# Patient Record
Sex: FEMALE | Race: WHITE | NOT HISPANIC OR LATINO | Employment: UNEMPLOYED | URBAN - METROPOLITAN AREA
[De-identification: names, ages, dates, MRNs, and addresses within clinical notes are randomized per-mention and may not be internally consistent; named-entity substitution may affect disease eponyms.]

---

## 2017-01-16 RX ORDER — LISINOPRIL 40 MG/1
TABLET ORAL
Qty: 90 TABLET | Refills: 1 | Status: SHIPPED | OUTPATIENT
Start: 2017-01-16 | End: 2017-07-17 | Stop reason: SDUPTHER

## 2017-01-24 RX ORDER — AMLODIPINE BESYLATE 5 MG/1
TABLET ORAL
Qty: 90 TABLET | Refills: 1 | Status: SHIPPED | OUTPATIENT
Start: 2017-01-24 | End: 2017-07-17 | Stop reason: SDUPTHER

## 2017-01-24 RX ORDER — HYDROCHLOROTHIAZIDE 25 MG/1
TABLET ORAL
Qty: 90 TABLET | Refills: 1 | Status: SHIPPED | OUTPATIENT
Start: 2017-01-24 | End: 2017-07-17 | Stop reason: SDUPTHER

## 2017-01-24 RX ORDER — ATENOLOL 100 MG/1
TABLET ORAL
Qty: 90 TABLET | Refills: 1 | Status: SHIPPED | OUTPATIENT
Start: 2017-01-24 | End: 2017-07-17 | Stop reason: SDUPTHER

## 2017-01-25 ENCOUNTER — OFFICE VISIT (OUTPATIENT)
Dept: INTERNAL MEDICINE | Facility: CLINIC | Age: 62
End: 2017-01-25

## 2017-01-25 VITALS
SYSTOLIC BLOOD PRESSURE: 132 MMHG | WEIGHT: 170 LBS | DIASTOLIC BLOOD PRESSURE: 72 MMHG | HEIGHT: 61 IN | TEMPERATURE: 98 F | HEART RATE: 72 BPM | BODY MASS INDEX: 32.1 KG/M2 | RESPIRATION RATE: 16 BRPM

## 2017-01-25 DIAGNOSIS — Z11.59 NEED FOR HEPATITIS C SCREENING TEST: Primary | ICD-10-CM

## 2017-01-25 DIAGNOSIS — Z23 NEED FOR PNEUMOCOCCAL VACCINE: ICD-10-CM

## 2017-01-25 DIAGNOSIS — Z00.00 WELLNESS EXAMINATION: ICD-10-CM

## 2017-01-25 DIAGNOSIS — I10 ESSENTIAL HYPERTENSION: ICD-10-CM

## 2017-01-25 DIAGNOSIS — Z13.820 SCREENING FOR OSTEOPOROSIS: ICD-10-CM

## 2017-01-25 PROCEDURE — G0009 ADMIN PNEUMOCOCCAL VACCINE: HCPCS | Performed by: NURSE PRACTITIONER

## 2017-01-25 PROCEDURE — G0438 PPPS, INITIAL VISIT: HCPCS | Performed by: NURSE PRACTITIONER

## 2017-01-25 PROCEDURE — 96160 PT-FOCUSED HLTH RISK ASSMT: CPT | Performed by: NURSE PRACTITIONER

## 2017-01-25 PROCEDURE — 90732 PPSV23 VACC 2 YRS+ SUBQ/IM: CPT | Performed by: NURSE PRACTITIONER

## 2017-01-25 RX ORDER — ATENOLOL 100 MG/1
TABLET ORAL
Qty: 90 TABLET | Refills: 1 | Status: CANCELLED | OUTPATIENT
Start: 2017-01-25

## 2017-01-25 RX ORDER — AMLODIPINE BESYLATE 5 MG/1
TABLET ORAL
Qty: 90 TABLET | Refills: 1 | Status: CANCELLED | OUTPATIENT
Start: 2017-01-25

## 2017-01-25 RX ORDER — HYDROCHLOROTHIAZIDE 25 MG/1
TABLET ORAL
Qty: 90 TABLET | Refills: 1 | Status: CANCELLED | OUTPATIENT
Start: 2017-01-25

## 2017-01-25 NOTE — PROGRESS NOTES
QUICK REFERENCE INFORMATION:  The ABCs of the Annual Wellness Visit    Initial Medicare Wellness Visit    HEALTH RISK ASSESSMENT    Recent Hospitalizations:  No recent hospitalization(s)..        Current Medical Providers:  Patient Care Team:  Janes Almazan MD as PCP - Internal Medicine  Elin Arredondo MD as Surgeon (General Surgery)  Karina Brown MD (Radiation Oncology)  Steven Akbar MD as Consulting Physician (Ophthalmology)  JEREMIE Carr as Nurse Practitioner (Nurse Practitioner)        Smoking Status:  History   Smoking Status   • Never Smoker   Smokeless Tobacco   • Never Used       Alcohol Consumption:  History   Alcohol Use No       Depression Screen:   PHQ-9 Depression Screening 1/25/2017   Little interest or pleasure in doing things 1   Feeling down, depressed, or hopeless 1   Trouble falling or staying asleep, or sleeping too much 1   Feeling tired or having little energy 2   Poor appetite or overeating 0   Feeling bad about yourself - or that you are a failure or have let yourself or your family down 0   Trouble concentrating on things, such as reading the newspaper or watching television 1   Moving or speaking so slowly that other people could have noticed. Or the opposite - being so fidgety or restless that you have been moving around a lot more than usual 0   Thoughts that you would be better off dead, or of hurting yourself in some way 0   PHQ-9 Total Score 6   If you checked off any problems, how difficult have these problems made it for you to do your work, take care of things at home, or get along with other people? Very difficult       Health Habits and Functional and Cognitive Screening:  Functional & Cognitive Status 1/25/2017   Do you have difficulty preparing food and eating? Yes   Do you have difficulty bathing yourself? No   Do you have difficulty getting dressed? No   Do you have difficulty using the toilet? No   Do you have difficulty moving around from place to place?  Yes   In the past year have you fallen or experienced a near fall? Yes   Do you need help using the phone?  No   Are you deaf or do you have serious difficulty hearing?  No   Do you need help with transportation? Yes   Do you need help shopping? Yes   Do you need help preparing meals?  Yes   Do you need help with housework?  Yes   Do you need help with laundry? No   Do you need help taking your medications? No   Do you need help managing money? No   Do you have difficulty concentrating, remembering or making decisions? Yes   Declines current need for counseling and does not need any additional med for depression.  Mostly situational due to becoming legally blind and having to stop driving and work.     Health Habits  Current Diet: Limited Junk Food  Exercise: 3 (times per week )  Current Exercise Activities Include: Walking    Does the patient have evidence of cognitive impairment? No    Asprin use counseling:yes    Finger Rub Hearing Test (right ear):passed  Finger Rub Hearing Test (left ear):passed    Recent Lab Results:    Visual Acuity:  No exam data present    Age-appropriate Screening Schedule:  Refer to the list below for future screening recommendations based on patient's age, sex and/or medical conditions. Orders for these recommended tests are listed in the plan section. The patient has been provided with a written plan.    Health Maintenance   Topic Date Due   • PNEUMOCOCCAL VACCINE (19-64 MEDIUM RISK) (1 of 1 - PPSV23) 07/17/1973   • TDAP/TD VACCINES (1 - Tdap) 07/17/1973   • ZOSTER VACCINE  06/09/2016   • URINE MICROALBUMIN  06/09/2017   • DIABETIC FOOT EXAM  10/01/2017   • DIABETIC EYE EXAM  10/01/2017   • LIPID PANEL  12/05/2017   • HEMOGLOBIN A1C  12/05/2017   • MAMMOGRAM  03/01/2018   • PAP SMEAR  04/01/2019   • COLONOSCOPY  10/07/2023   • INFLUENZA VACCINE  Completed        Subjective   History of Present Illness    Zeinab Rodriguez is a 61 y.o. female who presents for an Annual Wellness Visit. In  addition, we addressed the following health issues:falls risk due to eyesight    The following portions of the patient's history were reviewed and updated as appropriate: allergies, current medications, past family history, past medical history, past social history, past surgical history and problem list.    Outpatient Medications Prior to Visit   Medication Sig Dispense Refill   • amLODIPine (NORVASC) 5 MG tablet TAKE (1) TABLET DAILY FOR HEART. 90 tablet 1   • aspirin 81 MG tablet Take  by mouth daily.     • atenolol (TENORMIN) 100 MG tablet TAKE (1) TABLET DAILY FOR HEART. 90 tablet 1   • CILOXAN 0.3 % ophthalmic ointment      • dorzolamide-timolol (COSOPT) 22.3-6.8 MG/ML ophthalmic solution Apply  to eye 2 (two) times a day.     • glipiZIDE (GLUCOTROL) 10 MG tablet Take 1 tablet by mouth 2 (Two) Times a Day. 180 tablet 0   • glucose blood (ONE TOUCH ULTRA TEST) test strip TEST ONCE DAILY AND AS NEEDED 100 each 3   • hydrochlorothiazide (HYDRODIURIL) 25 MG tablet TAKE (1) TABLET DAILY FOR FLUID. 90 tablet 1   • JANUVIA 100 MG tablet TAKE 1 TABLET ONCE DAILY FOR DIABETES 90 tablet 1   • Lancets misc USE TO TEST ONCE DAILY AND AS NEEDED 100 each 3   • latanoprost (XALATAN) 0.005 % ophthalmic solution      • lisinopril (PRINIVIL,ZESTRIL) 40 MG tablet TAKE 1 TABLET DAILY 90 tablet 1   • metFORMIN (GLUCOPHAGE) 1000 MG tablet TAKE 1 TABLET TWICE A  tablet 1   • Multiple Vitamins-Minerals (ICAPS AREDS FORMULA PO) Take  by mouth 2 (two) times a day.     • PARoxetine (PAXIL) 20 MG tablet Take 1 tablet by mouth Every Morning. 90 tablet 0   • melatonin 1 MG tablet Take 3 mg by mouth every night.       No facility-administered medications prior to visit.        Patient Active Problem List   Diagnosis   • Abnormal mammogram   • Anemia   • Depression   • Diabetes mellitus   • Hyperlipidemia   • Hypertension   • Urinary tract infection   • Anxiety       Advanced Care Planning:  has NO advanced directive - information  "provided to the patient today    Identification of Risk Factors:  Risk factors include: increased fall risk.    Review of Systems    Compared to one year ago, the patient feels her physical health is better.  Compared to one year ago, the patient feels her mental health is better.    Objective     Physical Exam    Vitals:    01/25/17 1053   BP: 132/72   BP Location: Right arm   Patient Position: Sitting   Cuff Size: Adult   Pulse: 72   Resp: 16   Temp: 98 °F (36.7 °C)   TempSrc: Temporal Artery    Weight: 170 lb (77.1 kg)   Height: 61\" (154.9 cm)       Body mass index is 32.12 kg/(m^2).  Discussed the patient's BMI with her. The BMI is above average; BMI management plan is completed.    Assessment/Plan   Patient Self-Management and Personalized Health Advice  The patient has been provided with information about: exercise and weight management and preventive services including:   · Bone densitometry screening, Fall Risk assessment done, hep c .    Visit Diagnoses:    ICD-10-CM ICD-9-CM   1. Need for hepatitis C screening test Z11.59 V73.89   2. Screening for osteoporosis Z13.820 V82.81   3. Need for pneumococcal vaccine Z23 V03.82   4. Wellness examination Z00.00 V70.0       Orders Placed This Encounter   Procedures   • Dexa Bone Density, Axial (Every 2 Years)     Standing Status:   Future     Standing Expiration Date:   1/25/2018     Order Specific Question:   Reason for Exam:     Answer:   post menopausal   • Pneumococcal Polysaccharide Vaccine 23-Valent Greater Than or Equal To 3yo Subcutaneous / IM   • Hepatitis C Antibody       Outpatient Encounter Prescriptions as of 1/25/2017   Medication Sig Dispense Refill   • amLODIPine (NORVASC) 5 MG tablet TAKE (1) TABLET DAILY FOR HEART. 90 tablet 1   • aspirin 81 MG tablet Take  by mouth daily.     • atenolol (TENORMIN) 100 MG tablet TAKE (1) TABLET DAILY FOR HEART. 90 tablet 1   • CILOXAN 0.3 % ophthalmic ointment      • dorzolamide-timolol (COSOPT) 22.3-6.8 MG/ML " ophthalmic solution Apply  to eye 2 (two) times a day.     • glipiZIDE (GLUCOTROL) 10 MG tablet Take 1 tablet by mouth 2 (Two) Times a Day. 180 tablet 0   • glucose blood (ONE TOUCH ULTRA TEST) test strip TEST ONCE DAILY AND AS NEEDED 100 each 3   • hydrochlorothiazide (HYDRODIURIL) 25 MG tablet TAKE (1) TABLET DAILY FOR FLUID. 90 tablet 1   • JANUVIA 100 MG tablet TAKE 1 TABLET ONCE DAILY FOR DIABETES 90 tablet 1   • Lancets misc USE TO TEST ONCE DAILY AND AS NEEDED 100 each 3   • latanoprost (XALATAN) 0.005 % ophthalmic solution      • lisinopril (PRINIVIL,ZESTRIL) 40 MG tablet TAKE 1 TABLET DAILY 90 tablet 1   • metFORMIN (GLUCOPHAGE) 1000 MG tablet TAKE 1 TABLET TWICE A  tablet 1   • Multiple Vitamins-Minerals (ICAPS AREDS FORMULA PO) Take  by mouth 2 (two) times a day.     • PARoxetine (PAXIL) 20 MG tablet Take 1 tablet by mouth Every Morning. 90 tablet 0   • melatonin 1 MG tablet Take 3 mg by mouth every night.       No facility-administered encounter medications on file as of 1/25/2017.        Reviewed use of high risk medication in the elderly: yes  Reviewed for potential of harmful drug interactions in the elderly: yes    Follow Up:  One year recall for wellness and PE fasting at next f/u with pcp Dr Lisette Gross After Visit Summary and PPPS with all of these plans were given to the patient.

## 2017-01-25 NOTE — PATIENT INSTRUCTIONS
Fall Prevention in the Home   Falls can cause injuries and can affect people from all age groups. There are many simple things that you can do to make your home safe and to help prevent falls.  WHAT CAN I DO ON THE OUTSIDE OF MY HOME?  · Regularly repair the edges of walkways and driveways and fix any cracks.  · Remove high doorway thresholds.  · Trim any shrubbery on the main path into your home.  · Use bright outdoor lighting.  · Clear walkways of debris and clutter, including tools and rocks.  · Regularly check that handrails are securely fastened and in good repair. Both sides of any steps should have handrails.  · Install guardrails along the edges of any raised decks or porches.  · Have leaves, snow, and ice cleared regularly.  · Use sand or salt on walkways during winter months.  · In the garage, clean up any spills right away, including grease or oil spills.  WHAT CAN I DO IN THE BATHROOM?  · Use night lights.  · Install grab bars by the toilet and in the tub and shower. Do not use towel bars as grab bars.  · Use non-skid mats or decals on the floor of the tub or shower.  · If you need to sit down while you are in the shower, use a plastic, non-slip stool..  · Keep the floor dry. Immediately clean up any water that spills on the floor.  · Remove soap buildup in the tub or shower on a regular basis.  · Attach bath mats securely with double-sided non-slip rug tape.  · Remove throw rugs and other tripping hazards from the floor.  WHAT CAN I DO IN THE BEDROOM?  · Use night lights.  · Make sure that a bedside light is easy to reach.  · Do not use oversized bedding that drapes onto the floor.  · Have a firm chair that has side arms to use for getting dressed.  · Remove throw rugs and other tripping hazards from the floor.  WHAT CAN I DO IN THE KITCHEN?   · Clean up any spills right away.  · Avoid walking on wet floors.  · Place frequently used items in easy-to-reach places.  · If you need to reach for something  above you, use a sturdy step stool that has a grab bar.  · Keep electrical cables out of the way.  · Do not use floor polish or wax that makes floors slippery. If you have to use wax, make sure that it is non-skid floor wax.  · Remove throw rugs and other tripping hazards from the floor.  WHAT CAN I DO IN THE STAIRWAYS?  · Do not leave any items on the stairs.  · Make sure that there are handrails on both sides of the stairs. Fix handrails that are broken or loose. Make sure that handrails are as long as the stairways.  · Check any carpeting to make sure that it is firmly attached to the stairs. Fix any carpet that is loose or worn.  · Avoid having throw rugs at the top or bottom of stairways, or secure the rugs with carpet tape to prevent them from moving.  · Make sure that you have a light switch at the top of the stairs and the bottom of the stairs. If you do not have them, have them installed.  WHAT ARE SOME OTHER FALL PREVENTION TIPS?  · Wear closed-toe shoes that fit well and support your feet. Wear shoes that have rubber soles or low heels.  · When you use a stepladder, make sure that it is completely opened and that the sides are firmly locked. Have someone hold the ladder while you are using it. Do not climb a closed stepladder.  · Add color or contrast paint or tape to grab bars and handrails in your home. Place contrasting color strips on the first and last steps.  · Use mobility aids as needed, such as canes, walkers, scooters, and crutches.  · Turn on lights if it is dark. Replace any light bulbs that burn out.  · Set up furniture so that there are clear paths. Keep the furniture in the same spot.  · Fix any uneven floor surfaces.  · Choose a carpet design that does not hide the edge of steps of a stairway.  · Be aware of any and all pets.  · Review your medicines with your healthcare provider. Some medicines can cause dizziness or changes in blood pressure, which increase your risk of falling.  Talk  "with your health care provider about other ways that you can decrease your risk of falls. This may include working with a physical therapist or  to improve your strength, balance, and endurance.     This information is not intended to replace advice given to you by your health care provider. Make sure you discuss any questions you have with your health care provider.     Document Released: 12/08/2003 DocumentBasic Carbohydrate Counting for Diabetes Mellitus  Carbohydrate counting is a method for keeping track of the amount of carbohydrates you eat. Eating carbohydrates naturally increases the level of sugar (glucose) in your blood, so it is important for you to know the amount that is okay for you to have in every meal. Carbohydrate counting helps keep the level of glucose in your blood within normal limits. The amount of carbohydrates allowed is different for every person. A dietitian can help you calculate the amount that is right for you. Once you know the amount of carbohydrates you can have, you can count the carbohydrates in the foods you want to eat.  Carbohydrates are found in the following foods:  · Grains, such as breads and cereals.  · Dried beans and soy products.  · Starchy vegetables, such as potatoes, peas, and corn.  · Fruit and fruit juices.  · Milk and yogurt.  · Sweets and snack foods, such as cake, cookies, candy, chips, soft drinks, and fruit drinks.  CARBOHYDRATE COUNTING  There are two ways to count the carbohydrates in your food. You can use either of the methods or a combination of both.  Reading the \"Nutrition Facts\" on Packaged Food  The \"Nutrition Facts\" is an area that is included on the labels of almost all packaged food and beverages in the [place]. It includes the serving size of that food or beverage and information about the nutrients in each serving of the food, including the grams (g) of carbohydrate per serving.   Decide the number of servings of this food or " "beverage that you will be able to eat or drink. Multiply that number of servings by the number of grams of carbohydrate that is listed on the label for that serving. The total will be the amount of carbohydrates you will be having when you eat or drink this food or beverage.  Learning Standard Serving Sizes of Food  When you eat food that is not packaged or does not include \"Nutrition Facts\" on the label, you need to measure the servings in order to count the amount of carbohydrates. A serving of most carbohydrate-rich foods contains about 15 g of carbohydrates. The following list includes serving sizes of carbohydrate-rich foods that provide 15 g of carbohydrate per serving:    · 1 slice of bread (1 oz) or 1 six-inch tortilla.    · ½ of a hamburger bun or English muffin.  · 4-6 crackers.  · ¾ cup unsweetened dry cereal.    · ½ cup hot cereal.  · ? cup rice or pasta.    · ½ cup mashed potatoes or ¼ of a large baked potato.  · 1 cup fresh fruit or one small piece of fruit.    · ½ cup canned or frozen fruit or fruit juice.  · 1 cup milk.  · ? cup plain fat-free yogurt or yogurt sweetened with artificial sweeteners.  · ½ cup cooked dried beans or starchy vegetable, such as peas, corn, or potatoes.    Decide the number of standard-size servings that you will eat. Multiply that number of servings by 15 (the grams of carbohydrates in that serving). For example, if you eat 2 cups of strawberries, you will have eaten 2 servings and 30 g of carbohydrates (2 servings x 15 g = 30 g). For foods such as soups and casseroles, in which more than one food is mixed in, you will need to count the carbohydrates in each food that is included.  EXAMPLE OF CARBOHYDRATE COUNTING  Sample Dinner   · 3 oz chicken breast.  · ? cup of brown rice.  · ½ cup of corn.  · 1 cup milk.    · 1 cup strawberries with sugar-free whipped topping.    Carbohydrate Calculation   Step 1: Identify the foods that contain carbohydrates:   · Rice.    · Corn. "    · Milk.    · Strawberries.  Step 2: Calculate the number of servings eaten of each:   · 2 servings of rice.    · 1 serving of corn.    · 1 serving of milk.    · 1 serving of strawberries.  Step 3:  Multiply each of those number of servings by 15 g:   · 2 servings of rice x 15 g = 30 g.    · 1 serving of corn x 15 g = 15 g.    · 1 serving of milk x 15 g = 15 g.    · 1 serving of strawberries x 15 g = 15 g.  Step 4: Add together all of the amounts to find the total grams of carbohydrates eaten: 30 g + 15 g + 15 g + 15 g = 75 g.     This information is not intended to replace advice given to you by your health care provider. Make sure you discuss any questions you have with your health care provider.     Document Released: 12/18/2006 Document Revised: 01/08/2016 Document Reviewed: 11/14/2014  [employer] Interactive Patient Education ©2016 [employer]  Fat and Cholesterol Restricted Diet  High levels of fat and cholesterol in your blood may lead to various health problems, such as diseases of the heart, blood vessels, gallbladder, liver, and pancreas. Fats are concentrated sources of energy that come in various forms. Certain types of fat, including saturated fat, may be harmful in excess. Cholesterol is a substance needed by your body in small amounts. Your body makes all the cholesterol it needs. Excess cholesterol comes from the food you eat.  When you have high levels of cholesterol and saturated fat in your blood, health problems can develop because the excess fat and cholesterol will gather along the walls of your blood vessels, causing them to narrow. Choosing the right foods will help you control your intake of fat and cholesterol. This will help keep the levels of these substances in your blood within normal limits and reduce your risk of disease.  WHAT IS MY PLAN?  Your health care provider recommends that you:  · Get no more than __________ % of the total calories in your daily diet from fat.  · Limit your  "intake of saturated fat to less than ______% of your total calories each day.  · Limit the amount of cholesterol in your diet to less than _________mg per day.  WHAT TYPES OF FAT SHOULD I CHOOSE?  · Choose healthy fats more often. Choose monounsaturated and polyunsaturated fats, such as olive and canola oil, flaxseeds, walnuts, almonds, and seeds.  · Eat more omega-3 fats. Good choices include salmon, mackerel, sardines, tuna, flaxseed oil, and ground flaxseeds. Aim to eat fish at least two times a week.  · Limit saturated fats. Saturated fats are primarily found in animal products, such as meats, butter, and cream. Plant sources of saturated fats include palm oil, palm kernel oil, and coconut oil.  · Avoid foods with partially hydrogenated oils in them. These contain trans fats. Examples of foods that contain trans fats are stick margarine, some tub margarines, cookies, crackers, and other baked goods.  WHAT GENERAL GUIDELINES DO I NEED TO FOLLOW?  These guidelines for healthy eating will help you control your intake of fat and cholesterol:  · Check food labels carefully to identify foods with trans fats or high amounts of saturated fat.  · Fill one half of your plate with vegetables and green salads.  · Fill one fourth of your plate with whole grains. Look for the word \"whole\" as the first word in the ingredient list.  · Fill one fourth of your plate with lean protein foods.  · Limit fruit to two servings a day. Choose fruit instead of juice.  · Eat more foods that contain soluble fiber. Examples of foods that contain this type of fiber are apples, broccoli, carrots, beans, peas, and barley. Aim to get 20-30 g of fiber per day.  · Eat more home-cooked food and less restaurant, buffet, and fast food.  · Limit or avoid alcohol.  · Limit foods high in starch and sugar.  · Limit fried foods.  · Cook foods using methods other than frying. Baking, boiling, grilling, and broiling are all great options.  · Lose weight if " you are overweight. Losing just 5-10% of your initial body weight can help your overall health and prevent diseases such as diabetes and heart disease.  WHAT FOODS CAN I EAT?  Grains  Whole grains, such as whole wheat or whole grain breads, crackers, cereals, and pasta. Unsweetened oatmeal, bulgur, barley, quinoa, or brown rice. Corn or whole wheat flour tortillas.  Vegetables  Fresh or frozen vegetables (raw, steamed, roasted, or grilled). Green salads.  Fruits  All fresh, canned (in natural juice), or frozen fruits.  Meat and Other Protein Products  Ground beef (85% or leaner), grass-fed beef, or beef trimmed of fat. Skinless chicken or turkey. Ground chicken or turkey. Pork trimmed of fat. All fish and seafood. Eggs. Dried beans, peas, or lentils. Unsalted nuts or seeds. Unsalted canned or dry beans.  Dairy  Low-fat dairy products, such as skim or 1% milk, 2% or reduced-fat cheeses, low-fat ricotta or cottage cheese, or plain low-fat yogurt.  Fats and Oils  Tub margarines without trans fats. Light or reduced-fat mayonnaise and salad dressings. Avocado. Olive, canola, sesame, or safflower oils. Natural peanut or almond butter (choose ones without added sugar and oil).  The items listed above may not be a complete list of recommended foods or beverages. Contact your dietitian for more options.  WHAT FOODS ARE NOT RECOMMENDED?  Grains  White bread. White pasta. White rice. Cornbread. Bagels, pastries, and croissants. Crackers that contain trans fat.  Vegetables  White potatoes. Corn. Creamed or fried vegetables. Vegetables in a cheese sauce.  Fruits  Dried fruits. Canned fruit in light or heavy syrup. Fruit juice.  Meat and Other Protein Products  Fatty cuts of meat. Ribs, chicken wings, weaver, sausage, bologna, salami, chitterlings, fatback, hot dogs, bratwurst, and packaged luncheon meats. Liver and organ meats.  Dairy  Whole or 2% milk, cream, half-and-half, and cream cheese. Whole milk cheeses. Whole-fat or  sweetened yogurt. Full-fat cheeses. Nondairy creamers and whipped toppings. Processed cheese, cheese spreads, or cheese curds.  Sweets and Desserts  Corn syrup, sugars, honey, and molasses. Candy. Jam and jelly. Syrup. Sweetened cereals. Cookies, pies, cakes, donuts, muffins, and ice cream.  Fats and Oils  Butter, stick margarine, lard, shortening, ghee, or weaver fat. Coconut, palm kernel, or palm oils.  Beverages  Alcohol. Sweetened drinks (such as sodas, lemonade, and fruit drinks or punches).  The items listed above may not be a complete list of foods and beverages to avoid. Contact your dietitian for more information.     This information is not intended to replace advice given to you by your health care provider. Make sure you discuss any questions you have with your health care provider.     Document Released: 12/18/2006 Document Revised: 01/08/2016 Document Reviewed: 03/18/2015  [employer] Interactive Patient Education ©2016 [employer]   Revised: 05/03/2016 Document Reviewed: 01/22/2016  [employer] Interactive Patient Education ©2016 [employer]

## 2017-01-25 NOTE — LETTER
"VACCINE CONSENT FORM      Patient Name:  Zeinab Rodriguez    Patient :  1955      I/We have read, or have been explained, the information about the diseases and the vaccines listed below.  There was an opportunity to ask questions and any questions were answered satisfactorily.  I/We believe that I/we understand the benefits and risks of the vaccines(s) cited, and ask the vaccine(s) listed below be given to me/us or the person named above (for which i have authorized to make the request).      Vaccine(s) give:    Orders Placed This Encounter   Procedures   • Pneumococcal Polysaccharide Vaccine 23-Valent Greater Than or Equal To 1yo Subcutaneous / IM         Medicare patients:    The only vaccine covered under your medical benefit is flu/pneumonia and hepatitis B.  All other may be covered under your \"Part D\" prescription plan and requires you to go to a pharmacy with a Physician orders for administration.  If you still prefer to have it administered at our office, you will receive a bill for the vaccine and administration cost.               Patient Initials                     Patient or Parent/Guardian Signature                    Date        A copy of the appropriate Centers for Disease Control and Prevention Vaccine Information Statements has been provided.   "

## 2017-01-25 NOTE — MR AVS SNAPSHOT
Zeinab Wages   1/25/2017 11:00 AM   Office Visit    Provider:  JEREMIE Denson   Department:  [facility]   Dept Phone:  [phone]                Your Full Care Plan              Your Updated Medication List          This list is accurate as of: 1/25/17 12:26 PM.  Always use your most recent med list.                amLODIPine 5 MG tablet   Commonly known as:  NORVASC   TAKE (1) TABLET DAILY FOR HEART.       aspirin 81 MG tablet       atenolol 100 MG tablet   Commonly known as:  TENORMIN   TAKE (1) TABLET DAILY FOR HEART.       CILOXAN 0.3 % ophthalmic ointment   Generic drug:  ciprofloxacin       COSOPT 22.3-6.8 MG/ML ophthalmic solution   Generic drug:  dorzolamide-timolol       glipiZIDE 10 MG tablet   Commonly known as:  GLUCOTROL   Take 1 tablet by mouth 2 (Two) Times a Day.       glucose blood test strip   Commonly known as:  ONE TOUCH ULTRA TEST   TEST ONCE DAILY AND AS NEEDED       hydrochlorothiazide 25 MG tablet   Commonly known as:  HYDRODIURIL   TAKE (1) TABLET DAILY FOR FLUID.       ICAPS AREDS FORMULA PO       JANUVIA 100 MG tablet   Generic drug:  SITagliptin   TAKE 1 TABLET ONCE DAILY FOR DIABETES       Lancets misc   USE TO TEST ONCE DAILY AND AS NEEDED       latanoprost 0.005 % ophthalmic solution   Commonly known as:  XALATAN       lisinopril 40 MG tablet   Commonly known as:  PRINIVIL,ZESTRIL   TAKE 1 TABLET DAILY       melatonin 1 MG tablet       metFORMIN 1000 MG tablet   Commonly known as:  GLUCOPHAGE   TAKE 1 TABLET TWICE A DAY       PARoxetine 20 MG tablet   Commonly known as:  PAXIL   Take 1 tablet by mouth Every Morning.               We Performed the Following     Hepatitis C Antibody     Pneumococcal Polysaccharide Vaccine 23-Valent Greater Than or Equal To 3yo Subcutaneous / IM       You Were Diagnosed With        Codes Comments    Need for hepatitis C screening test    -  Primary ICD-10-CM:  Z11.59  ICD-9-CM: V73.89     Screening for osteoporosis     ICD-10-CM: Z13.820  ICD-9-CM: V82.81     Need for pneumococcal vaccine     ICD-10-CM: Z23  ICD-9-CM: V03.82     Wellness examination     ICD-10-CM: Z00.00  ICD-9-CM: V70.0     Essential hypertension     ICD-10-CM: I10  ICD-9-CM: 401.9       Instructions    Fall Prevention in the Home   Falls can cause injuries and can affect people from all age groups. There are many simple things that you can do to make your home safe and to help prevent falls.  WHAT CAN I DO ON THE OUTSIDE OF MY HOME?  · Regularly repair the edges of walkways and driveways and fix any cracks.  · Remove high doorway thresholds.  · Trim any shrubbery on the main path into your home.  · Use bright outdoor lighting.  · Clear walkways of debris and clutter, including tools and rocks.  · Regularly check that handrails are securely fastened and in good repair. Both sides of any steps should have handrails.  · Install guardrails along the edges of any raised decks or porches.  · Have leaves, snow, and ice cleared regularly.  · Use sand or salt on walkways during winter months.  · In the garage, clean up any spills right away, including grease or oil spills.  WHAT CAN I DO IN THE BATHROOM?  · Use night lights.  · Install grab bars by the toilet and in the tub and shower. Do not use towel bars as grab bars.  · Use non-skid mats or decals on the floor of the tub or shower.  · If you need to sit down while you are in the shower, use a plastic, non-slip stool..  · Keep the floor dry. Immediately clean up any water that spills on the floor.  · Remove soap buildup in the tub or shower on a regular basis.  · Attach bath mats securely with double-sided non-slip rug tape.  · Remove throw rugs and other tripping hazards from the floor.  WHAT CAN I DO IN THE BEDROOM?  · Use night lights.  · Make sure that a bedside light is easy to reach.  · Do not use oversized bedding that drapes onto the floor.  · Have a  firm chair that has side arms to use for getting dressed.  · Remove throw rugs and other tripping hazards from the floor.  WHAT CAN I DO IN THE KITCHEN?   · Clean up any spills right away.  · Avoid walking on wet floors.  · Place frequently used items in easy-to-reach places.  · If you need to reach for something above you, use a sturdy step stool that has a grab bar.  · Keep electrical cables out of the way.  · Do not use floor polish or wax that makes floors slippery. If you have to use wax, make sure that it is non-skid floor wax.  · Remove throw rugs and other tripping hazards from the floor.  WHAT CAN I DO IN THE STAIRWAYS?  · Do not leave any items on the stairs.  · Make sure that there are handrails on both sides of the stairs. Fix handrails that are broken or loose. Make sure that handrails are as long as the stairways.  · Check any carpeting to make sure that it is firmly attached to the stairs. Fix any carpet that is loose or worn.  · Avoid having throw rugs at the top or bottom of stairways, or secure the rugs with carpet tape to prevent them from moving.  · Make sure that you have a light switch at the top of the stairs and the bottom of the stairs. If you do not have them, have them installed.  WHAT ARE SOME OTHER FALL PREVENTION TIPS?  · Wear closed-toe shoes that fit well and support your feet. Wear shoes that have rubber soles or low heels.  · When you use a stepladder, make sure that it is completely opened and that the sides are firmly locked. Have someone hold the ladder while you are using it. Do not climb a closed stepladder.  · Add color or contrast paint or tape to grab bars and handrails in your home. Place contrasting color strips on the first and last steps.  · Use mobility aids as needed, such as canes, walkers, scooters, and crutches.  · Turn on lights if it is dark. Replace any light bulbs that burn out.  · Set up furniture so that there are clear paths. Keep the furniture in the same  "spot.  · Fix any uneven floor surfaces.  · Choose a carpet design that does not hide the edge of steps of a stairway.  · Be aware of any and all pets.  · Review your medicines with your healthcare provider. Some medicines can cause dizziness or changes in blood pressure, which increase your risk of falling.  Talk with your health care provider about other ways that you can decrease your risk of falls. This may include working with a physical therapist or  to improve your strength, balance, and endurance.     This information is not intended to replace advice given to you by your health care provider. Make sure you discuss any questions you have with your health care provider.     Document Released: 12/08/2003 DocumentBasic Carbohydrate Counting for Diabetes Mellitus  Carbohydrate counting is a method for keeping track of the amount of carbohydrates you eat. Eating carbohydrates naturally increases the level of sugar (glucose) in your blood, so it is important for you to know the amount that is okay for you to have in every meal. Carbohydrate counting helps keep the level of glucose in your blood within normal limits. The amount of carbohydrates allowed is different for every person. A dietitian can help you calculate the amount that is right for you. Once you know the amount of carbohydrates you can have, you can count the carbohydrates in the foods you want to eat.  Carbohydrates are found in the following foods:  · Grains, such as breads and cereals.  · Dried beans and soy products.  · Starchy vegetables, such as potatoes, peas, and corn.  · Fruit and fruit juices.  · Milk and yogurt.  · Sweets and snack foods, such as cake, cookies, candy, chips, soft drinks, and fruit drinks.  CARBOHYDRATE COUNTING  There are two ways to count the carbohydrates in your food. You can use either of the methods or a combination of both.  Reading the \"Nutrition Facts\" on Packaged Food  The \"Nutrition Facts\" is an area that is " "included on the labels of almost all packaged food and beverages in the [place]. It includes the serving size of that food or beverage and information about the nutrients in each serving of the food, including the grams (g) of carbohydrate per serving.   Decide the number of servings of this food or beverage that you will be able to eat or drink. Multiply that number of servings by the number of grams of carbohydrate that is listed on the label for that serving. The total will be the amount of carbohydrates you will be having when you eat or drink this food or beverage.  Learning Standard Serving Sizes of Food  When you eat food that is not packaged or does not include \"Nutrition Facts\" on the label, you need to measure the servings in order to count the amount of carbohydrates. A serving of most carbohydrate-rich foods contains about 15 g of carbohydrates. The following list includes serving sizes of carbohydrate-rich foods that provide 15 g of carbohydrate per serving:    · 1 slice of bread (1 oz) or 1 six-inch tortilla.    · ½ of a hamburger bun or English muffin.  · 4-6 crackers.  · ¾ cup unsweetened dry cereal.    · ½ cup hot cereal.  · ? cup rice or pasta.    · ½ cup mashed potatoes or ¼ of a large baked potato.  · 1 cup fresh fruit or one small piece of fruit.    · ½ cup canned or frozen fruit or fruit juice.  · 1 cup milk.  · ? cup plain fat-free yogurt or yogurt sweetened with artificial sweeteners.  · ½ cup cooked dried beans or starchy vegetable, such as peas, corn, or potatoes.    Decide the number of standard-size servings that you will eat. Multiply that number of servings by 15 (the grams of carbohydrates in that serving). For example, if you eat 2 cups of strawberries, you will have eaten 2 servings and 30 g of carbohydrates (2 servings x 15 g = 30 g). For foods such as soups and casseroles, in which more than one food is mixed in, you will need to count the carbohydrates in each food that is " included.  EXAMPLE OF CARBOHYDRATE COUNTING  Sample Dinner   · 3 oz chicken breast.  · ? cup of brown rice.  · ½ cup of corn.  · 1 cup milk.    · 1 cup strawberries with sugar-free whipped topping.    Carbohydrate Calculation   Step 1: Identify the foods that contain carbohydrates:   · Rice.    · Corn.    · Milk.    · Strawberries.  Step 2: Calculate the number of servings eaten of each:   · 2 servings of rice.    · 1 serving of corn.    · 1 serving of milk.    · 1 serving of strawberries.  Step 3:  Multiply each of those number of servings by 15 g:   · 2 servings of rice x 15 g = 30 g.    · 1 serving of corn x 15 g = 15 g.    · 1 serving of milk x 15 g = 15 g.    · 1 serving of strawberries x 15 g = 15 g.  Step 4: Add together all of the amounts to find the total grams of carbohydrates eaten: 30 g + 15 g + 15 g + 15 g = 75 g.     This information is not intended to replace advice given to you by your health care provider. Make sure you discuss any questions you have with your health care provider.     Document Released: 12/18/2006 Document Revised: 01/08/2016 Document Reviewed: 11/14/2014  [employer] Interactive Patient Education ©2016 [employer]  Fat and Cholesterol Restricted Diet  High levels of fat and cholesterol in your blood may lead to various health problems, such as diseases of the heart, blood vessels, gallbladder, liver, and pancreas. Fats are concentrated sources of energy that come in various forms. Certain types of fat, including saturated fat, may be harmful in excess. Cholesterol is a substance needed by your body in small amounts. Your body makes all the cholesterol it needs. Excess cholesterol comes from the food you eat.  When you have high levels of cholesterol and saturated fat in your blood, health problems can develop because the excess fat and cholesterol will gather along the walls of your blood vessels, causing them to narrow. Choosing the right foods will help you control your intake of  "fat and cholesterol. This will help keep the levels of these substances in your blood within normal limits and reduce your risk of disease.  WHAT IS MY PLAN?  Your health care provider recommends that you:  · Get no more than __________ % of the total calories in your daily diet from fat.  · Limit your intake of saturated fat to less than ______% of your total calories each day.  · Limit the amount of cholesterol in your diet to less than _________mg per day.  WHAT TYPES OF FAT SHOULD I CHOOSE?  · Choose healthy fats more often. Choose monounsaturated and polyunsaturated fats, such as olive and canola oil, flaxseeds, walnuts, almonds, and seeds.  · Eat more omega-3 fats. Good choices include salmon, mackerel, sardines, tuna, flaxseed oil, and ground flaxseeds. Aim to eat fish at least two times a week.  · Limit saturated fats. Saturated fats are primarily found in animal products, such as meats, butter, and cream. Plant sources of saturated fats include palm oil, palm kernel oil, and coconut oil.  · Avoid foods with partially hydrogenated oils in them. These contain trans fats. Examples of foods that contain trans fats are stick margarine, some tub margarines, cookies, crackers, and other baked goods.  WHAT GENERAL GUIDELINES DO I NEED TO FOLLOW?  These guidelines for healthy eating will help you control your intake of fat and cholesterol:  · Check food labels carefully to identify foods with trans fats or high amounts of saturated fat.  · Fill one half of your plate with vegetables and green salads.  · Fill one fourth of your plate with whole grains. Look for the word \"whole\" as the first word in the ingredient list.  · Fill one fourth of your plate with lean protein foods.  · Limit fruit to two servings a day. Choose fruit instead of juice.  · Eat more foods that contain soluble fiber. Examples of foods that contain this type of fiber are apples, broccoli, carrots, beans, peas, and barley. Aim to get 20-30 g of " fiber per day.  · Eat more home-cooked food and less restaurant, buffet, and fast food.  · Limit or avoid alcohol.  · Limit foods high in starch and sugar.  · Limit fried foods.  · Cook foods using methods other than frying. Baking, boiling, grilling, and broiling are all great options.  · Lose weight if you are overweight. Losing just 5-10% of your initial body weight can help your overall health and prevent diseases such as diabetes and heart disease.  WHAT FOODS CAN I EAT?  Grains  Whole grains, such as whole wheat or whole grain breads, crackers, cereals, and pasta. Unsweetened oatmeal, bulgur, barley, quinoa, or brown rice. Corn or whole wheat flour tortillas.  Vegetables  Fresh or frozen vegetables (raw, steamed, roasted, or grilled). Green salads.  Fruits  All fresh, canned (in natural juice), or frozen fruits.  Meat and Other Protein Products  Ground beef (85% or leaner), grass-fed beef, or beef trimmed of fat. Skinless chicken or turkey. Ground chicken or turkey. Pork trimmed of fat. All fish and seafood. Eggs. Dried beans, peas, or lentils. Unsalted nuts or seeds. Unsalted canned or dry beans.  Dairy  Low-fat dairy products, such as skim or 1% milk, 2% or reduced-fat cheeses, low-fat ricotta or cottage cheese, or plain low-fat yogurt.  Fats and Oils  Tub margarines without trans fats. Light or reduced-fat mayonnaise and salad dressings. Avocado. Olive, canola, sesame, or safflower oils. Natural peanut or almond butter (choose ones without added sugar and oil).  The items listed above may not be a complete list of recommended foods or beverages. Contact your dietitian for more options.  WHAT FOODS ARE NOT RECOMMENDED?  Grains  White bread. White pasta. White rice. Cornbread. Bagels, pastries, and croissants. Crackers that contain trans fat.  Vegetables  White potatoes. Corn. Creamed or fried vegetables. Vegetables in a cheese sauce.  Fruits  Dried fruits. Canned fruit in light or heavy syrup. Fruit  juice.  Meat and Other Protein Products  Fatty cuts of meat. Ribs, chicken wings, weaver, sausage, bologna, salami, chitterlings, fatback, hot dogs, bratwurst, and packaged luncheon meats. Liver and organ meats.  Dairy  Whole or 2% milk, cream, half-and-half, and cream cheese. Whole milk cheeses. Whole-fat or sweetened yogurt. Full-fat cheeses. Nondairy creamers and whipped toppings. Processed cheese, cheese spreads, or cheese curds.  Sweets and Desserts  Corn syrup, sugars, honey, and molasses. Candy. Jam and jelly. Syrup. Sweetened cereals. Cookies, pies, cakes, donuts, muffins, and ice cream.  Fats and Oils  Butter, stick margarine, lard, shortening, ghee, or weaver fat. Coconut, palm kernel, or palm oils.  Beverages  Alcohol. Sweetened drinks (such as sodas, lemonade, and fruit drinks or punches).  The items listed above may not be a complete list of foods and beverages to avoid. Contact your dietitian for more information.     This information is not intended to replace advice given to you by your health care provider. Make sure you discuss any questions you have with your health care provider.     Document Released: 2006 Document Revised: 2016 Document Reviewed: 2015  [employer] Interactive Patient Education © [employer]   Revised: 2016 Document Reviewed: 2016  [employer] Interactive Patient Education ©6 [employer]       Patient Instructions History      [employer] Signup     [employer] allows you to send messages to your doctor, view your test results, renew your prescriptions, schedule appointments, and more. To sign up, go to [employer] and click on the Sign Up Now link in the New User? box. Enter your [employer] Activation Code exactly as it appears below along with the last four digits of your Social Security Number and your Date of Birth () to complete the sign-up process. If you do not sign up before the expiration date, you must request a new  "code.    [employer] Activation Code: [email or web address]  Expires: 1/29/2017  5:34 AM    If you have questions, you can email [email or web address] or call [phone] to talk to our [employer] staff. Remember, [employer] is NOT to be used for urgent needs. For medical emergencies, dial 911.               Other Info from Your Visit           Your Appointments     Apr 11, 2017  2:00 PM EDT   Follow Up with Janes Almazan MD   [facility] (--)    [address] [address] [phone]           Arrive 15 minutes prior to appointment.              Allergies     No Known Allergies      Reason for Visit     Annual Exam           Vital Signs     Blood Pressure Pulse Temperature Respirations    132/72 (BP Location: Right arm, Patient Position: Sitting, Cuff Size: Adult) 72 98 °F (36.7 °C) (Temporal Artery ) 16    Height Weight Body Mass Index Smoking Status    61\" (154.9 cm) 170 lb (77.1 kg) 32.12 kg/m2 Never Smoker      Problems and Diagnoses Noted     High blood pressure    Need for hepatitis C screening test    -  Primary    Screening for osteoporosis        Need for pneumococcal vaccine        Wellness examination          "

## 2017-03-03 RX ORDER — SITAGLIPTIN 100 MG/1
TABLET, FILM COATED ORAL
Qty: 90 TABLET | Refills: 0 | Status: SHIPPED | OUTPATIENT
Start: 2017-03-03 | End: 2017-06-01 | Stop reason: SDUPTHER

## 2017-03-17 DIAGNOSIS — F41.9 ANXIETY: ICD-10-CM

## 2017-03-17 DIAGNOSIS — F32.89 OTHER DEPRESSION: ICD-10-CM

## 2017-03-17 RX ORDER — PAROXETINE HYDROCHLORIDE 20 MG/1
20 TABLET, FILM COATED ORAL EVERY MORNING
Qty: 90 TABLET | Refills: 0 | Status: SHIPPED | OUTPATIENT
Start: 2017-03-17 | End: 2017-06-19 | Stop reason: SDUPTHER

## 2017-03-17 RX ORDER — GLIPIZIDE 10 MG/1
10 TABLET ORAL 2 TIMES DAILY
Qty: 180 TABLET | Refills: 0 | Status: SHIPPED | OUTPATIENT
Start: 2017-03-17 | End: 2017-06-19 | Stop reason: SDUPTHER

## 2017-04-10 ENCOUNTER — TELEPHONE (OUTPATIENT)
Dept: INTERNAL MEDICINE | Facility: CLINIC | Age: 62
End: 2017-04-10

## 2017-04-10 NOTE — TELEPHONE ENCOUNTER
She could eat something light to keep sugars up and take 1/2 of her usual diabetic medication.  Janes Almazan MD  10:27 AM  04/10/17

## 2017-04-10 NOTE — TELEPHONE ENCOUNTER
----- Message from Rico Arroyo sent at 4/10/2017  9:36 AM EDT -----  [phone]    PT IS SUPPOSED TO HAVE FASTING BLOOD WORK TOMORROW AFTER NOON AT 2PM, IS WANTING TO KNOW IF IT'S OKAY TO TAKE HER SUGAR PILL. COULD YOU PLEASE CALL PT OR LET ME KNOW AND I DON'T MIND.     THANKS

## 2017-04-10 NOTE — TELEPHONE ENCOUNTER
S/W PT, INFORMED THAT DR. DUMONT SAID FOR HER TO EAT LIGHT BREAKFAST AND TAKE HALF HER DOSE OF DIABETIC MEDS.  VERB GOOD UNDERSTANDING AND AGREEMENT.

## 2017-04-11 ENCOUNTER — OFFICE VISIT (OUTPATIENT)
Dept: INTERNAL MEDICINE | Facility: CLINIC | Age: 62
End: 2017-04-11

## 2017-04-11 VITALS
BODY MASS INDEX: 32.65 KG/M2 | TEMPERATURE: 97.8 F | WEIGHT: 172.8 LBS | DIASTOLIC BLOOD PRESSURE: 76 MMHG | HEART RATE: 68 BPM | SYSTOLIC BLOOD PRESSURE: 148 MMHG | RESPIRATION RATE: 18 BRPM

## 2017-04-11 DIAGNOSIS — E11.9 TYPE 2 DIABETES MELLITUS WITHOUT COMPLICATION, WITHOUT LONG-TERM CURRENT USE OF INSULIN (HCC): ICD-10-CM

## 2017-04-11 DIAGNOSIS — E78.5 HYPERLIPIDEMIA, UNSPECIFIED HYPERLIPIDEMIA TYPE: ICD-10-CM

## 2017-04-11 DIAGNOSIS — Z00.00 HEALTHCARE MAINTENANCE: ICD-10-CM

## 2017-04-11 DIAGNOSIS — I10 ESSENTIAL HYPERTENSION: Primary | ICD-10-CM

## 2017-04-11 PROCEDURE — 99214 OFFICE O/P EST MOD 30 MIN: CPT | Performed by: INTERNAL MEDICINE

## 2017-04-11 PROCEDURE — 36415 COLL VENOUS BLD VENIPUNCTURE: CPT | Performed by: INTERNAL MEDICINE

## 2017-04-11 RX ORDER — IBUPROFEN 200 MG
600 TABLET ORAL 2 TIMES DAILY
COMMUNITY
End: 2017-08-15

## 2017-04-11 NOTE — PROGRESS NOTES
Chief Complaint   Patient presents with   • Follow-up     4 MONTH       History of Present Illness      The patient presents for a follow-up related to diabetes and reports that she checks her blood sugars at home. Her sugars are checked infrequently. The average sugars are in the 100-150 range. She denies hypoglycemic symptoms. The patient denies polyuria, polydypsia or polyphagia. The patient had an eye examination in October of 2016. She usually sees an ophthalmologist for her eye examinations. The eye examinations have revealed no retinopathy. She reports no symptoms of neuropathy. The patient takes her medication as prescribed. She is not taking insulin. The patient does check her feet daily at home. She denies chest pain, shortness of breath, orthopnea, paroxysmal nocturnal dyspnea, dyspnea on exertion, edema, palpitations or syncope.    The patient presents for a follow-up related to hyperlipidemia. She is following a low fat diet. She reports that she is exercising. She is not taking medication for hyperlipidemia.    The patient presents for a follow-up related to hypertension. The patient reports that she has had no headaches or blurred vision. She states that she is taking her medication as prescribed. She is not having medication side effects.    Review of Systems    CARDIOVASCULAR- Denies Claudication.    PULMONARY- Denies Wheezing, Sputum Production, Cough or Hemoptysis.    Medications      Current Outpatient Prescriptions:   •  amLODIPine (NORVASC) 5 MG tablet, TAKE (1) TABLET DAILY FOR HEART., Disp: 90 tablet, Rfl: 1  •  aspirin 81 MG tablet, Take  by mouth daily., Disp: , Rfl:   •  atenolol (TENORMIN) 100 MG tablet, TAKE (1) TABLET DAILY FOR HEART., Disp: 90 tablet, Rfl: 1  •  CILOXAN 0.3 % ophthalmic ointment, , Disp: , Rfl:   •  dorzolamide-timolol (COSOPT) 22.3-6.8 MG/ML ophthalmic solution, Apply  to eye 2 (two) times a day., Disp: , Rfl:   •  glipiZIDE (GLUCOTROL) 10 MG tablet, Take 1 tablet by  mouth 2 (Two) Times a Day., Disp: 180 tablet, Rfl: 0  •  glucose blood (ONE TOUCH ULTRA TEST) test strip, TEST ONCE DAILY AND AS NEEDED, Disp: 100 each, Rfl: 3  •  hydrochlorothiazide (HYDRODIURIL) 25 MG tablet, TAKE (1) TABLET DAILY FOR FLUID., Disp: 90 tablet, Rfl: 1  •  ibuprofen (ADVIL,MOTRIN) 200 MG tablet, Take 600 mg by mouth 2 (Two) Times a Day., Disp: , Rfl:   •  JANUVIA 100 MG tablet, TAKE 1 TABLET ONCE DAILY FOR DIABETES, Disp: 90 tablet, Rfl: 0  •  Lancets misc, USE TO TEST ONCE DAILY AND AS NEEDED, Disp: 100 each, Rfl: 3  •  latanoprost (XALATAN) 0.005 % ophthalmic solution, , Disp: , Rfl:   •  lisinopril (PRINIVIL,ZESTRIL) 40 MG tablet, TAKE 1 TABLET DAILY, Disp: 90 tablet, Rfl: 1  •  melatonin 1 MG tablet, Take 3 mg by mouth every night., Disp: , Rfl:   •  metFORMIN (GLUCOPHAGE) 1000 MG tablet, TAKE 1 TABLET TWICE A DAY, Disp: 180 tablet, Rfl: 1  •  Multiple Vitamins-Minerals (ICAPS AREDS FORMULA PO), Take  by mouth 2 (two) times a day., Disp: , Rfl:   •  PARoxetine (PAXIL) 20 MG tablet, Take 1 tablet by mouth Every Morning., Disp: 90 tablet, Rfl: 0     Allergies    No Known Allergies    Problem List    Patient Active Problem List   Diagnosis   • Abnormal mammogram   • Anemia   • Depression   • Diabetes mellitus   • Hyperlipidemia   • Hypertension   • Anxiety       Medications, Allergies, Problems List and Past History were reviewed and updated.    Physical Examination    /76 (BP Location: Right arm, Patient Position: Sitting, Cuff Size: Adult)  Pulse 68  Temp 97.8 °F (36.6 °C) (Temporal Artery )   Resp 18  Wt 172 lb 12.8 oz (78.4 kg)  BMI 32.65 kg/m2    HEENT: Head- Normocephalic Atraumatic. Facies- Within normal limits. Pinnas- Normal texture and shape bilaterally. Canals- Normal bilaterally. TMs- Normal bilaterally. Nares- Patent bilaterally. Nasal Septum- is normal. There is no tenderness to palpation over the frontal or maxillary sinuses. Lids- Normal bilaterally. Conjunctiva- Clear  bilaterally. Sclera- Anicteric bilaterally. Oropharynx- Moist with no lesions. Tonsils- No enlargement, erythema or exudate.    Neck: Thyroid- non enlarged, symmetric and has no nodules. No bruits are detected. ROM- Normal Range of Motion with no rigidity.    Lungs: Auscultation- Clear to auscultation bilaterally. There are no retractions, clubbing or cyanosis. The Expiratory to Inspiratory ratio is equal.    Cardiovascular: There are no carotid bruits. Heart- Normal Rate with Regular rhythm and no murmurs. There are no gallops. There are no rubs. In the lower extremities there is no edema. The upper extremities do not have edema.    Abdomen: Soft, benign, non-tender with no masses, hernias, organomegaly or scars.    Impression and Assessment    Diabetes Mellitus Type 2- controlled.     Hyperlipidemia.     Hypertension.     Plan    Hyperlipidemia Plan: She was instructed to eat a low fat diet. She was encouraged to exercise daily.    Hypertension Plan: The current plan was continued.    Diabetes Mellitus Type 2- controlled Plan: The current plan was continued.    The following was ordered for screening and health maintenance: Hepatitis C Antibody.    Zeinab was seen today for follow-up.    Diagnoses and all orders for this visit:    Essential hypertension  -     Comprehensive Metabolic Panel    Type 2 diabetes mellitus without complication, without long-term current use of insulin  -     Comprehensive Metabolic Panel  -     Hemoglobin A1c    Hyperlipidemia, unspecified hyperlipidemia type  -     Comprehensive Metabolic Panel  -     Lipid Panel    Healthcare maintenance  -     Hepatitis C Antibody        Return to Office    The patient was instructed to return for follow-up in 4 months.    The patient was instructed to return sooner if the condition changes, worsens, or doesn't resolve.

## 2017-04-12 LAB
ALBUMIN SERPL-MCNC: 4.1 G/DL (ref 3.2–4.8)
ALBUMIN/GLOB SERPL: 1.5 G/DL (ref 1.5–2.5)
ALP SERPL-CCNC: 71 U/L (ref 25–100)
ALT SERPL-CCNC: 26 U/L (ref 7–40)
AST SERPL-CCNC: 29 U/L (ref 0–33)
BILIRUB SERPL-MCNC: 0.5 MG/DL (ref 0.3–1.2)
BUN SERPL-MCNC: 12 MG/DL (ref 9–23)
BUN/CREAT SERPL: 20 (ref 7–25)
CALCIUM SERPL-MCNC: 9.4 MG/DL (ref 8.7–10.4)
CHLORIDE SERPL-SCNC: 105 MMOL/L (ref 99–109)
CHOLEST SERPL-MCNC: 190 MG/DL (ref 0–200)
CO2 SERPL-SCNC: 31 MMOL/L (ref 20–31)
CREAT SERPL-MCNC: 0.6 MG/DL (ref 0.6–1.3)
GLOBULIN SER CALC-MCNC: 2.7 GM/DL
GLUCOSE SERPL-MCNC: 142 MG/DL (ref 70–100)
HBA1C MFR BLD: 7.8 % (ref 4.8–5.6)
HCV AB S/CO SERPL IA: <0.1 S/CO RATIO (ref 0–0.9)
HDLC SERPL-MCNC: 42 MG/DL (ref 40–60)
LDLC SERPL CALC-MCNC: 112 MG/DL (ref 0–100)
POTASSIUM SERPL-SCNC: 4 MMOL/L (ref 3.5–5.5)
PROT SERPL-MCNC: 6.8 G/DL (ref 5.7–8.2)
SODIUM SERPL-SCNC: 139 MMOL/L (ref 132–146)
TRIGL SERPL-MCNC: 179 MG/DL (ref 0–150)
VLDLC SERPL CALC-MCNC: 35.8 MG/DL

## 2017-05-03 ENCOUNTER — TELEPHONE (OUTPATIENT)
Dept: INTERNAL MEDICINE | Facility: CLINIC | Age: 62
End: 2017-05-03

## 2017-06-01 RX ORDER — SITAGLIPTIN 100 MG/1
TABLET, FILM COATED ORAL
Qty: 90 TABLET | Refills: 0 | Status: SHIPPED | OUTPATIENT
Start: 2017-06-01 | End: 2017-08-29 | Stop reason: SDUPTHER

## 2017-06-19 ENCOUNTER — TELEPHONE (OUTPATIENT)
Dept: INTERNAL MEDICINE | Facility: CLINIC | Age: 62
End: 2017-06-19

## 2017-06-19 DIAGNOSIS — F32.89 OTHER DEPRESSION: ICD-10-CM

## 2017-06-19 DIAGNOSIS — F41.9 ANXIETY: ICD-10-CM

## 2017-06-19 RX ORDER — GLIPIZIDE 10 MG/1
10 TABLET ORAL 2 TIMES DAILY
Qty: 180 TABLET | Refills: 1 | Status: SHIPPED | OUTPATIENT
Start: 2017-06-19 | End: 2017-12-15 | Stop reason: SDUPTHER

## 2017-06-19 RX ORDER — PAROXETINE HYDROCHLORIDE 20 MG/1
20 TABLET, FILM COATED ORAL EVERY MORNING
Qty: 90 TABLET | Refills: 1 | Status: SHIPPED | OUTPATIENT
Start: 2017-06-19 | End: 2017-12-14 | Stop reason: SDUPTHER

## 2017-06-19 NOTE — TELEPHONE ENCOUNTER
----- Message from Arabella Lorenz sent at 6/19/2017 12:35 PM EDT -----  [phone]    PT SPOKE TO PHARMACY THIS AM-WAS TOLD THEY REQUESTED REFILLS ON GLYPIZIDE 10 MG 90 DAY SUPPLY AND PAROXETINE 20MG 90 DAY SUPPLY AND HAVE NOT HEARD BACK.  PLEASE REFILL    [facility] [phone]

## 2017-06-19 NOTE — TELEPHONE ENCOUNTER
Please call in 6 month supply (Either 1 month with RF 5 or 3 months with RF 1).  Janes Almazan MD  1:09 PM  06/19/17

## 2017-07-17 RX ORDER — ATENOLOL 100 MG/1
TABLET ORAL
Qty: 90 TABLET | Refills: 0 | Status: SHIPPED | OUTPATIENT
Start: 2017-07-17 | End: 2017-10-16 | Stop reason: SDUPTHER

## 2017-07-17 RX ORDER — AMLODIPINE BESYLATE 5 MG/1
TABLET ORAL
Qty: 90 TABLET | Refills: 0 | Status: SHIPPED | OUTPATIENT
Start: 2017-07-17 | End: 2017-10-16 | Stop reason: SDUPTHER

## 2017-07-17 RX ORDER — LISINOPRIL 40 MG/1
TABLET ORAL
Qty: 90 TABLET | Refills: 0 | Status: SHIPPED | OUTPATIENT
Start: 2017-07-17 | End: 2017-10-16 | Stop reason: SDUPTHER

## 2017-07-17 RX ORDER — HYDROCHLOROTHIAZIDE 25 MG/1
TABLET ORAL
Qty: 90 TABLET | Refills: 0 | Status: SHIPPED | OUTPATIENT
Start: 2017-07-17 | End: 2017-10-16 | Stop reason: SDUPTHER

## 2017-08-15 ENCOUNTER — OFFICE VISIT (OUTPATIENT)
Dept: INTERNAL MEDICINE | Facility: CLINIC | Age: 62
End: 2017-08-15

## 2017-08-15 VITALS
TEMPERATURE: 97.6 F | WEIGHT: 171 LBS | SYSTOLIC BLOOD PRESSURE: 138 MMHG | DIASTOLIC BLOOD PRESSURE: 70 MMHG | RESPIRATION RATE: 16 BRPM | HEART RATE: 64 BPM | BODY MASS INDEX: 32.31 KG/M2

## 2017-08-15 DIAGNOSIS — L71.9 ROSACEA: Primary | ICD-10-CM

## 2017-08-15 DIAGNOSIS — R53.83 FATIGUE, UNSPECIFIED TYPE: ICD-10-CM

## 2017-08-15 DIAGNOSIS — E78.5 HYPERLIPIDEMIA, UNSPECIFIED HYPERLIPIDEMIA TYPE: ICD-10-CM

## 2017-08-15 DIAGNOSIS — E11.9 TYPE 2 DIABETES MELLITUS WITHOUT COMPLICATION, WITHOUT LONG-TERM CURRENT USE OF INSULIN (HCC): ICD-10-CM

## 2017-08-15 DIAGNOSIS — R06.83 SNORING: ICD-10-CM

## 2017-08-15 DIAGNOSIS — I10 ESSENTIAL HYPERTENSION: ICD-10-CM

## 2017-08-15 LAB
A/C: NORMAL
ALBUMIN SERPL-MCNC: 4.2 G/DL (ref 3.2–4.8)
ALBUMIN/GLOB SERPL: 1.6 G/DL (ref 1.5–2.5)
ALP SERPL-CCNC: 78 U/L (ref 25–100)
ALT SERPL-CCNC: 32 U/L (ref 7–40)
AST SERPL-CCNC: 29 U/L (ref 0–33)
BASOPHILS # BLD AUTO: 0.02 10*3/MM3 (ref 0–0.2)
BASOPHILS NFR BLD AUTO: 0.4 % (ref 0–1)
BILIRUB SERPL-MCNC: 0.6 MG/DL (ref 0.3–1.2)
BUN SERPL-MCNC: 12 MG/DL (ref 9–23)
BUN/CREAT SERPL: 15 (ref 7–25)
CALCIUM SERPL-MCNC: 9.7 MG/DL (ref 8.7–10.4)
CHLORIDE SERPL-SCNC: 100 MMOL/L (ref 99–109)
CO2 SERPL-SCNC: 34 MMOL/L (ref 20–31)
CREAT SERPL-MCNC: 0.8 MG/DL (ref 0.6–1.3)
EOSINOPHIL # BLD AUTO: 0.18 10*3/MM3 (ref 0–0.3)
EOSINOPHIL NFR BLD AUTO: 3.9 % (ref 0–3)
ERYTHROCYTE [DISTWIDTH] IN BLOOD BY AUTOMATED COUNT: 13.9 % (ref 11.3–14.5)
EXPIRATION DATE: NORMAL
GLOBULIN SER CALC-MCNC: 2.6 GM/DL
GLUCOSE SERPL-MCNC: 344 MG/DL (ref 70–100)
HBA1C MFR BLD: 8.6 % (ref 4.8–5.6)
HCT VFR BLD AUTO: 44.5 % (ref 34.5–44)
HGB BLD-MCNC: 14.7 G/DL (ref 11.5–15.5)
IMM GRANULOCYTES # BLD: 0.02 10*3/MM3 (ref 0–0.03)
IMM GRANULOCYTES NFR BLD: 0.4 % (ref 0–0.6)
LYMPHOCYTES # BLD AUTO: 1.29 10*3/MM3 (ref 0.6–4.8)
LYMPHOCYTES NFR BLD AUTO: 27.8 % (ref 24–44)
Lab: NORMAL
MCH RBC QN AUTO: 28.9 PG (ref 27–31)
MCHC RBC AUTO-ENTMCNC: 33 G/DL (ref 32–36)
MCV RBC AUTO: 87.4 FL (ref 80–99)
MONOCYTES # BLD AUTO: 0.38 10*3/MM3 (ref 0–1)
MONOCYTES NFR BLD AUTO: 8.2 % (ref 0–12)
NEUTROPHILS # BLD AUTO: 2.75 10*3/MM3 (ref 1.5–8.3)
NEUTROPHILS NFR BLD AUTO: 59.3 % (ref 41–71)
PLATELET # BLD AUTO: 144 10*3/MM3 (ref 150–450)
POC CREATININE URINE: NORMAL
POC MICROALBUMIN URINE: NORMAL
POTASSIUM SERPL-SCNC: 4.1 MMOL/L (ref 3.5–5.5)
PROT SERPL-MCNC: 6.8 G/DL (ref 5.7–8.2)
RBC # BLD AUTO: 5.09 10*6/MM3 (ref 3.89–5.14)
SODIUM SERPL-SCNC: 139 MMOL/L (ref 132–146)
TSH SERPL DL<=0.005 MIU/L-ACNC: 0.75 MIU/ML (ref 0.35–5.35)
WBC # BLD AUTO: 4.64 10*3/MM3 (ref 3.5–10.8)

## 2017-08-15 PROCEDURE — 36415 COLL VENOUS BLD VENIPUNCTURE: CPT | Performed by: INTERNAL MEDICINE

## 2017-08-15 PROCEDURE — 82044 UR ALBUMIN SEMIQUANTITATIVE: CPT | Performed by: INTERNAL MEDICINE

## 2017-08-15 PROCEDURE — 99214 OFFICE O/P EST MOD 30 MIN: CPT | Performed by: INTERNAL MEDICINE

## 2017-08-15 RX ORDER — METRONIDAZOLE 10 MG/G
GEL TOPICAL DAILY
Qty: 60 G | Refills: 3 | Status: SHIPPED | OUTPATIENT
Start: 2017-08-15

## 2017-08-15 NOTE — PROGRESS NOTES
Chief Complaint   Patient presents with   • Follow-up     4 month       History of Present Illness      The patient presents for a follow-up related to hyperlipidemia. She is following a low fat diet. She reports that she is not exercising. She is not taking medication for hyperlipidemia. She denies chest pain, shortness of breath, orthopnea, paroxysmal nocturnal dyspnea, dyspnea on exertion, edema, palpitations or syncope.    The patient presents for a follow-up related to hypertension. The patient reports that she has had no headaches or blurred vision. She states that she is taking her medication as prescribed. She is not having medication side effects. She does not check her blood pressures at home.    The patient presents for a follow-up related to diabetes and reports that she doesn't check her blood sugars at home. She denies hypoglycemic symptoms. The patient denies polyuria, polydypsia or polyphagia. She reports no symptoms of neuropathy. The patient takes her medication as prescribed. She is not taking insulin. The patient does check her feet daily at home.    The patient has complaints of being fatigued. She describes her symptoms as decreased energy, being sleepy, being tired, drowsiness and being sluggish and the symptoms have been present for six months.       She denies tearfulness, loneliness, hopelessness, anxiety or suicidal thoughts.       The patient denies a dry cough, a wet cough, wheezing, fever, facial pain, eye drainage, ear pain, ear drainage, nausea, vomiting, diarrhea, sore throat, abdominal pain, nasal discharge, decreased appetite, chills, rectal bleeding, night sweats or myalgias. There are no symptoms of heat intolerance, cold intolerance, excessive hair loss or constipation. There are no known exposures to mononucleosis. The patient does not exercise regularly.    The patient falls asleep easily and typically doesn't awaken at night. The patient snores and reports gasping or stopping  breathing at night. She feels tired during the day and she denies falling asleep while watching television, while driving or during conversation.       She presents for an initial evaluation with a rash on the left side of her face and her forehead. This has been present for six months. The condition is oozing, scaling, itchy, irritated and more noticeable. There are no exposures to people with similar lesions. There is no history of new cosmetic or detergent usage on the affected area. No prior treatment has been administered.    Medications      Current Outpatient Prescriptions:   •  amLODIPine (NORVASC) 5 MG tablet, TAKE (1) TABLET DAILY FOR HEART., Disp: 90 tablet, Rfl: 0  •  aspirin 81 MG tablet, Take  by mouth daily., Disp: , Rfl:   •  atenolol (TENORMIN) 100 MG tablet, TAKE (1) TABLET DAILY FOR HEART., Disp: 90 tablet, Rfl: 0  •  CILOXAN 0.3 % ophthalmic ointment, , Disp: , Rfl:   •  dorzolamide-timolol (COSOPT) 22.3-6.8 MG/ML ophthalmic solution, Apply  to eye 2 (two) times a day., Disp: , Rfl:   •  glipiZIDE (GLUCOTROL) 10 MG tablet, Take 1 tablet by mouth 2 (Two) Times a Day., Disp: 180 tablet, Rfl: 1  •  glucose blood (ONE TOUCH ULTRA TEST) test strip, TEST ONCE DAILY AND AS NEEDED, Disp: 100 each, Rfl: 3  •  hydrochlorothiazide (HYDRODIURIL) 25 MG tablet, TAKE (1) TABLET DAILY FOR FLUID., Disp: 90 tablet, Rfl: 0  •  JANUVIA 100 MG tablet, TAKE 1 TABLET ONCE DAILY FOR DIABETES, Disp: 90 tablet, Rfl: 0  •  Lancets misc, USE TO TEST ONCE DAILY AND AS NEEDED, Disp: 100 each, Rfl: 3  •  latanoprost (XALATAN) 0.005 % ophthalmic solution, , Disp: , Rfl:   •  lisinopril (PRINIVIL,ZESTRIL) 40 MG tablet, TAKE 1 TABLET DAILY, Disp: 90 tablet, Rfl: 0  •  melatonin 1 MG tablet, Take 3 mg by mouth every night., Disp: , Rfl:   •  metFORMIN (GLUCOPHAGE) 1000 MG tablet, Take 1 tablet by mouth 2 (Two) Times a Day With Meals., Disp: 180 tablet, Rfl: 1  •  Multiple Vitamins-Minerals (ICAPS AREDS FORMULA PO), Take  by mouth  2 (two) times a day., Disp: , Rfl:   •  PARoxetine (PAXIL) 20 MG tablet, Take 1 tablet by mouth Every Morning., Disp: 90 tablet, Rfl: 1  •  metroNIDAZOLE (METROGEL) 1 % gel, Apply  topically Daily., Disp: 60 g, Rfl: 3     Allergies    No Known Allergies    Problem List    Patient Active Problem List   Diagnosis   • Abnormal mammogram   • Anemia   • Depression   • Diabetes mellitus   • Hyperlipidemia   • Hypertension   • Anxiety       Medications, Allergies, Problems List and Past History were reviewed and updated.    Physical Examination    /70 (BP Location: Right arm, Patient Position: Sitting, Cuff Size: Adult)  Pulse 64  Temp 97.6 °F (36.4 °C) (Temporal Artery )   Resp 16  Wt 171 lb (77.6 kg)  LMP  (LMP Unknown)  Breastfeeding? No  BMI 32.31 kg/m2      HEENT: Head- Normocephalic Atraumatic. Facies- Within normal limits. Pinnas- Normal texture and shape bilaterally. Canals- Normal bilaterally. TMs- Normal bilaterally. Nares- Patent bilaterally. Nasal Septum- is normal. There is no tenderness to palpation over the frontal or maxillary sinuses. Lids- Normal bilaterally. Conjunctiva- Clear bilaterally. Sclera- Anicteric bilaterally. Oropharynx- Moist with no lesions. Tonsils- No enlargement, erythema or exudate.    Neck: Thyroid- non enlarged, symmetric and has no nodules. No bruits are detected. ROM- Normal Range of Motion with no rigidity.    Lymph Nodes: Cervical- no enlarged lymph nodes noted. Clavicular- Deferred. Axillary- Deferred. Inguinal- Deferred.    Lungs: Auscultation- Clear to auscultation bilaterally. There are no retractions, clubbing or cyanosis. The Expiratory to Inspiratory ratio is equal.    Cardiovascular: There are no carotid bruits. Heart- Normal Rate with Regular rhythm and no murmurs. There are no gallops. There are no rubs. In the lower extremities there is no edema. The upper extremities do not have edema.    Abdomen: Soft, benign, non-tender with no masses, hernias,  organomegaly or scars.    The patient has a rash on the left side of her face and her forehead. The rash is bright red. The affected skin is crusted and pustular and the condition is noted to be spread over a wide area.    Impression and Assessment    Rosacea.    Hyperlipidemia.    Essential Hypertension.    Type 2 Diabetes Mellitus without complication.    Fatigue.    Snoring.    Plan    Hyperlipidemia Plan: She was instructed to eat a low fat diet. She was encouraged to exercise daily. The patient was instructed to continue the current medications.    Essential Hypertension Plan: The current plan was continued.    Type 2 Diabetes Mellitus without complication Plan: Further plans will be formulated after test results are reviewed.    Rosacea Plan: Medication was added as noted below.    Fatigue Plan: Further plans will be made after test results are received and reviewed.    Snoring Plan: A referral was made to sleep medicine.    Zeinab was seen today for follow-up.    Diagnoses and all orders for this visit:    Rosacea  -     metroNIDAZOLE (METROGEL) 1 % gel; Apply  topically Daily.    Hyperlipidemia, unspecified hyperlipidemia type  -     Comprehensive Metabolic Panel    Essential hypertension  -     Comprehensive Metabolic Panel    Type 2 diabetes mellitus without complication, without long-term current use of insulin  -     Comprehensive Metabolic Panel  -     Hemoglobin A1c  -     POC Microalbumin    Fatigue, unspecified type  -     Comprehensive Metabolic Panel  -     CBC & Differential  -     TSH  -     Ambulatory Referral to Sleep Medicine    Snoring  -     Ambulatory Referral to Sleep Medicine        Return to Office    The patient was instructed to return for follow-up in 4 months.    The patient was instructed to return sooner if the condition changes, worsens, or doesn't resolve.

## 2017-08-29 RX ORDER — SITAGLIPTIN 100 MG/1
TABLET, FILM COATED ORAL
Qty: 90 TABLET | Refills: 1 | Status: SHIPPED | OUTPATIENT
Start: 2017-08-29 | End: 2018-06-04 | Stop reason: SDUPTHER

## 2017-10-16 RX ORDER — LISINOPRIL 40 MG/1
TABLET ORAL
Qty: 90 TABLET | Refills: 0 | Status: SHIPPED | OUTPATIENT
Start: 2017-10-16 | End: 2018-01-12 | Stop reason: SDUPTHER

## 2017-10-16 RX ORDER — ATENOLOL 100 MG/1
TABLET ORAL
Qty: 90 TABLET | Refills: 0 | Status: SHIPPED | OUTPATIENT
Start: 2017-10-16 | End: 2018-01-12 | Stop reason: SDUPTHER

## 2017-10-16 RX ORDER — AMLODIPINE BESYLATE 5 MG/1
TABLET ORAL
Qty: 90 TABLET | Refills: 0 | Status: SHIPPED | OUTPATIENT
Start: 2017-10-16 | End: 2018-01-12 | Stop reason: SDUPTHER

## 2017-10-16 RX ORDER — HYDROCHLOROTHIAZIDE 25 MG/1
TABLET ORAL
Qty: 90 TABLET | Refills: 0 | Status: SHIPPED | OUTPATIENT
Start: 2017-10-16 | End: 2018-01-12 | Stop reason: SDUPTHER

## 2017-12-14 DIAGNOSIS — F32.89 OTHER DEPRESSION: ICD-10-CM

## 2017-12-14 DIAGNOSIS — F41.9 ANXIETY: ICD-10-CM

## 2017-12-14 RX ORDER — PAROXETINE HYDROCHLORIDE 20 MG/1
TABLET, FILM COATED ORAL
Qty: 90 TABLET | Refills: 0 | Status: SHIPPED | OUTPATIENT
Start: 2017-12-14 | End: 2018-03-13 | Stop reason: SDUPTHER

## 2017-12-15 RX ORDER — GLIPIZIDE 10 MG/1
TABLET ORAL
Qty: 180 TABLET | Refills: 0 | Status: SHIPPED | OUTPATIENT
Start: 2017-12-15 | End: 2018-03-13 | Stop reason: SDUPTHER

## 2018-01-15 RX ORDER — AMLODIPINE BESYLATE 5 MG/1
TABLET ORAL
Qty: 90 TABLET | Refills: 0 | Status: SHIPPED | OUTPATIENT
Start: 2018-01-15 | End: 2018-04-11 | Stop reason: SDUPTHER

## 2018-01-15 RX ORDER — LISINOPRIL 40 MG/1
TABLET ORAL
Qty: 90 TABLET | Refills: 0 | Status: SHIPPED | OUTPATIENT
Start: 2018-01-15 | End: 2018-04-11 | Stop reason: SDUPTHER

## 2018-01-15 RX ORDER — ATENOLOL 100 MG/1
TABLET ORAL
Qty: 90 TABLET | Refills: 0 | Status: SHIPPED | OUTPATIENT
Start: 2018-01-15 | End: 2018-10-02

## 2018-01-15 RX ORDER — HYDROCHLOROTHIAZIDE 25 MG/1
TABLET ORAL
Qty: 90 TABLET | Refills: 0 | Status: SHIPPED | OUTPATIENT
Start: 2018-01-15 | End: 2018-04-11 | Stop reason: SDUPTHER

## 2018-01-31 ENCOUNTER — OFFICE VISIT (OUTPATIENT)
Dept: INTERNAL MEDICINE | Facility: CLINIC | Age: 63
End: 2018-01-31

## 2018-01-31 VITALS
BODY MASS INDEX: 31.93 KG/M2 | TEMPERATURE: 97.9 F | RESPIRATION RATE: 18 BRPM | DIASTOLIC BLOOD PRESSURE: 70 MMHG | HEART RATE: 72 BPM | WEIGHT: 169 LBS | SYSTOLIC BLOOD PRESSURE: 122 MMHG

## 2018-01-31 DIAGNOSIS — E78.5 HYPERLIPIDEMIA, UNSPECIFIED HYPERLIPIDEMIA TYPE: Primary | ICD-10-CM

## 2018-01-31 DIAGNOSIS — E11.9 TYPE 2 DIABETES MELLITUS WITHOUT COMPLICATION, WITHOUT LONG-TERM CURRENT USE OF INSULIN (HCC): ICD-10-CM

## 2018-01-31 DIAGNOSIS — I10 ESSENTIAL HYPERTENSION: ICD-10-CM

## 2018-01-31 DIAGNOSIS — F33.41 RECURRENT MAJOR DEPRESSIVE DISORDER, IN PARTIAL REMISSION (HCC): ICD-10-CM

## 2018-01-31 DIAGNOSIS — Z23 NEED FOR INFLUENZA VACCINATION: ICD-10-CM

## 2018-01-31 LAB
A/C: NORMAL
ALBUMIN SERPL-MCNC: 4 G/DL (ref 3.2–4.8)
ALBUMIN/GLOB SERPL: 1.6 G/DL (ref 1.5–2.5)
ALP SERPL-CCNC: 73 U/L (ref 25–100)
ALT SERPL-CCNC: 26 U/L (ref 7–40)
AST SERPL-CCNC: 32 U/L (ref 0–33)
BILIRUB BLD-MCNC: NEGATIVE MG/DL
BILIRUB SERPL-MCNC: 0.5 MG/DL (ref 0.3–1.2)
BUN SERPL-MCNC: 14 MG/DL (ref 9–23)
BUN/CREAT SERPL: 17.5 (ref 7–25)
CALCIUM SERPL-MCNC: 9.2 MG/DL (ref 8.7–10.4)
CHLORIDE SERPL-SCNC: 100 MMOL/L (ref 99–109)
CHOLEST SERPL-MCNC: 170 MG/DL (ref 0–200)
CLARITY, POC: CLEAR
CO2 SERPL-SCNC: 30 MMOL/L (ref 20–31)
COLOR UR: YELLOW
CREAT SERPL-MCNC: 0.8 MG/DL (ref 0.6–1.3)
EXPIRATION DATE: ABNORMAL
EXPIRATION DATE: NORMAL
GFR SERPLBLD CREATININE-BSD FMLA CKD-EPI: 72 ML/MIN/1.73
GFR SERPLBLD CREATININE-BSD FMLA CKD-EPI: 88 ML/MIN/1.73
GLOBULIN SER CALC-MCNC: 2.5 GM/DL
GLUCOSE SERPL-MCNC: 303 MG/DL (ref 70–100)
GLUCOSE UR STRIP-MCNC: ABNORMAL MG/DL
HBA1C MFR BLD: 8.7 % (ref 4.8–5.6)
HDLC SERPL-MCNC: 39 MG/DL (ref 40–60)
KETONES UR QL: NEGATIVE
LDLC SERPL CALC-MCNC: 91 MG/DL (ref 0–100)
LEUKOCYTE EST, POC: NEGATIVE
Lab: ABNORMAL
Lab: NORMAL
NITRITE UR-MCNC: NEGATIVE MG/ML
PH UR: 5 [PH] (ref 5–8)
POC CREATININE URINE: 50
POC MICROALBUMIN URINE: 10
POTASSIUM SERPL-SCNC: 3.8 MMOL/L (ref 3.5–5.5)
PROT SERPL-MCNC: 6.5 G/DL (ref 5.7–8.2)
PROT UR STRIP-MCNC: NEGATIVE MG/DL
RBC # UR STRIP: NEGATIVE /UL
SODIUM SERPL-SCNC: 138 MMOL/L (ref 132–146)
SP GR UR: 1.02 (ref 1–1.03)
TRIGL SERPL-MCNC: 201 MG/DL (ref 0–150)
UROBILINOGEN UR QL: NORMAL
VLDLC SERPL CALC-MCNC: 40.2 MG/DL

## 2018-01-31 PROCEDURE — 90686 IIV4 VACC NO PRSV 0.5 ML IM: CPT | Performed by: INTERNAL MEDICINE

## 2018-01-31 PROCEDURE — 81003 URINALYSIS AUTO W/O SCOPE: CPT | Performed by: INTERNAL MEDICINE

## 2018-01-31 PROCEDURE — 82044 UR ALBUMIN SEMIQUANTITATIVE: CPT | Performed by: INTERNAL MEDICINE

## 2018-01-31 PROCEDURE — G0008 ADMIN INFLUENZA VIRUS VAC: HCPCS | Performed by: INTERNAL MEDICINE

## 2018-01-31 PROCEDURE — 99214 OFFICE O/P EST MOD 30 MIN: CPT | Performed by: INTERNAL MEDICINE

## 2018-01-31 RX ORDER — ATORVASTATIN CALCIUM 20 MG/1
20 TABLET, FILM COATED ORAL DAILY
Qty: 30 TABLET | Refills: 5 | Status: SHIPPED | OUTPATIENT
Start: 2018-01-31 | End: 2018-02-26 | Stop reason: SDUPTHER

## 2018-02-26 ENCOUNTER — TELEPHONE (OUTPATIENT)
Dept: INTERNAL MEDICINE | Facility: CLINIC | Age: 63
End: 2018-02-26

## 2018-02-26 DIAGNOSIS — E78.5 HYPERLIPIDEMIA, UNSPECIFIED HYPERLIPIDEMIA TYPE: ICD-10-CM

## 2018-02-26 DIAGNOSIS — E11.9 TYPE 2 DIABETES MELLITUS WITHOUT COMPLICATION, WITHOUT LONG-TERM CURRENT USE OF INSULIN (HCC): ICD-10-CM

## 2018-02-26 RX ORDER — ATORVASTATIN CALCIUM 20 MG/1
20 TABLET, FILM COATED ORAL DAILY
Qty: 90 TABLET | Refills: 1 | Status: SHIPPED | OUTPATIENT
Start: 2018-02-26 | End: 2018-09-04 | Stop reason: SDUPTHER

## 2018-02-26 NOTE — TELEPHONE ENCOUNTER
----- Message from Shani Aden sent at 2/26/2018  4:16 PM EST -----  States Dr. Almazan added Lipitor to her medications and sent in as a 30 day supply.  She is doing well with medication and would like a 90 day supply sent to [facility] in [facility].  Patient can be reached at [phone] if needed.

## 2018-02-26 NOTE — TELEPHONE ENCOUNTER
Please call in 6 month supply (Either 1 month with RF 5 or 3 months with RF 1).  Janes Almazan MD  4:31 PM  02/26/18

## 2018-03-13 DIAGNOSIS — F41.9 ANXIETY: ICD-10-CM

## 2018-03-13 DIAGNOSIS — F32.89 OTHER DEPRESSION: ICD-10-CM

## 2018-03-13 RX ORDER — GLIPIZIDE 10 MG/1
TABLET ORAL
Qty: 180 TABLET | Refills: 0 | Status: SHIPPED | OUTPATIENT
Start: 2018-03-13 | End: 2018-06-12 | Stop reason: SDUPTHER

## 2018-03-13 RX ORDER — PAROXETINE HYDROCHLORIDE 20 MG/1
TABLET, FILM COATED ORAL
Qty: 90 TABLET | Refills: 0 | Status: SHIPPED | OUTPATIENT
Start: 2018-03-13 | End: 2018-06-12 | Stop reason: SDUPTHER

## 2018-04-11 RX ORDER — AMLODIPINE BESYLATE 5 MG/1
TABLET ORAL
Qty: 90 TABLET | Refills: 0 | Status: SHIPPED | OUTPATIENT
Start: 2018-04-11 | End: 2018-07-10 | Stop reason: SDUPTHER

## 2018-04-11 RX ORDER — HYDROCHLOROTHIAZIDE 25 MG/1
TABLET ORAL
Qty: 90 TABLET | Refills: 0 | Status: SHIPPED | OUTPATIENT
Start: 2018-04-11 | End: 2018-07-10 | Stop reason: SDUPTHER

## 2018-04-11 RX ORDER — LISINOPRIL 40 MG/1
TABLET ORAL
Qty: 90 TABLET | Refills: 0 | Status: SHIPPED | OUTPATIENT
Start: 2018-04-11 | End: 2018-07-10 | Stop reason: SDUPTHER

## 2018-04-18 RX ORDER — ATENOLOL 50 MG/1
TABLET ORAL
Qty: 180 TABLET | Refills: 1 | Status: SHIPPED | OUTPATIENT
Start: 2018-04-18 | End: 2019-02-05

## 2018-05-31 ENCOUNTER — OFFICE VISIT (OUTPATIENT)
Dept: INTERNAL MEDICINE | Facility: CLINIC | Age: 63
End: 2018-05-31

## 2018-05-31 VITALS
WEIGHT: 167.2 LBS | BODY MASS INDEX: 31.57 KG/M2 | DIASTOLIC BLOOD PRESSURE: 74 MMHG | SYSTOLIC BLOOD PRESSURE: 112 MMHG | OXYGEN SATURATION: 98 % | HEART RATE: 61 BPM | HEIGHT: 61 IN

## 2018-05-31 DIAGNOSIS — E11.9 TYPE 2 DIABETES MELLITUS WITHOUT COMPLICATION, WITHOUT LONG-TERM CURRENT USE OF INSULIN (HCC): Primary | ICD-10-CM

## 2018-05-31 DIAGNOSIS — F32.89 OTHER DEPRESSION: ICD-10-CM

## 2018-05-31 DIAGNOSIS — E78.5 HYPERLIPIDEMIA, UNSPECIFIED HYPERLIPIDEMIA TYPE: ICD-10-CM

## 2018-05-31 DIAGNOSIS — I10 ESSENTIAL HYPERTENSION: ICD-10-CM

## 2018-05-31 LAB
ALBUMIN SERPL-MCNC: 4.1 G/DL (ref 3.2–4.8)
ALBUMIN/GLOB SERPL: 1.6 G/DL (ref 1.5–2.5)
ALP SERPL-CCNC: 78 U/L (ref 25–100)
ALT SERPL-CCNC: 28 U/L (ref 7–40)
AST SERPL-CCNC: 26 U/L (ref 0–33)
BILIRUB SERPL-MCNC: 0.6 MG/DL (ref 0.3–1.2)
BUN SERPL-MCNC: 16 MG/DL (ref 9–23)
BUN/CREAT SERPL: 20 (ref 7–25)
CALCIUM SERPL-MCNC: 9.2 MG/DL (ref 8.7–10.4)
CHLORIDE SERPL-SCNC: 100 MMOL/L (ref 99–109)
CHOLEST SERPL-MCNC: 125 MG/DL (ref 0–200)
CO2 SERPL-SCNC: 30 MMOL/L (ref 20–31)
CREAT SERPL-MCNC: 0.8 MG/DL (ref 0.6–1.3)
GFR SERPLBLD CREATININE-BSD FMLA CKD-EPI: 72 ML/MIN/1.73
GFR SERPLBLD CREATININE-BSD FMLA CKD-EPI: 88 ML/MIN/1.73
GLOBULIN SER CALC-MCNC: 2.5 GM/DL
GLUCOSE SERPL-MCNC: 278 MG/DL (ref 70–100)
HBA1C MFR BLD: 9.8 % (ref 4.8–5.6)
HDLC SERPL-MCNC: 37 MG/DL (ref 40–60)
LDLC SERPL CALC-MCNC: 56 MG/DL (ref 0–100)
POTASSIUM SERPL-SCNC: 4.5 MMOL/L (ref 3.5–5.5)
PROT SERPL-MCNC: 6.6 G/DL (ref 5.7–8.2)
SODIUM SERPL-SCNC: 139 MMOL/L (ref 132–146)
TRIGL SERPL-MCNC: 158 MG/DL (ref 0–150)
VLDLC SERPL CALC-MCNC: 31.6 MG/DL

## 2018-05-31 PROCEDURE — 36415 COLL VENOUS BLD VENIPUNCTURE: CPT | Performed by: INTERNAL MEDICINE

## 2018-05-31 PROCEDURE — 99214 OFFICE O/P EST MOD 30 MIN: CPT | Performed by: INTERNAL MEDICINE

## 2018-05-31 RX ORDER — TIMOLOL MALEATE 5 MG/ML
SOLUTION/ DROPS OPHTHALMIC
COMMUNITY
Start: 2018-02-22 | End: 2018-10-02

## 2018-05-31 RX ORDER — BRINZOLAMIDE 1 %
SUSPENSION, DROPS(FINAL DOSAGE FORM)(ML) OPHTHALMIC (EYE)
COMMUNITY
Start: 2018-02-22 | End: 2018-10-02

## 2018-05-31 NOTE — PROGRESS NOTES
Chief Complaint   Patient presents with   • Hyperlipidemia     4 mo  fu   • Hypertension     4 mo fu   • Diabetes     Type 2. 4 mo fu       History of Present Illness      The patient presents for a follow-up related to Type 2 Diabetes Mellitus and reports that she checks her blood sugars at home. Her sugars are checked daily. The average sugars are in the 250-300 range. She denies hypoglycemic symptoms. The patient denies polyuria, polydypsia or polyphagia. She reports no symptoms of neuropathy. The patient takes her medication as prescribed. She is not taking insulin. The patient does check her feet daily at home. She denies chest pain, shortness of breath, orthopnea, paroxysmal nocturnal dyspnea, dyspnea on exertion, edema, palpitations or syncope.    The patient presents for a follow-up related to hyperlipidemia. She is following a low fat diet. She reports that she is exercising. She is taking atorvastatin. The patient is taking her medication as prescribed. She reports no medication side effects, including muscle cramps, abdominal pain, headaches or weakness.    The patient presents for a follow-up related to hypertension. The patient reports that she has had no headaches or blurred vision. She states that she is taking her medication as prescribed. She is not having medication side effects.    The patient presents for follow-up of depression. She denies currently having depression symptoms. She denies suicidal ideation. Her energy level is good. She denies agorophobia. She sleeps well. She is not tearful. She states that her current depression symptoms are stable. She is currently taking a medication. The current medication regimen consists of Paxil. The patient denies having medication side effects including nausea, headaches, anxiety, increased depression, anorgasmia or fatigue.    Review of Systems    GENERAL/CONSTITUTIONAL- Denies Unexplained Weight Loss, Fever, Chills, Sweats, Weakness or  Malaise.    CARDIOVASCULAR- Denies Claudication.    PULMONARY- Denies Wheezing, Sputum Production, Cough, Hemoptysis or Pleuritic Chest Pain.    GASTROINTESTINAL- Denies Abdominal Pain, Nausea, Vomiting, Diarrhea, Blood per Rectum, Constipation or Heartburn.    Medications      Current Outpatient Prescriptions:   •  amLODIPine (NORVASC) 5 MG tablet, TAKE (1) TABLET DAILY FOR HEART., Disp: 90 tablet, Rfl: 0  •  aspirin 81 MG tablet, Take  by mouth daily., Disp: , Rfl:   •  atenolol (TENORMIN) 50 MG tablet, TAKE 2 TABLETS DAILY (for heart), Disp: 180 tablet, Rfl: 1  •  atorvastatin (LIPITOR) 20 MG tablet, Take 1 tablet by mouth Daily., Disp: 90 tablet, Rfl: 1  •  AZOPT 1 % ophthalmic suspension, , Disp: , Rfl:   •  CILOXAN 0.3 % ophthalmic ointment, , Disp: , Rfl:   •  dorzolamide-timolol (COSOPT) 22.3-6.8 MG/ML ophthalmic solution, Apply  to eye 2 (two) times a day., Disp: , Rfl:   •  glipiZIDE (GLUCOTROL) 10 MG tablet, TAKE 1 TABLET TWICE DAILY FOR DIABETES, Disp: 180 tablet, Rfl: 0  •  glucose blood test strip, Use daily to check blood glucose for dx E11.9, Disp: 50 each, Rfl: 10  •  hydrochlorothiazide (HYDRODIURIL) 25 MG tablet, TAKE (1) TABLET DAILY FOR FLUID., Disp: 90 tablet, Rfl: 0  •  JANUVIA 100 MG tablet, TAKE 1 TABLET ONCE DAILY FOR DIABETES, Disp: 90 tablet, Rfl: 1  •  Lancets misc, USE TO TEST ONCE DAILY AND AS NEEDED, Disp: 100 each, Rfl: 3  •  latanoprost (XALATAN) 0.005 % ophthalmic solution, , Disp: , Rfl:   •  lisinopril (PRINIVIL,ZESTRIL) 40 MG tablet, TAKE 1 TABLET DAILY, Disp: 90 tablet, Rfl: 0  •  metFORMIN (GLUCOPHAGE) 1000 MG tablet, TAKE 1 TABLET TWICE A DAY (WITH MEALS), Disp: 180 tablet, Rfl: 0  •  metroNIDAZOLE (METROGEL) 1 % gel, Apply  topically Daily., Disp: 60 g, Rfl: 3  •  Multiple Vitamins-Minerals (ICAPS AREDS FORMULA PO), Take  by mouth 2 (two) times a day., Disp: , Rfl:   •  PARoxetine (PAXIL) 20 MG tablet, TAKE (1) TABLET DAILY IN THE MORNING., Disp: 90 tablet, Rfl: 0  •   "timolol (TIMOPTIC) 0.5 % ophthalmic solution, , Disp: , Rfl:   •  atenolol (TENORMIN) 100 MG tablet, TAKE (1) TABLET DAILY FOR HEART., Disp: 90 tablet, Rfl: 0     Allergies    No Known Allergies    Problem List    Patient Active Problem List   Diagnosis   • Abnormal mammogram   • Anemia   • Depression   • Diabetes mellitus   • Hyperlipidemia   • Hypertension   • Anxiety       Medications, Allergies, Problems List and Past History were reviewed and updated.    Physical Examination    /74   Pulse 61   Ht 154.9 cm (60.98\")   Wt 75.8 kg (167 lb 3.2 oz)   LMP  (LMP Unknown)   SpO2 98%   BMI 31.61 kg/m²     HEENT: Head- Normocephalic Atraumatic. Facies- Within normal limits. Pinnas- Normal texture and shape bilaterally. Canals- Normal bilaterally. TMs- Normal bilaterally. Nares- Patent bilaterally. Nasal Septum- is normal. There is no tenderness to palpation over the frontal or maxillary sinuses. Lids- Normal bilaterally. Conjunctiva- Clear bilaterally. Sclera- Anicteric bilaterally. Oropharynx- Moist with no lesions. Tonsils- No enlargement, erythema or exudate.    Neck: Thyroid- non enlarged, symmetric and has no nodules. No bruits are detected. ROM- Normal Range of Motion with no rigidity.    Lungs: Auscultation- Clear to auscultation bilaterally. There are no retractions, clubbing or cyanosis. The Expiratory to Inspiratory ratio is equal.    Cardiovascular: There are no carotid bruits. Heart- Normal Rate with Regular rhythm and no murmurs. There are no gallops. There are no rubs. In the lower extremities there is no edema. The upper extremities do not have edema.    Abdomen: Soft, benign, non-tender with no masses, hernias, organomegaly or scars.    Impression and Assessment    Type 2 Diabetes Mellitus.    Hyperlipidemia.    Essential Hypertension.    Major Depressive Disorder Single Episode Unspecified.    Plan    Hyperlipidemia Plan: The patient was instructed to exercise daily, eat a low fat diet and " continue her medications.    Essential Hypertension Plan: The current plan was continued.    Type 2 Diabetes Mellitus Plan: The current plan was continued.    Major Depressive Disorder Single Episode Unspecified Plan: The current plan was continued.    Zeinab was seen today for hyperlipidemia, hypertension and diabetes.    Diagnoses and all orders for this visit:    Type 2 diabetes mellitus without complication, without long-term current use of insulin  -     Comprehensive Metabolic Panel  -     Hemoglobin A1c    Essential hypertension  -     Comprehensive Metabolic Panel    Hyperlipidemia, unspecified hyperlipidemia type  -     Comprehensive Metabolic Panel  -     Lipid Panel    Other depression        Return to Office    The patient was instructed to return for follow-up in 4 months.    The patient was instructed to return sooner if the condition changes, worsens, or doesn't resolve.

## 2018-06-04 RX ORDER — SITAGLIPTIN 100 MG/1
TABLET, FILM COATED ORAL
Qty: 90 TABLET | Refills: 0 | Status: SHIPPED | OUTPATIENT
Start: 2018-06-04 | End: 2018-09-04 | Stop reason: SDUPTHER

## 2018-06-12 DIAGNOSIS — F32.89 OTHER DEPRESSION: ICD-10-CM

## 2018-06-12 DIAGNOSIS — F41.9 ANXIETY: ICD-10-CM

## 2018-06-12 RX ORDER — PAROXETINE HYDROCHLORIDE 20 MG/1
TABLET, FILM COATED ORAL
Qty: 90 TABLET | Refills: 1 | Status: SHIPPED | OUTPATIENT
Start: 2018-06-12 | End: 2018-12-07 | Stop reason: SDUPTHER

## 2018-06-12 RX ORDER — GLIPIZIDE 10 MG/1
TABLET ORAL
Qty: 180 TABLET | Refills: 1 | Status: SHIPPED | OUTPATIENT
Start: 2018-06-12 | End: 2018-12-07 | Stop reason: SDUPTHER

## 2018-07-10 RX ORDER — HYDROCHLOROTHIAZIDE 25 MG/1
TABLET ORAL
Qty: 90 TABLET | Refills: 0 | Status: SHIPPED | OUTPATIENT
Start: 2018-07-10 | End: 2018-10-08 | Stop reason: SDUPTHER

## 2018-07-10 RX ORDER — LISINOPRIL 40 MG/1
TABLET ORAL
Qty: 90 TABLET | Refills: 0 | Status: SHIPPED | OUTPATIENT
Start: 2018-07-10 | End: 2018-10-08 | Stop reason: SDUPTHER

## 2018-07-10 RX ORDER — AMLODIPINE BESYLATE 5 MG/1
TABLET ORAL
Qty: 90 TABLET | Refills: 0 | Status: SHIPPED | OUTPATIENT
Start: 2018-07-10 | End: 2018-10-08 | Stop reason: SDUPTHER

## 2018-09-04 DIAGNOSIS — E11.9 TYPE 2 DIABETES MELLITUS WITHOUT COMPLICATION, WITHOUT LONG-TERM CURRENT USE OF INSULIN (HCC): ICD-10-CM

## 2018-09-04 DIAGNOSIS — E78.5 HYPERLIPIDEMIA, UNSPECIFIED HYPERLIPIDEMIA TYPE: ICD-10-CM

## 2018-09-04 RX ORDER — SITAGLIPTIN 100 MG/1
TABLET, FILM COATED ORAL
Qty: 90 TABLET | Refills: 1 | Status: SHIPPED | OUTPATIENT
Start: 2018-09-04 | End: 2019-03-05 | Stop reason: SDUPTHER

## 2018-09-04 RX ORDER — ATORVASTATIN CALCIUM 20 MG/1
TABLET, FILM COATED ORAL
Qty: 90 TABLET | Refills: 1 | Status: SHIPPED | OUTPATIENT
Start: 2018-09-04 | End: 2019-03-05 | Stop reason: SDUPTHER

## 2018-10-02 ENCOUNTER — OFFICE VISIT (OUTPATIENT)
Dept: INTERNAL MEDICINE | Facility: CLINIC | Age: 63
End: 2018-10-02

## 2018-10-02 VITALS
HEART RATE: 68 BPM | DIASTOLIC BLOOD PRESSURE: 62 MMHG | RESPIRATION RATE: 16 BRPM | TEMPERATURE: 97.3 F | SYSTOLIC BLOOD PRESSURE: 124 MMHG | WEIGHT: 166 LBS | BODY MASS INDEX: 31.38 KG/M2

## 2018-10-02 DIAGNOSIS — E78.5 HYPERLIPIDEMIA, UNSPECIFIED HYPERLIPIDEMIA TYPE: ICD-10-CM

## 2018-10-02 DIAGNOSIS — E11.9 TYPE 2 DIABETES MELLITUS WITHOUT COMPLICATION, WITHOUT LONG-TERM CURRENT USE OF INSULIN (HCC): ICD-10-CM

## 2018-10-02 DIAGNOSIS — Z23 NEED FOR INFLUENZA VACCINATION: Primary | ICD-10-CM

## 2018-10-02 DIAGNOSIS — I10 ESSENTIAL HYPERTENSION: ICD-10-CM

## 2018-10-02 DIAGNOSIS — F41.9 ANXIETY: ICD-10-CM

## 2018-10-02 LAB
A/C: NORMAL
BILIRUB BLD-MCNC: NEGATIVE MG/DL
CLARITY, POC: CLEAR
COLOR UR: YELLOW
EXPIRATION DATE: ABNORMAL
EXPIRATION DATE: NORMAL
GLUCOSE UR STRIP-MCNC: ABNORMAL MG/DL
KETONES UR QL: NEGATIVE
LEUKOCYTE EST, POC: NEGATIVE
Lab: ABNORMAL
Lab: NORMAL
NITRITE UR-MCNC: NEGATIVE MG/ML
PH UR: 5 [PH] (ref 5–8)
POC CREATININE URINE: 50
POC MICROALBUMIN URINE: 10
PROT UR STRIP-MCNC: NEGATIVE MG/DL
RBC # UR STRIP: NEGATIVE /UL
SP GR UR: 1.01 (ref 1–1.03)
UROBILINOGEN UR QL: NORMAL

## 2018-10-02 PROCEDURE — 90686 IIV4 VACC NO PRSV 0.5 ML IM: CPT | Performed by: INTERNAL MEDICINE

## 2018-10-02 PROCEDURE — 99214 OFFICE O/P EST MOD 30 MIN: CPT | Performed by: INTERNAL MEDICINE

## 2018-10-02 PROCEDURE — 90471 IMMUNIZATION ADMIN: CPT | Performed by: INTERNAL MEDICINE

## 2018-10-02 PROCEDURE — 82044 UR ALBUMIN SEMIQUANTITATIVE: CPT | Performed by: INTERNAL MEDICINE

## 2018-10-02 PROCEDURE — 81003 URINALYSIS AUTO W/O SCOPE: CPT | Performed by: INTERNAL MEDICINE

## 2018-10-02 PROCEDURE — 36415 COLL VENOUS BLD VENIPUNCTURE: CPT | Performed by: INTERNAL MEDICINE

## 2018-10-02 RX ORDER — DOXYCYCLINE HYCLATE 20 MG
1 TABLET ORAL 2 TIMES DAILY
COMMUNITY
Start: 2018-09-12 | End: 2019-02-05

## 2018-10-03 LAB
ALBUMIN SERPL-MCNC: 4.14 G/DL (ref 3.2–4.8)
ALBUMIN/GLOB SERPL: 1.9 G/DL (ref 1.5–2.5)
ALP SERPL-CCNC: 77 U/L (ref 25–100)
ALT SERPL-CCNC: 25 U/L (ref 7–40)
AST SERPL-CCNC: 26 U/L (ref 0–33)
BILIRUB SERPL-MCNC: 0.7 MG/DL (ref 0.3–1.2)
BUN SERPL-MCNC: 14 MG/DL (ref 9–23)
BUN/CREAT SERPL: 16.9 (ref 7–25)
CALCIUM SERPL-MCNC: 9.3 MG/DL (ref 8.7–10.4)
CHLORIDE SERPL-SCNC: 99 MMOL/L (ref 99–109)
CHOLEST SERPL-MCNC: 112 MG/DL (ref 0–200)
CO2 SERPL-SCNC: 27 MMOL/L (ref 20–31)
CREAT SERPL-MCNC: 0.83 MG/DL (ref 0.6–1.3)
GLOBULIN SER CALC-MCNC: 2.2 GM/DL
GLUCOSE SERPL-MCNC: 308 MG/DL (ref 70–100)
HBA1C MFR BLD: 9.1 % (ref 4.8–5.6)
HDLC SERPL-MCNC: 37 MG/DL (ref 40–60)
LDLC SERPL CALC-MCNC: 48 MG/DL (ref 0–100)
POTASSIUM SERPL-SCNC: 4.2 MMOL/L (ref 3.5–5.5)
PROT SERPL-MCNC: 6.3 G/DL (ref 5.7–8.2)
SODIUM SERPL-SCNC: 138 MMOL/L (ref 132–146)
TRIGL SERPL-MCNC: 134 MG/DL (ref 0–150)
VLDLC SERPL CALC-MCNC: 26.8 MG/DL

## 2018-10-08 RX ORDER — AMLODIPINE BESYLATE 5 MG/1
TABLET ORAL
Qty: 90 TABLET | Refills: 1 | Status: SHIPPED | OUTPATIENT
Start: 2018-10-08 | End: 2019-04-09 | Stop reason: SDUPTHER

## 2018-10-08 RX ORDER — LISINOPRIL 40 MG/1
TABLET ORAL
Qty: 90 TABLET | Refills: 1 | Status: SHIPPED | OUTPATIENT
Start: 2018-10-08 | End: 2019-04-09 | Stop reason: SDUPTHER

## 2018-10-08 RX ORDER — HYDROCHLOROTHIAZIDE 25 MG/1
TABLET ORAL
Qty: 90 TABLET | Refills: 1 | Status: SHIPPED | OUTPATIENT
Start: 2018-10-08 | End: 2019-04-09 | Stop reason: SDUPTHER

## 2018-10-09 NOTE — PROGRESS NOTES
Chief Complaint   Patient presents with   • Follow-up     4 MONTH FOLLOW UP CHRONIC MEDICAL PROBLEMS       History of Present Illness      The patient presents for a follow-up related to Type 2 Diabetes Mellitus and reports that she checks her blood sugars at home. Her sugars are checked every other day. The average sugars are in the 150-200 range. She denies hypoglycemic symptoms. The patient denies polyuria, polydypsia or polyphagia. She reports no symptoms of neuropathy. The patient takes her medication as prescribed. She is not taking insulin. The patient does check her feet daily at home. She denies chest pain, shortness of breath, orthopnea, paroxysmal nocturnal dyspnea, dyspnea on exertion, edema, palpitations or syncope.    The patient presents for a follow-up related to hyperlipidemia. She is following a low fat diet. She reports that she is exercising. She is taking atorvastatin. The patient is taking her medication as prescribed. She reports no medication side effects, including muscle cramps, abdominal pain, headaches or weakness.    The patient presents for follow-up of anxiety. She denies currently having anxiety symptoms. She is not having panic attacks. Her energy level is good. She denies agorophobia. She sleeps well. She is currently taking a medication. She states that her current anxiety symptoms are stable. The current medication regimen consists of Paxil. The patient denies having medication side effects including nausea, headaches, anxiety, increased depression, anorgasmia or fatigue.    The patient presents for a follow-up related to hypertension. The patient reports that she has had no headaches or blurred vision. She states that she is taking her medication as prescribed. She is not having medication side effects.    Review of Systems    GENERAL/CONSTITUTIONAL- Denies Unexplained Weight Loss, Fever, Chills, Sweats, Weakness or Malaise.    HEENT- Denies Eye Pain, Eye Drainage, Nasal  Discharge, Sore Throat, Ear Pain, Ear Drainage, Decreased Vision, Sinus Pain, Nasal Congestion, Decreased Hearing, Visual Disturbance, Diplopia or Tinnitus.    CARDIOVASCULAR- Denies Claudication.    GASTROINTESTINAL- Denies Abdominal Pain, Nausea, Vomiting, Diarrhea, Blood per Rectum, Constipation or Heartburn.    PULMONARY- Denies Wheezing, Sputum Production, Cough, Hemoptysis or Pleuritic Chest Pain.    Medications      Current Outpatient Prescriptions:   •  aspirin 81 MG tablet, Take  by mouth daily., Disp: , Rfl:   •  atenolol (TENORMIN) 50 MG tablet, TAKE 2 TABLETS DAILY (for heart), Disp: 180 tablet, Rfl: 1  •  atorvastatin (LIPITOR) 20 MG tablet, TAKE 1 TABLET DAILY, Disp: 90 tablet, Rfl: 1  •  CILOXAN 0.3 % ophthalmic ointment, , Disp: , Rfl:   •  dorzolamide-timolol (COSOPT) 22.3-6.8 MG/ML ophthalmic solution, Apply  to eye 2 (two) times a day., Disp: , Rfl:   •  doxycycline (PERIOSTAT) 20 MG tablet, Take 1 tablet by mouth 2 (Two) Times a Day., Disp: , Rfl:   •  glipiZIDE (GLUCOTROL) 10 MG tablet, TAKE 1 TABLET TWICE DAILY FOR DIABETES, Disp: 180 tablet, Rfl: 1  •  glucose blood test strip, Use daily to check blood glucose for dx E11.9, Disp: 50 each, Rfl: 10  •  JANUVIA 100 MG tablet, TAKE 1 TABLET ONCE DAILY FOR DIABETES, Disp: 90 tablet, Rfl: 1  •  Lancets misc, USE TO TEST ONCE DAILY AND AS NEEDED, Disp: 100 each, Rfl: 3  •  latanoprost (XALATAN) 0.005 % ophthalmic solution, , Disp: , Rfl:   •  metFORMIN (GLUCOPHAGE) 1000 MG tablet, TAKE 1 TABLET TWICE A DAY (WITH MEALS), Disp: 180 tablet, Rfl: 0  •  metroNIDAZOLE (METROGEL) 1 % gel, Apply  topically Daily., Disp: 60 g, Rfl: 3  •  Multiple Vitamins-Minerals (ICAPS AREDS FORMULA PO), Take  by mouth 2 (two) times a day., Disp: , Rfl:   •  PARoxetine (PAXIL) 20 MG tablet, TAKE (1) TABLET DAILY IN THE MORNING., Disp: 90 tablet, Rfl: 1  •  amLODIPine (NORVASC) 5 MG tablet, TAKE (1) TABLET DAILY FOR HEART., Disp: 90 tablet, Rfl: 1  •  hydrochlorothiazide  (HYDRODIURIL) 25 MG tablet, TAKE (1) TABLET DAILY FOR FLUID., Disp: 90 tablet, Rfl: 1  •  lisinopril (PRINIVIL,ZESTRIL) 40 MG tablet, TAKE (1) TABLET DAILY FOR HIGH BLOOD PRESSURE., Disp: 90 tablet, Rfl: 1     Allergies    No Known Allergies    Problem List    Patient Active Problem List   Diagnosis   • Abnormal mammogram   • Anemia   • Depression   • Diabetes mellitus (CMS/HCC)   • Hyperlipidemia   • Hypertension   • Anxiety       Medications, Allergies, Problems List and Past History were reviewed and updated.    Physical Examination    /62 (BP Location: Right arm, Patient Position: Sitting, Cuff Size: Adult)   Pulse 68   Temp 97.3 °F (36.3 °C) (Temporal Artery )   Resp 16   Wt 75.3 kg (166 lb)   LMP  (LMP Unknown)   Breastfeeding? No   BMI 31.38 kg/m²     HEENT: Head- Normocephalic Atraumatic. Facies- Within normal limits. Pinnas- Normal texture and shape bilaterally. Canals- Normal bilaterally. TMs- Normal bilaterally. Nares- Patent bilaterally. Nasal Septum- is normal. There is no tenderness to palpation over the frontal or maxillary sinuses. Lids- Normal bilaterally. Conjunctiva- Clear bilaterally. Sclera- Anicteric bilaterally. Oropharynx- Moist with no lesions. Tonsils- No enlargement, erythema or exudate.    Neck: Thyroid- non enlarged, symmetric and has no nodules. No bruits are detected. ROM- Normal Range of Motion with no rigidity.    Lungs: Auscultation- Clear to auscultation bilaterally. There are no retractions, clubbing or cyanosis. The Expiratory to Inspiratory ratio is equal.    Cardiovascular: There are no carotid bruits. Heart- Normal Rate with Regular rhythm and no murmurs. There are no gallops. There are no rubs. In the lower extremities there is no edema. The upper extremities do not have edema.    Abdomen: Soft, benign, non-tender with no masses, hernias, organomegaly or scars.    Impression and Assessment    Type 2 Diabetes Mellitus without complication without insulin  usage.    Hyperlipidemia.    Anxiety Disorder Unspecified.    Essential Hypertension.    Plan    Hyperlipidemia Plan: The patient was instructed to exercise daily, eat a low fat diet and continue her medications.    Essential Hypertension Plan: The current plan was continued.    Type 2 Diabetes Mellitus without complication without insulin usage Plan: The current plan was continued.    Anxiety Disorder Unspecified Plan: The current plan was continued.    Zeinab was seen today for follow-up.    Diagnoses and all orders for this visit:    Need for influenza vaccination  -     Fluarix/Fluzone/Afluria Quad>6 Months    Type 2 diabetes mellitus without complication, without long-term current use of insulin (CMS/[facility])  -     Ambulatory Referral to Cardiac Rehab  -     Comprehensive Metabolic Panel  -     Hemoglobin A1c  -     POC Microalbumin    Hyperlipidemia, unspecified hyperlipidemia type  -     Ambulatory Referral to Cardiac Rehab  -     Comprehensive Metabolic Panel  -     Lipid Panel    Essential hypertension  -     Ambulatory Referral to Cardiac Rehab  -     Comprehensive Metabolic Panel  -     POC Urinalysis Dipstick, Automated    Anxiety          Return to Office    The patient was instructed to return for follow-up in 4 months.    The patient was instructed to return sooner if the condition changes, worsens, or doesn't resolve.

## 2018-11-05 RX ORDER — ATENOLOL 100 MG/1
TABLET ORAL
Qty: 90 TABLET | Refills: 1 | Status: SHIPPED | OUTPATIENT
Start: 2018-11-05 | End: 2019-05-14 | Stop reason: SDUPTHER

## 2018-12-07 DIAGNOSIS — F41.9 ANXIETY: ICD-10-CM

## 2018-12-07 DIAGNOSIS — F32.89 OTHER DEPRESSION: ICD-10-CM

## 2018-12-07 RX ORDER — GLIPIZIDE 10 MG/1
TABLET ORAL
Qty: 180 TABLET | Refills: 0 | Status: SHIPPED | OUTPATIENT
Start: 2018-12-07 | End: 2019-03-08 | Stop reason: SDUPTHER

## 2018-12-07 RX ORDER — PAROXETINE HYDROCHLORIDE 20 MG/1
TABLET, FILM COATED ORAL
Qty: 90 TABLET | Refills: 0 | Status: SHIPPED | OUTPATIENT
Start: 2018-12-07 | End: 2019-03-11 | Stop reason: SDUPTHER

## 2019-02-05 ENCOUNTER — OFFICE VISIT (OUTPATIENT)
Dept: INTERNAL MEDICINE | Facility: CLINIC | Age: 64
End: 2019-02-05

## 2019-02-05 VITALS
WEIGHT: 167 LBS | BODY MASS INDEX: 31.57 KG/M2 | RESPIRATION RATE: 18 BRPM | DIASTOLIC BLOOD PRESSURE: 72 MMHG | HEART RATE: 68 BPM | TEMPERATURE: 98.3 F | SYSTOLIC BLOOD PRESSURE: 134 MMHG

## 2019-02-05 DIAGNOSIS — E11.9 TYPE 2 DIABETES MELLITUS WITHOUT COMPLICATION, WITHOUT LONG-TERM CURRENT USE OF INSULIN (HCC): Primary | ICD-10-CM

## 2019-02-05 DIAGNOSIS — I10 ESSENTIAL HYPERTENSION: ICD-10-CM

## 2019-02-05 DIAGNOSIS — E78.5 HYPERLIPIDEMIA, UNSPECIFIED HYPERLIPIDEMIA TYPE: ICD-10-CM

## 2019-02-05 DIAGNOSIS — F32.89 OTHER DEPRESSION: ICD-10-CM

## 2019-02-05 DIAGNOSIS — R82.998 LEUKOCYTES IN URINE: ICD-10-CM

## 2019-02-05 LAB
A/C: NORMAL
BILIRUB BLD-MCNC: NEGATIVE MG/DL
CLARITY, POC: ABNORMAL
COLOR UR: YELLOW
EXPIRATION DATE: ABNORMAL
EXPIRATION DATE: NORMAL
GLUCOSE UR STRIP-MCNC: ABNORMAL MG/DL
KETONES UR QL: NEGATIVE
LEUKOCYTE EST, POC: ABNORMAL
Lab: ABNORMAL
Lab: NORMAL
NITRITE UR-MCNC: NEGATIVE MG/ML
PH UR: 5 [PH] (ref 5–8)
POC CREATININE URINE: 100
POC MICROALBUMIN URINE: 10
PROT UR STRIP-MCNC: NEGATIVE MG/DL
RBC # UR STRIP: NEGATIVE /UL
SP GR UR: 1.02 (ref 1–1.03)
UROBILINOGEN UR QL: NORMAL

## 2019-02-05 PROCEDURE — 82044 UR ALBUMIN SEMIQUANTITATIVE: CPT | Performed by: INTERNAL MEDICINE

## 2019-02-05 PROCEDURE — 99214 OFFICE O/P EST MOD 30 MIN: CPT | Performed by: INTERNAL MEDICINE

## 2019-02-05 PROCEDURE — 36415 COLL VENOUS BLD VENIPUNCTURE: CPT | Performed by: INTERNAL MEDICINE

## 2019-02-05 PROCEDURE — 81003 URINALYSIS AUTO W/O SCOPE: CPT | Performed by: INTERNAL MEDICINE

## 2019-02-05 NOTE — PROGRESS NOTES
Chief Complaint   Patient presents with   • Follow-up     4 MONTH FOLLOW UP CHRONIC MEDICAL PROBLEMS       History of Present Illness    The patient presents for a follow-up related to Type 2 Diabetes Mellitus and reports that she checks her blood sugars at home. Her sugars are checked daily. The average sugars are in the 200-250 range. She denies hypoglycemic symptoms. The patient denies polyuria, polydypsia or polyphagia. She reports no symptoms of neuropathy. The patient takes her medication as prescribed. She is not taking insulin. The patient does check her feet daily at home. She denies chest pain, shortness of breath, orthopnea, paroxysmal nocturnal dyspnea, dyspnea on exertion, edema, palpitations or syncope.    The patient presents for a follow-up related to hyperlipidemia. She is following a low fat diet. She reports that she is exercising. She is taking atorvastatin. The patient is taking her medication as prescribed. She reports no medication side effects, including muscle cramps, abdominal pain, headaches or weakness.    The patient presents for a follow-up related to hypertension. The patient reports that she has had no headaches or blurred vision. She states that she is taking her medication as prescribed. She is not having medication side effects.    The patient presents for follow-up of depression. She denies currently having depression symptoms. She denies suicidal ideation. Her energy level is good. She denies agorophobia. She sleeps well. She is not tearful. She states that her current depression symptoms are stable. She is currently taking a medication. The current medication regimen consists of paroxetine. The patient denies having medication side effects including nausea, headaches, anxiety, increased depression, anorgasmia or fatigue.    Review of Systems    CONSTITUTIONAL- Denies Unexplained Weight Loss, Fever, Chills, Sweats, Weakness or Malaise.    HENT- Denies Nasal Discharge, Sore  Throat, Ear Pain, Ear Drainage, Sinus Pain, Nasal Congestion, Decreased Hearing or Tinnitus.    GASTROINTESTINAL- Denies Abdominal Pain, Nausea, Vomiting, Diarrhea, Blood per Rectum, Constipation or Heartburn.    PULMONARY- Denies Wheezing, Sputum Production, Cough, Hemoptysis or Pleuritic Chest Pain.    CARDIOVASCULAR- Denies Claudication or Irregular Heart Beat.    Medications      Current Outpatient Medications:   •  amLODIPine (NORVASC) 5 MG tablet, TAKE (1) TABLET DAILY FOR HEART., Disp: 90 tablet, Rfl: 1  •  aspirin 81 MG tablet, Take  by mouth daily., Disp: , Rfl:   •  atenolol (TENORMIN) 100 MG tablet, TAKE (1) TABLET DAILY FOR HEART., Disp: 90 tablet, Rfl: 1  •  atorvastatin (LIPITOR) 20 MG tablet, TAKE 1 TABLET DAILY, Disp: 90 tablet, Rfl: 1  •  CILOXAN 0.3 % ophthalmic ointment, , Disp: , Rfl:   •  dorzolamide-timolol (COSOPT) 22.3-6.8 MG/ML ophthalmic solution, Apply  to eye 2 (two) times a day., Disp: , Rfl:   •  glipiZIDE (GLUCOTROL) 10 MG tablet, TAKE 1 TABLET TWICE DAILY FOR DIABETES, Disp: 180 tablet, Rfl: 0  •  glucose blood test strip, Use daily to check blood glucose for dx E11.9, Disp: 50 each, Rfl: 10  •  hydrochlorothiazide (HYDRODIURIL) 25 MG tablet, TAKE (1) TABLET DAILY FOR FLUID., Disp: 90 tablet, Rfl: 1  •  JANUVIA 100 MG tablet, TAKE 1 TABLET ONCE DAILY FOR DIABETES, Disp: 90 tablet, Rfl: 1  •  Lancets misc, USE TO TEST ONCE DAILY AND AS NEEDED, Disp: 100 each, Rfl: 3  •  latanoprost (XALATAN) 0.005 % ophthalmic solution, , Disp: , Rfl:   •  lisinopril (PRINIVIL,ZESTRIL) 40 MG tablet, TAKE (1) TABLET DAILY FOR HIGH BLOOD PRESSURE., Disp: 90 tablet, Rfl: 1  •  metFORMIN (GLUCOPHAGE) 1000 MG tablet, TAKE 1 TABLET TWICE A DAY (WITH MEALS), Disp: 180 tablet, Rfl: 1  •  metroNIDAZOLE (METROGEL) 1 % gel, Apply  topically Daily., Disp: 60 g, Rfl: 3  •  Multiple Vitamins-Minerals (ICAPS AREDS FORMULA PO), Take  by mouth 2 (two) times a day., Disp: , Rfl:   •  PARoxetine (PAXIL) 20 MG tablet,  TAKE (1) TABLET DAILY IN THE MORNING., Disp: 90 tablet, Rfl: 0     Allergies    No Known Allergies    Problem List    Patient Active Problem List   Diagnosis   • Abnormal mammogram   • Anemia   • Depression   • Diabetes mellitus (CMS/[facility])   • Hyperlipidemia   • Hypertension   • Anxiety       Medications, Allergies, Problems List and Past History were reviewed and updated.    Physical Examination    /72 (BP Location: Right arm, Patient Position: Sitting, Cuff Size: Adult)   Pulse 68   Temp 98.3 °F (36.8 °C) (Temporal)   Resp 18   Wt 75.8 kg (167 lb)   LMP  (LMP Unknown)   Breastfeeding? No   BMI 31.57 kg/m²     HEENT: Head- Normocephalic Atraumatic. Facies- Within normal limits. Pinnas- Normal texture and shape bilaterally. Canals- Normal bilaterally. TMs- Normal bilaterally. Nares- Patent bilaterally. Nasal Septum- is normal. There is no tenderness to palpation over the frontal or maxillary sinuses. Lids- Normal bilaterally. Conjunctiva- Clear bilaterally. Sclera- Anicteric bilaterally. Oropharynx- Moist with no lesions. Tonsils- No enlargement, erythema or exudate.    Neck: Thyroid- non enlarged, symmetric and has no nodules. No bruits are detected. ROM- Normal Range of Motion with no rigidity.    Lungs: Auscultation- Clear to auscultation bilaterally. There are no retractions, clubbing or cyanosis. The Expiratory to Inspiratory ratio is equal.    Cardiovascular: There are no carotid bruits. Heart- Normal Rate with Regular rhythm and no murmurs. There are no gallops. There are no rubs. In the lower extremities there is no edema. The upper extremities do not have edema.    Abdomen: Soft, benign, non-tender with no masses, hernias, organomegaly or scars.    Impression and Assessment    Type 2 Diabetes Mellitus without complication without insulin usage.    Hyperlipidemia.    Essential Hypertension.    Major Depressive Disorder Single Episode Unspecified.    Plan    Hyperlipidemia Plan: The patient was  instructed to exercise daily, eat a low fat diet and continue her medications.    Essential Hypertension Plan: The current plan was continued.    Type 2 Diabetes Mellitus without complication without insulin usage Plan: The patient was instructed to continue the current medications. If A1c isn't improved, will need endocrinology appointment.    Major Depressive Disorder Single Episode Unspecified Plan: The current plan was continued.    Zeinab was seen today for follow-up.    Diagnoses and all orders for this visit:    Type 2 diabetes mellitus without complication, without long-term current use of insulin (CMS/[facility])  -     Comprehensive Metabolic Panel  -     Hemoglobin A1c  -     POC Microalbumin    Hyperlipidemia, unspecified hyperlipidemia type  -     Comprehensive Metabolic Panel  -     Lipid Panel    Essential hypertension  -     Comprehensive Metabolic Panel  -     POC Urinalysis Dipstick, Automated    Other depression        Return to Office    The patient was instructed to return for follow-up in 4 months. Next Visit: Physical.    The patient was instructed to return sooner if the condition changes, worsens, or does not resolve.

## 2019-02-06 LAB
ALBUMIN SERPL-MCNC: 4.09 G/DL (ref 3.2–4.8)
ALBUMIN/GLOB SERPL: 2.1 G/DL (ref 1.5–2.5)
ALP SERPL-CCNC: 78 U/L (ref 25–100)
ALT SERPL-CCNC: 24 U/L (ref 7–40)
AST SERPL-CCNC: 36 U/L (ref 0–33)
BILIRUB SERPL-MCNC: 0.6 MG/DL (ref 0.3–1.2)
BUN SERPL-MCNC: 13 MG/DL (ref 9–23)
BUN/CREAT SERPL: 18.6 (ref 7–25)
CALCIUM SERPL-MCNC: 9.2 MG/DL (ref 8.7–10.4)
CHLORIDE SERPL-SCNC: 102 MMOL/L (ref 99–109)
CHOLEST SERPL-MCNC: 114 MG/DL (ref 0–200)
CO2 SERPL-SCNC: 29 MMOL/L (ref 20–31)
CREAT SERPL-MCNC: 0.7 MG/DL (ref 0.6–1.3)
GLOBULIN SER CALC-MCNC: 1.9 GM/DL
GLUCOSE SERPL-MCNC: 184 MG/DL (ref 70–100)
HBA1C MFR BLD: 9.3 % (ref 4.8–5.6)
HDLC SERPL-MCNC: 34 MG/DL (ref 40–60)
LDLC SERPL CALC-MCNC: 49 MG/DL (ref 0–100)
POTASSIUM SERPL-SCNC: 4 MMOL/L (ref 3.5–5.5)
PROT SERPL-MCNC: 6 G/DL (ref 5.7–8.2)
SODIUM SERPL-SCNC: 140 MMOL/L (ref 132–146)
TRIGL SERPL-MCNC: 157 MG/DL (ref 0–150)
VLDLC SERPL CALC-MCNC: 31.4 MG/DL

## 2019-02-09 LAB
BACTERIA UR CULT: NORMAL
BACTERIA UR CULT: NORMAL

## 2019-03-05 DIAGNOSIS — E11.9 TYPE 2 DIABETES MELLITUS WITHOUT COMPLICATION, WITHOUT LONG-TERM CURRENT USE OF INSULIN (HCC): ICD-10-CM

## 2019-03-05 DIAGNOSIS — E78.5 HYPERLIPIDEMIA, UNSPECIFIED HYPERLIPIDEMIA TYPE: ICD-10-CM

## 2019-03-05 RX ORDER — ATORVASTATIN CALCIUM 20 MG/1
TABLET, FILM COATED ORAL
Qty: 90 TABLET | Refills: 1 | Status: SHIPPED | OUTPATIENT
Start: 2019-03-05 | End: 2019-09-03 | Stop reason: SDUPTHER

## 2019-03-05 RX ORDER — SITAGLIPTIN 100 MG/1
TABLET, FILM COATED ORAL
Qty: 90 TABLET | Refills: 1 | Status: SHIPPED | OUTPATIENT
Start: 2019-03-05 | End: 2019-09-03 | Stop reason: SDUPTHER

## 2019-03-08 RX ORDER — GLIPIZIDE 10 MG/1
TABLET ORAL
Qty: 180 TABLET | Refills: 0 | Status: SHIPPED | OUTPATIENT
Start: 2019-03-08 | End: 2019-06-07 | Stop reason: SDUPTHER

## 2019-03-08 RX ORDER — GLIPIZIDE 10 MG/1
TABLET ORAL
Qty: 180 TABLET | Refills: 0 | Status: SHIPPED | OUTPATIENT
Start: 2019-03-08 | End: 2019-03-08 | Stop reason: SDUPTHER

## 2019-03-11 DIAGNOSIS — F32.89 OTHER DEPRESSION: ICD-10-CM

## 2019-03-11 DIAGNOSIS — F41.9 ANXIETY: ICD-10-CM

## 2019-03-11 RX ORDER — PAROXETINE HYDROCHLORIDE 20 MG/1
TABLET, FILM COATED ORAL
Qty: 90 TABLET | Refills: 0 | Status: SHIPPED | OUTPATIENT
Start: 2019-03-11 | End: 2019-06-07 | Stop reason: SDUPTHER

## 2019-04-09 RX ORDER — AMLODIPINE BESYLATE 5 MG/1
TABLET ORAL
Qty: 90 TABLET | Refills: 0 | Status: SHIPPED | OUTPATIENT
Start: 2019-04-09 | End: 2019-07-08 | Stop reason: SDUPTHER

## 2019-04-09 RX ORDER — LISINOPRIL 40 MG/1
TABLET ORAL
Qty: 90 TABLET | Refills: 0 | Status: SHIPPED | OUTPATIENT
Start: 2019-04-09 | End: 2019-07-08 | Stop reason: SDUPTHER

## 2019-04-09 RX ORDER — HYDROCHLOROTHIAZIDE 25 MG/1
TABLET ORAL
Qty: 90 TABLET | Refills: 0 | Status: SHIPPED | OUTPATIENT
Start: 2019-04-09 | End: 2019-07-08 | Stop reason: SDUPTHER

## 2019-05-14 RX ORDER — ATENOLOL 100 MG/1
TABLET ORAL
Qty: 90 TABLET | Refills: 0 | Status: SHIPPED | OUTPATIENT
Start: 2019-05-14 | End: 2019-08-14 | Stop reason: SDUPTHER

## 2019-06-06 ENCOUNTER — OFFICE VISIT (OUTPATIENT)
Dept: INTERNAL MEDICINE | Facility: CLINIC | Age: 64
End: 2019-06-06

## 2019-06-06 VITALS
BODY MASS INDEX: 30.58 KG/M2 | WEIGHT: 162 LBS | RESPIRATION RATE: 18 BRPM | SYSTOLIC BLOOD PRESSURE: 122 MMHG | HEIGHT: 61 IN | TEMPERATURE: 98 F | DIASTOLIC BLOOD PRESSURE: 60 MMHG | HEART RATE: 68 BPM

## 2019-06-06 DIAGNOSIS — E11.9 TYPE 2 DIABETES MELLITUS WITHOUT COMPLICATION, WITHOUT LONG-TERM CURRENT USE OF INSULIN (HCC): ICD-10-CM

## 2019-06-06 DIAGNOSIS — F33.41 RECURRENT MAJOR DEPRESSIVE DISORDER, IN PARTIAL REMISSION (HCC): ICD-10-CM

## 2019-06-06 DIAGNOSIS — Z00.00 PHYSICAL EXAM: Primary | ICD-10-CM

## 2019-06-06 DIAGNOSIS — H91.93 DECREASED HEARING OF BOTH EARS: ICD-10-CM

## 2019-06-06 DIAGNOSIS — E78.5 HYPERLIPIDEMIA, UNSPECIFIED HYPERLIPIDEMIA TYPE: ICD-10-CM

## 2019-06-06 DIAGNOSIS — I10 ESSENTIAL HYPERTENSION: ICD-10-CM

## 2019-06-06 LAB
BILIRUB BLD-MCNC: NEGATIVE MG/DL
CLARITY, POC: CLEAR
COLOR UR: YELLOW
EXPIRATION DATE: ABNORMAL
EXPIRATION DATE: NORMAL
GLUCOSE UR STRIP-MCNC: ABNORMAL MG/DL
KETONES UR QL: NEGATIVE
LEUKOCYTE EST, POC: ABNORMAL
Lab: ABNORMAL
Lab: NORMAL
NITRITE UR-MCNC: NEGATIVE MG/ML
PH UR: 5 [PH] (ref 5–8)
POC CREATININE URINE: 200
POC MICROALBUMIN URINE: 80
PROT UR STRIP-MCNC: NEGATIVE MG/DL
RBC # UR STRIP: NEGATIVE /UL
SP GR UR: 1.02 (ref 1–1.03)
UROBILINOGEN UR QL: NORMAL

## 2019-06-06 PROCEDURE — 36415 COLL VENOUS BLD VENIPUNCTURE: CPT | Performed by: INTERNAL MEDICINE

## 2019-06-06 PROCEDURE — 82044 UR ALBUMIN SEMIQUANTITATIVE: CPT | Performed by: INTERNAL MEDICINE

## 2019-06-06 PROCEDURE — 99396 PREV VISIT EST AGE 40-64: CPT | Performed by: INTERNAL MEDICINE

## 2019-06-06 PROCEDURE — 81003 URINALYSIS AUTO W/O SCOPE: CPT | Performed by: INTERNAL MEDICINE

## 2019-06-06 NOTE — PROGRESS NOTES
Chief Complaint   Patient presents with   • Annual Exam       History of Present Illness      The patient presents for an established patient physical examination and health maintenance visit.    The patient presents for a follow-up related to Type 2 Diabetes Mellitus and reports that she checks her blood sugars at home. Her sugars are checked daily. The average sugars are in the 200-250 range. She denies hypoglycemic symptoms. The patient denies polyuria, polydypsia or polyphagia. She reports no symptoms of neuropathy. The patient takes her medication as prescribed. She is not taking insulin. The patient does check her feet daily at home. She denies chest pain, shortness of breath, orthopnea, paroxysmal nocturnal dyspnea, dyspnea on exertion, edema, palpitations or syncope.    The patient presents for a follow-up related to hyperlipidemia. She is following a low fat diet. She reports that she is not exercising. She is taking atorvastatin. The patient is taking her medication as prescribed. She reports no medication side effects, including muscle cramps, abdominal pain, headaches or weakness.    The patient presents for a follow-up related to hypertension. The patient reports that she has had no headaches or blurred vision. She states that she is taking her medication as prescribed. She is not having medication side effects.    The patient presents for follow-up of depression. She denies currently having depression symptoms. She denies suicidal ideation. Her energy level is good. She denies agorophobia. She sleeps well. She is not tearful. She states that her current depression symptoms are stable. She is currently taking a medication. The current medication regimen consists of Paxil. The patient denies having medication side effects including nausea, headaches, anxiety, increased depression, anorgasmia or fatigue.    Review of Systems    CONSTITUTIONAL- Denies Unexplained Weight Loss, Fever, Chills, Sweats,  Weakness or Malaise.    NECK- Denies Decreased Range of Motion, Stiffness, Thyroid Nodules, Enlarged Lymph Nodes or Goiter.    EYES- Denies Eye Pain, Eye Drainage, Eye Redness, Eye Discharge, Decreased Vision, Visual Disturbance, Diplopia, Visual Loss or Swollen Eyelid.    HENT- Reports: Decreased Hearing. Denies: Nasal Discharge, Sore Throat, Ear Pain, Ear Drainage, Sinus Pain, Nasal Congestion or Tinnitus.    PULMONARY- Denies Wheezing, Sputum Production, Cough, Hemoptysis or Pleuritic Chest Pain.    GASTROINTESTINAL- Denies Abdominal Pain, Nausea, Vomiting, Diarrhea, Blood per Rectum, Constipation or Heartburn.    CARDIOVASCULAR- Denies Claudication or Irregular Heart Beat.    GENITOURINARY- Denies Dysuria, Hematuria, Urinary Frequency, Urinary Leakage, Pelvic Pain, Vaginal Prolapse, Vaginal Discharge, Dyspareunia or Abnormal Menses.    ENDOCRINE- Denies Cold Intolerance, Depression, Hair Loss, Heat Intolerance, Memory Loss or Weight Gain.    NEUROLOGIC- Denies Seizures, Confusion or Excessive Daytime Sleepiness.    MUSCULOSKELETAL- Denies Joint Pain, Joint Stiffness, Decreased Range of Motion, Joint Swelling or Erythema of Joints.    INTEGUMENTARY- Denies Rash, Lumps, Sores, Itching, Dryness, Color Change, Changes in Hair, Brittle Nails, Discolored Nails, Thickened Nails, Growths or Alopecia.    Medications      Current Outpatient Medications:   •  amLODIPine (NORVASC) 5 MG tablet, TAKE (1) TABLET DAILY FOR HEART., Disp: 90 tablet, Rfl: 0  •  aspirin 81 MG tablet, Take  by mouth daily., Disp: , Rfl:   •  atenolol (TENORMIN) 100 MG tablet, TAKE 1 TABLET DAILY, Disp: 90 tablet, Rfl: 0  •  atorvastatin (LIPITOR) 20 MG tablet, TAKE 1 TABLET ONCE DAILY FOR CHOLESTEROL, Disp: 90 tablet, Rfl: 1  •  CILOXAN 0.3 % ophthalmic ointment, , Disp: , Rfl:   •  dorzolamide-timolol (COSOPT) 22.3-6.8 MG/ML ophthalmic solution, Apply  to eye 2 (two) times a day., Disp: , Rfl:   •  glipiZIDE (GLUCOTROL) 10 MG tablet, TAKE 1 TABLET  "TWICE DAILY FOR DIABETES, Disp: 180 tablet, Rfl: 0  •  glucose blood test strip, Use daily to check blood glucose for dx E11.9, Disp: 50 each, Rfl: 10  •  hydrochlorothiazide (HYDRODIURIL) 25 MG tablet, TAKE (1) TABLET DAILY FOR FLUID., Disp: 90 tablet, Rfl: 0  •  JANUVIA 100 MG tablet, TAKE 1 TABLET ONCE DAILY FOR DIABETES, Disp: 90 tablet, Rfl: 1  •  Lancets misc, USE TO TEST ONCE DAILY AND AS NEEDED, Disp: 100 each, Rfl: 3  •  latanoprost (XALATAN) 0.005 % ophthalmic solution, , Disp: , Rfl:   •  lisinopril (PRINIVIL,ZESTRIL) 40 MG tablet, TAKE (1) TABLET DAILY FOR HIGH BLOOD PRESSURE., Disp: 90 tablet, Rfl: 0  •  metFORMIN (GLUCOPHAGE) 1000 MG tablet, TAKE 1 TABLET TWICE A DAY (WITH MEALS), Disp: 180 tablet, Rfl: 1  •  metroNIDAZOLE (METROGEL) 1 % gel, Apply  topically Daily., Disp: 60 g, Rfl: 3  •  Multiple Vitamins-Minerals (ICAPS AREDS FORMULA PO), Take  by mouth 2 (two) times a day., Disp: , Rfl:   •  PARoxetine (PAXIL) 20 MG tablet, TAKE (1) TABLET DAILY IN THE MORNING., Disp: 90 tablet, Rfl: 0     Allergies    No Known Allergies    Problem List    Patient Active Problem List   Diagnosis   • Abnormal mammogram   • Anemia   • Depression   • Diabetes mellitus (CMS/[facility])   • Hyperlipidemia   • Hypertension   • Anxiety       Medications, Allergies, Problems List and Past History were reviewed and updated.    Physical Examination    /60 (BP Location: Right arm, Patient Position: Sitting, Cuff Size: Adult)   Pulse 68   Temp 98 °F (36.7 °C) (Temporal)   Resp 18   Ht 154.9 cm (61\")   Wt 73.5 kg (162 lb)   LMP  (LMP Unknown) Comment: scheduled for pap JEREMIE Watson in [place]  Breastfeeding? No   BMI 30.61 kg/m²     HEENT: Head- Normocephalic Atraumatic. Facies- Within normal limits. Pinnas- Normal texture and shape bilaterally. Canals- Normal bilaterally. TMs- Normal bilaterally. Nares- Patent bilaterally. Nasal Septum- is normal. There is no tenderness to palpation over the frontal or maxillary " sinuses. Lids- Normal bilaterally. Conjunctiva- Clear bilaterally. Sclera- Anicteric bilaterally. Oropharynx- Moist with no lesions. Tonsils- No enlargement, erythema or exudate.    Neck: Thyroid- non enlarged, symmetric and has no nodules. No bruits are detected. ROM- Normal Range of Motion with no rigidity.    Lungs: Auscultation- Clear to auscultation bilaterally. There are no retractions, clubbing or cyanosis. The Expiratory to Inspiratory ratio is equal.    Lymph Nodes: Cervical- no enlarged lymph nodes noted. Clavicular- Deferred. Axillary- Deferred. Inguinal- Deferred.    Cardiovascular: There are no carotid bruits. Heart- Normal Rate with Regular rhythm and no murmurs. There are no gallops. There are no rubs. In the lower extremities there is no edema. The upper extremities do not have edema.    Abdomen: Soft, benign, non-tender with no masses, hernias, organomegaly or scars.    Breast: Examination reveals that the right breast has normal contours with no masses, discharge, skin changes, or lumps and the left breast has skin changes. There are no scars noted.    Feet: The feet are symmetric with normal blanca landmarks. There is no tenderness to palpation bilaterally. The feet have normal posterior tibial and dorsalis pedis pulses and normal capillary refill bilaterally. The monofilament examination is normal bilaterally.       The arches are normal bilaterally. There are no skin or nail lesions present. There are no ingrown nails. There are no bunions noted.    Dermatologic: The patient has no worrisome or suspicious skin lesions noted.    Impression and Assessment    Normal Physical Examination.    Type 2 Diabetes Mellitus.    Hyperlipidemia.    Essential Hypertension.    Major Depressive Disorder Single Episode Unspecified. (F33.40)    Hearing Loss.    Plan    Hyperlipidemia Plan: The patient was instructed to exercise daily, eat a low fat diet and continue her medications.    Essential Hypertension Plan:  The current plan was continued.    Type 2 Diabetes Mellitus Plan: She was referred to endocrinology. The patient was instructed to continue the current medications.    Hearing Loss Plan: She will follow-up with audiology.    Major Depressive Disorder Single Episode Unspecified Plan: The current plan was continued.    Counseling was provided regarding: Adequate Aerobic Exercise, Dental Visits, Flossing Teeth and Heart Healthy Diet.    No screening tests are indicated at this time.       Immunizations Ordered and Administered: None.      Return to Office    The patient was instructed to return for follow-up in 1 year.    The patient was instructed to return sooner if the condition changes, worsens, or does not resolve.

## 2019-06-07 DIAGNOSIS — F41.9 ANXIETY: ICD-10-CM

## 2019-06-07 DIAGNOSIS — F32.89 OTHER DEPRESSION: ICD-10-CM

## 2019-06-07 LAB
ALBUMIN SERPL-MCNC: 3.8 G/DL (ref 3.5–5.2)
ALBUMIN/GLOB SERPL: 1.4 G/DL
ALP SERPL-CCNC: 82 U/L (ref 39–117)
ALT SERPL-CCNC: 19 U/L (ref 1–33)
AST SERPL-CCNC: 25 U/L (ref 1–32)
BILIRUB SERPL-MCNC: 0.6 MG/DL (ref 0.2–1.2)
BUN SERPL-MCNC: 11 MG/DL (ref 8–23)
BUN/CREAT SERPL: 15.3 (ref 7–25)
CALCIUM SERPL-MCNC: 9.5 MG/DL (ref 8.6–10.5)
CHLORIDE SERPL-SCNC: 99 MMOL/L (ref 98–107)
CHOLEST SERPL-MCNC: 113 MG/DL (ref 0–200)
CO2 SERPL-SCNC: 28 MMOL/L (ref 22–29)
CREAT SERPL-MCNC: 0.72 MG/DL (ref 0.57–1)
GLOBULIN SER CALC-MCNC: 2.7 GM/DL
GLUCOSE SERPL-MCNC: 309 MG/DL (ref 65–99)
HBA1C MFR BLD: 9.5 % (ref 4.8–5.6)
HDLC SERPL-MCNC: 38 MG/DL (ref 40–60)
LDLC SERPL CALC-MCNC: 34 MG/DL (ref 0–100)
POTASSIUM SERPL-SCNC: 4 MMOL/L (ref 3.5–5.2)
PROT SERPL-MCNC: 6.5 G/DL (ref 6–8.5)
SODIUM SERPL-SCNC: 140 MMOL/L (ref 136–145)
TRIGL SERPL-MCNC: 206 MG/DL (ref 0–150)
VLDLC SERPL CALC-MCNC: 41.2 MG/DL

## 2019-06-07 RX ORDER — GLIPIZIDE 10 MG/1
TABLET ORAL
Qty: 180 TABLET | Refills: 0 | Status: SHIPPED | OUTPATIENT
Start: 2019-06-07 | End: 2019-09-06 | Stop reason: SDUPTHER

## 2019-06-07 RX ORDER — PAROXETINE HYDROCHLORIDE 20 MG/1
TABLET, FILM COATED ORAL
Qty: 90 TABLET | Refills: 0 | Status: SHIPPED | OUTPATIENT
Start: 2019-06-07 | End: 2019-09-06 | Stop reason: SDUPTHER

## 2019-07-08 RX ORDER — AMLODIPINE BESYLATE 5 MG/1
TABLET ORAL
Qty: 90 TABLET | Refills: 1 | Status: SHIPPED | OUTPATIENT
Start: 2019-07-08 | End: 2020-01-06 | Stop reason: SDUPTHER

## 2019-07-08 RX ORDER — HYDROCHLOROTHIAZIDE 25 MG/1
TABLET ORAL
Qty: 90 TABLET | Refills: 1 | Status: SHIPPED | OUTPATIENT
Start: 2019-07-08 | End: 2020-01-06 | Stop reason: SDUPTHER

## 2019-07-08 RX ORDER — LISINOPRIL 40 MG/1
TABLET ORAL
Qty: 90 TABLET | Refills: 1 | Status: SHIPPED | OUTPATIENT
Start: 2019-07-08 | End: 2020-01-06 | Stop reason: SDUPTHER

## 2019-08-14 RX ORDER — ATENOLOL 100 MG/1
TABLET ORAL
Qty: 90 TABLET | Refills: 1 | Status: SHIPPED | OUTPATIENT
Start: 2019-08-14 | End: 2020-02-18 | Stop reason: SDUPTHER

## 2019-09-03 DIAGNOSIS — E11.9 TYPE 2 DIABETES MELLITUS WITHOUT COMPLICATION, WITHOUT LONG-TERM CURRENT USE OF INSULIN (HCC): ICD-10-CM

## 2019-09-03 DIAGNOSIS — E78.5 HYPERLIPIDEMIA, UNSPECIFIED HYPERLIPIDEMIA TYPE: ICD-10-CM

## 2019-09-03 RX ORDER — ATORVASTATIN CALCIUM 20 MG/1
TABLET, FILM COATED ORAL
Qty: 90 TABLET | Refills: 1 | Status: SHIPPED | OUTPATIENT
Start: 2019-09-03 | End: 2020-03-05 | Stop reason: SDUPTHER

## 2019-09-03 RX ORDER — SITAGLIPTIN 100 MG/1
TABLET, FILM COATED ORAL
Qty: 90 TABLET | Refills: 1 | Status: SHIPPED | OUTPATIENT
Start: 2019-09-03 | End: 2020-03-05 | Stop reason: SDUPTHER

## 2019-09-06 DIAGNOSIS — F32.89 OTHER DEPRESSION: ICD-10-CM

## 2019-09-06 DIAGNOSIS — F41.9 ANXIETY: ICD-10-CM

## 2019-09-09 RX ORDER — PAROXETINE HYDROCHLORIDE 20 MG/1
TABLET, FILM COATED ORAL
Qty: 90 TABLET | Refills: 0 | Status: SHIPPED | OUTPATIENT
Start: 2019-09-09 | End: 2019-12-09 | Stop reason: SDUPTHER

## 2019-09-09 RX ORDER — GLIPIZIDE 10 MG/1
TABLET ORAL
Qty: 180 TABLET | Refills: 0 | Status: SHIPPED | OUTPATIENT
Start: 2019-09-09 | End: 2019-12-11 | Stop reason: SDUPTHER

## 2019-09-24 ENCOUNTER — OFFICE VISIT (OUTPATIENT)
Dept: ENDOCRINOLOGY | Facility: CLINIC | Age: 64
End: 2019-09-24

## 2019-09-24 VITALS
SYSTOLIC BLOOD PRESSURE: 110 MMHG | DIASTOLIC BLOOD PRESSURE: 70 MMHG | OXYGEN SATURATION: 98 % | WEIGHT: 162 LBS | BODY MASS INDEX: 30.61 KG/M2 | HEART RATE: 93 BPM

## 2019-09-24 DIAGNOSIS — E11.65 UNCONTROLLED TYPE 2 DIABETES MELLITUS WITH HYPERGLYCEMIA (HCC): Primary | ICD-10-CM

## 2019-09-24 LAB
GLUCOSE BLDC GLUCOMTR-MCNC: 10 MG/DL (ref 70–130)
HBA1C MFR BLD: 437 %

## 2019-09-24 PROCEDURE — 99204 OFFICE O/P NEW MOD 45 MIN: CPT | Performed by: PHYSICIAN ASSISTANT

## 2019-09-24 PROCEDURE — 82947 ASSAY GLUCOSE BLOOD QUANT: CPT | Performed by: PHYSICIAN ASSISTANT

## 2019-09-24 PROCEDURE — 83036 HEMOGLOBIN GLYCOSYLATED A1C: CPT | Performed by: PHYSICIAN ASSISTANT

## 2019-09-24 RX ORDER — INSULIN GLARGINE 100 [IU]/ML
INJECTION, SOLUTION SUBCUTANEOUS
Qty: 3 PEN | Refills: 6 | Status: SHIPPED | OUTPATIENT
Start: 2019-09-24 | End: 2020-04-02 | Stop reason: SDUPTHER

## 2019-09-24 RX ORDER — METFORMIN HYDROCHLORIDE 500 MG/1
1000 TABLET, EXTENDED RELEASE ORAL
Qty: 120 TABLET | Refills: 6 | Status: SHIPPED | OUTPATIENT
Start: 2019-09-24 | End: 2019-09-30 | Stop reason: SDUPTHER

## 2019-09-24 NOTE — PROGRESS NOTES
"Chief Complaint  Establish care for Diabetes Mellitus.    HPI   Zeinab Rodriguez is a 64 y.o. female who is here today for evaluation of Diabetes Mellitus type 2. The initial diagnosis of diabetes was made approximately 2009.     A1C- 9.9 (9/24/19), 9.5 (6/6/19)    Diabetic complications: peripheral neuropathy- tingling  Eye exam current (within one year): utd  Foot care and dental care: discussed    Current diabetic medications include:  Metformin 1000mg BID- bouts of diarrhea   Glipizide 10mg BID  Januvia 100mg QD - expensive     Statin: lipitor 20    Past medications: none    Diabetic Monitoring  -   No meter or log for review. Reports check BS about twice a week with FBS usually in the 200s.     Nutrition:     Current diet: in general, an \"unhealthy\" diet, on average, 3 meals per day  Current exercise: none    The following portions of the patient's history were reviewed and updated by me as appropriate: allergies, current medications, past family history, past social history, past surgical history and problem list.    Past Medical History:   Diagnosis Date   • Anxiety    • Breast cancer (CMS/HCC)    • Depression    • Diabetes mellitus (CMS/HCC)    • Glaucoma    • Hypertension    • Myopic degeneration        Medications    Current Outpatient Medications:   •  amLODIPine (NORVASC) 5 MG tablet, TAKE (1) TABLET DAILY FOR HEART., Disp: 90 tablet, Rfl: 1  •  aspirin 81 MG tablet, Take  by mouth daily., Disp: , Rfl:   •  atenolol (TENORMIN) 100 MG tablet, TAKE 1 TABLET DAILY, Disp: 90 tablet, Rfl: 1  •  atorvastatin (LIPITOR) 20 MG tablet, TAKE 1 TABLET ONCE DAILY FOR CHOLESTEROL, Disp: 90 tablet, Rfl: 1  •  CILOXAN 0.3 % ophthalmic ointment, , Disp: , Rfl:   •  dorzolamide-timolol (COSOPT) 22.3-6.8 MG/ML ophthalmic solution, Apply  to eye 2 (two) times a day., Disp: , Rfl:   •  glipiZIDE (GLUCOTROL) 10 MG tablet, TAKE 1 TABLET TWICE DAILY FOR DIABETES, Disp: 180 tablet, Rfl: 0  •  glucose blood test strip, Use daily to " check blood glucose for dx E11.9, Disp: 50 each, Rfl: 10  •  hydrochlorothiazide (HYDRODIURIL) 25 MG tablet, TAKE (1) TABLET DAILY FOR FLUID., Disp: 90 tablet, Rfl: 1  •  JANUVIA 100 MG tablet, TAKE 1 TABLET ONCE DAILY FOR DIABETES, Disp: 90 tablet, Rfl: 1  •  Lancets misc, USE TO TEST ONCE DAILY AND AS NEEDED, Disp: 100 each, Rfl: 3  •  latanoprost (XALATAN) 0.005 % ophthalmic solution, , Disp: , Rfl:   •  lisinopril (PRINIVIL,ZESTRIL) 40 MG tablet, TAKE (1) TABLET DAILY FOR HIGH BLOOD PRESSURE., Disp: 90 tablet, Rfl: 1  •  metFORMIN (GLUCOPHAGE) 1000 MG tablet, TAKE 1 TABLET TWICE A DAY (WITH MEALS), Disp: 180 tablet, Rfl: 0  •  metroNIDAZOLE (METROGEL) 1 % gel, Apply  topically Daily., Disp: 60 g, Rfl: 3  •  Multiple Vitamins-Minerals (ICAPS AREDS FORMULA PO), Take  by mouth 2 (two) times a day., Disp: , Rfl:   •  PARoxetine (PAXIL) 20 MG tablet, TAKE (1) TABLET DAILY IN THE MORNING., Disp: 90 tablet, Rfl: 0    Review of Systems  Review of Systems   Constitutional: Positive for fatigue. Negative for chills, fever and unexpected weight change.        Feeling poorly   HENT: Negative for ear pain, hearing loss, nosebleeds, rhinorrhea and sore throat.    Eyes: Positive for visual disturbance. Negative for pain, discharge, redness and itching.   Respiratory: Negative for cough, shortness of breath and wheezing.    Cardiovascular: Negative for chest pain, palpitations and leg swelling.   Gastrointestinal: Positive for diarrhea and nausea. Negative for abdominal pain, blood in stool and constipation.   Endocrine: Negative for cold intolerance, heat intolerance and polydipsia.   Genitourinary: Negative for dysuria, frequency, hematuria, pelvic pain, vaginal bleeding and vaginal discharge.   Musculoskeletal: Positive for arthralgias. Negative for gait problem, joint swelling and myalgias.   Skin: Negative for rash.   Allergic/Immunologic: Negative.    Neurological: Negative for dizziness, syncope, weakness, numbness and  headaches.   Hematological: Negative for adenopathy. Does not bruise/bleed easily.   Psychiatric/Behavioral: Negative for sleep disturbance and suicidal ideas. The patient is not nervous/anxious.         Physical Exam    LMP  (LMP Unknown) Comment: scheduled for pap JEREMIE Watson in [facility] is no height or weight on file to calculate BMI.  Physical Exam   Constitutional: She is oriented to person, place, and time. She appears well-developed. No distress.   HENT:   Head: Normocephalic.   Right Ear: External ear normal.   Left Ear: External ear normal.   Mouth/Throat: No oropharyngeal exudate.   Eyes: Conjunctivae and lids are normal. Right eye exhibits no discharge. Left eye exhibits no discharge. Right pupil is reactive. Left pupil is reactive.   Neck: No JVD present. No tracheal deviation present. No thyroid mass and no thyromegaly present.   Cardiovascular: Normal rate, regular rhythm, normal heart sounds and intact distal pulses.   No murmur heard.  Pulmonary/Chest: Effort normal and breath sounds normal. No respiratory distress. She has no wheezes.   Abdominal: Soft. Bowel sounds are normal. There is no tenderness.   Musculoskeletal: She exhibits no edema or tenderness.    Zeinab had a diabetic foot exam performed today.   During the foot exam she had a monofilament test performed.    Neurological Sensory Findings -  Unaltered sharp/dull right ankle/foot discrimination and unaltered sharp/dull left ankle/foot discrimination.  Vascular Status -  Her right foot exhibits normal foot vasculature  and no edema. Her left foot exhibits normal foot vasculature  and no edema.  Skin Integrity  -  Her right foot skin is intact.  She has no right foot onychomycosis, no right foot ulcer and non-callous right foot.Her left foot skin is intact. She has no left foot onychomycosis, no left foot ulcer and non-callous left foot..  Lymphadenopathy:     She has no cervical adenopathy.   Neurological: She is alert and  oriented to person, place, and time.   Skin: Skin is warm, dry and intact. No rash noted. She is not diaphoretic. No erythema.   Psychiatric: She has a normal mood and affect. Her speech is normal and behavior is normal. Thought content normal.       Labs and Imaging   Lab Results   Component Value Date    HGBA1C 9.50 (H) 06/06/2019    HGBA1C 9.30 (H) 02/05/2019    HGBA1C 9.10 (H) 10/02/2018     No visits with results within 1 Month(s) from this visit.   Latest known visit with results is:   Office Visit on 06/06/2019   Component Date Value Ref Range Status   • Glucose 06/06/2019 309* 65 - 99 mg/dL Final   • BUN 06/06/2019 11  8 - 23 mg/dL Final   • Creatinine 06/06/2019 0.72  0.57 - 1.00 mg/dL Final   • eGFR Non  Am 06/06/2019 82  >60 mL/min/1.73 Final   • eGFR African Am 06/06/2019 99  >60 mL/min/1.73 Final   • BUN/Creatinine Ratio 06/06/2019 15.3  7.0 - 25.0 Final   • Sodium 06/06/2019 140  136 - 145 mmol/L Final   • Potassium 06/06/2019 4.0  3.5 - 5.2 mmol/L Final   • Chloride 06/06/2019 99  98 - 107 mmol/L Final   • Total CO2 06/06/2019 28.0  22.0 - 29.0 mmol/L Final   • Calcium 06/06/2019 9.5  8.6 - 10.5 mg/dL Final   • Total Protein 06/06/2019 6.5  6.0 - 8.5 g/dL Final   • Albumin 06/06/2019 3.80  3.50 - 5.20 g/dL Final   • Globulin 06/06/2019 2.7  gm/dL Final   • A/G Ratio 06/06/2019 1.4  g/dL Final   • Total Bilirubin 06/06/2019 0.6  0.2 - 1.2 mg/dL Final   • Alkaline Phosphatase 06/06/2019 82  39 - 117 U/L Final   • AST (SGOT) 06/06/2019 25  1 - 32 U/L Final   • ALT (SGPT) 06/06/2019 19  1 - 33 U/L Final   • Total Cholesterol 06/06/2019 113  0 - 200 mg/dL Final   • Triglycerides 06/06/2019 206* 0 - 150 mg/dL Final   • HDL Cholesterol 06/06/2019 38* 40 - 60 mg/dL Final   • VLDL Cholesterol 06/06/2019 41.2  mg/dL Final   • LDL Cholesterol  06/06/2019 34  0 - 100 mg/dL Final   • Hemoglobin A1C 06/06/2019 9.50* 4.80 - 5.60 % Final    Comment: Hemoglobin A1C Ranges:  Increased Risk for Diabetes  5.7% to  6.4%  Diabetes                     >= 6.5%  Diabetic Goal                < 7.0%     • Color 06/06/2019 Yellow  Yellow, Straw, Dark Yellow, Kerry Final   • Clarity, UA 06/06/2019 Clear  Clear Final   • Specific Gravity  06/06/2019 1.025  1.005 - 1.030 Final   • pH, Urine 06/06/2019 5.0  5.0 - 8.0 Final   • Leukocytes 06/06/2019 500 Eugene/ul* Negative Final   • Nitrite, UA 06/06/2019 Negative  Negative Final   • Protein, POC 06/06/2019 Negative  Negative mg/dL Final   • Glucose, UA 06/06/2019 >=1000 mg/dL (3+)* Negative, 1000 mg/dL (3+) mg/dL Final   • Ketones, UA 06/06/2019 Negative  Negative Final   • Urobilinogen, UA 06/06/2019 Normal  Normal Final   • Bilirubin 06/06/2019 Negative  Negative Final   • Blood, UA 06/06/2019 Negative  Negative Final   • Lot Number 06/06/2019 [id number]   Final   • Expiration Date 06/06/2019 01/31/20   Final   • Microalbumin, Urine 06/06/2019 80   Final   • Creatinine, Urine 06/06/2019 200   Final   • Lot Number 06/06/2019 [id number]   Final   • Expiration Date 06/06/2019 2/29/20   Final     No images are attached to the encounter or orders placed in the encounter.  No Images in the past 120 days found..    Assessment / Plan   Zeinab was seen today for establish care.    Diagnoses and all orders for this visit:    Uncontrolled type 2 diabetes mellitus with hyperglycemia (CMS/[facility])        Diabetes Mellitus 2 is under poor control.  -A1c 9.9  -Due to diarrhea change metformin to extended release. Take 500mg 2 pills 2x/day.  -Start Jardiance 10mg daily.   -Start basaglar insulin 20 units at bedtime. After a week increase to 30 units at bedtime. Administration reviewed, samples provided.   -Continue Januvia 100mg daily  -Continue glipizide 10mg 2x/day. Make sure to take this medicine before you eat.     1.  Diet: 3-4 carb servings per meal for females, 4-5 carb servings per meal for males  Spread carb intake throughout the day  Increase lean protein and vegetable intake  Avoid sugary drinks and  processed carbs including crackers, cookies, cakes  2.  Exercise: Recommend at least 30 minutes of exercise daily, at least 5 days per week. Increase exercise gradually.   3.  Blood Glucose Goal: Blood glucose goal <150 fasting, <180 2 hr postprandial  4.  Microalbumin due 2/2020  5.  Education performed during this visit: long term diabetic complications discussed. , annual eye examinations at Ophthalmology discussed, dental hygiene discussed  and foot care reviewed., home glucose monitoring emphasized, all medications, side effects and compliance discussed carefully and Hypoglycemia management and prevention reviewed. Reviewed ‘ABCs’ of diabetes management (respective goals in parentheses):  A1C (<7), blood pressure (<130/80), and cholesterol (LDL <100, if CVD <70).    There are no Patient Instructions on file for this visit.    Follow up: Return in about 3 months (around 12/24/2019).    Discussed the nature of the disease including, risks, complications, implications, management, safe and proper use of medications. Encouraged therapeutic lifestyle changes including low calorie diet with plenty of fruits and vegetables, daily exercise, medication compliance, and keeping scheduled follow up appointments with me and any other providers. Encouraged patient to have appointment for complete physical, fasting labs, appropriate screenings, and immunizations on an annual basis.      Rosario Claudio PA-C

## 2019-09-24 NOTE — PATIENT INSTRUCTIONS
Change metformin to extended release. You'll take 500mg 2 pills 2x/day.  Start Jardiance 10mg daily.   Start basaglar insulin 20 units at bedtime. After a week increase to 30 units at bedtime.  Continue Januvia 100mg daily  Continue glipizide 10mg 2x/day. Make sure to take this medicine before you eat.

## 2019-09-30 ENCOUNTER — TELEPHONE (OUTPATIENT)
Dept: INTERNAL MEDICINE | Facility: CLINIC | Age: 64
End: 2019-09-30

## 2019-09-30 DIAGNOSIS — E11.65 UNCONTROLLED TYPE 2 DIABETES MELLITUS WITH HYPERGLYCEMIA (HCC): ICD-10-CM

## 2019-09-30 RX ORDER — METFORMIN HYDROCHLORIDE 500 MG/1
1000 TABLET, EXTENDED RELEASE ORAL 2 TIMES DAILY
Qty: 120 TABLET | Refills: 6 | Status: SHIPPED | OUTPATIENT
Start: 2019-09-30 | End: 2020-04-23 | Stop reason: SDUPTHER

## 2019-09-30 NOTE — TELEPHONE ENCOUNTER
PATIENT WOULD LIKE TO KNOW IF SHE NEEDS TO CHANGE HER MG ON HER METFORMIN. PLEASE CONTACT PATIENT -[phone]

## 2019-09-30 NOTE — TELEPHONE ENCOUNTER
Spoke with pt and Metformin had been sent in incorrectly, I corrected the Rx and sent it to the pharmacy

## 2019-10-07 DIAGNOSIS — E11.9 TYPE 2 DIABETES MELLITUS WITHOUT COMPLICATION, WITHOUT LONG-TERM CURRENT USE OF INSULIN (HCC): ICD-10-CM

## 2019-12-05 ENCOUNTER — OFFICE VISIT (OUTPATIENT)
Dept: INTERNAL MEDICINE | Facility: CLINIC | Age: 64
End: 2019-12-05

## 2019-12-05 VITALS
HEART RATE: 64 BPM | BODY MASS INDEX: 30.04 KG/M2 | SYSTOLIC BLOOD PRESSURE: 124 MMHG | TEMPERATURE: 98.2 F | DIASTOLIC BLOOD PRESSURE: 62 MMHG | RESPIRATION RATE: 16 BRPM | WEIGHT: 159 LBS

## 2019-12-05 DIAGNOSIS — I10 ESSENTIAL HYPERTENSION: ICD-10-CM

## 2019-12-05 DIAGNOSIS — F33.41 RECURRENT MAJOR DEPRESSIVE DISORDER, IN PARTIAL REMISSION (HCC): ICD-10-CM

## 2019-12-05 DIAGNOSIS — Z23 IMMUNIZATION DUE: ICD-10-CM

## 2019-12-05 DIAGNOSIS — E78.5 HYPERLIPIDEMIA, UNSPECIFIED HYPERLIPIDEMIA TYPE: Primary | ICD-10-CM

## 2019-12-05 PROCEDURE — 99214 OFFICE O/P EST MOD 30 MIN: CPT | Performed by: INTERNAL MEDICINE

## 2019-12-05 PROCEDURE — G0008 ADMIN INFLUENZA VIRUS VAC: HCPCS | Performed by: INTERNAL MEDICINE

## 2019-12-05 PROCEDURE — 90670 PCV13 VACCINE IM: CPT | Performed by: INTERNAL MEDICINE

## 2019-12-05 PROCEDURE — 90653 IIV ADJUVANT VACCINE IM: CPT | Performed by: INTERNAL MEDICINE

## 2019-12-05 PROCEDURE — G0009 ADMIN PNEUMOCOCCAL VACCINE: HCPCS | Performed by: INTERNAL MEDICINE

## 2019-12-05 PROCEDURE — 36415 COLL VENOUS BLD VENIPUNCTURE: CPT | Performed by: INTERNAL MEDICINE

## 2019-12-05 NOTE — PATIENT INSTRUCTIONS
Heart-Healthy Eating Plan  Heart-healthy meal planning includes:  · Eating less unhealthy fats.  · Eating more healthy fats.  · Making other changes in your diet.  Talk with your doctor or a diet specialist (dietitian) to create an eating plan that is right for you.  What are tips for following this plan?  Cooking  Avoid frying your food. Try to bake, boil, grill, or broil it instead. You can also reduce fat by:  · Removing the skin from poultry.  · Removing all visible fats from meats.  · Steaming vegetables in water or broth.  Meal planning    · At meals, divide your plate into four equal parts:  ? Fill one-half of your plate with vegetables and green salads.  ? Fill one-fourth of your plate with whole grains.  ? Fill one-fourth of your plate with lean protein foods.  · Eat 4-5 servings of vegetables per day. A serving of vegetables is:  ? 1 cup of raw or cooked vegetables.  ? 2 cups of raw leafy greens.  · Eat 4-5 servings of fruit per day. A serving of fruit is:  ? 1 medium whole fruit.  ? ¼ cup of dried fruit.  ? ½ cup of fresh, frozen, or canned fruit.  ? ½ cup of 100% fruit juice.  · Eat more foods that have soluble fiber. These are apples, broccoli, carrots, beans, peas, and barley. Try to get 20-30 g of fiber per day.  · Eat 4-5 servings of nuts, legumes, and seeds per week:  ? 1 serving of dried beans or legumes equals ½ cup after being cooked.  ? 1 serving of nuts is ¼ cup.  ? 1 serving of seeds equals 1 tablespoon.  General information  · Eat more home-cooked food. Eat less restaurant, buffet, and fast food.  · Limit or avoid alcohol.  · Limit foods that are high in starch and sugar.  · Avoid fried foods.  · Lose weight if you are overweight.  · Keep track of how much salt (sodium) you eat. This is important if you have high blood pressure. Ask your doctor to tell you more about this.  · Try to add vegetarian meals each week.  Fats  · Choose healthy fats. These include olive oil and canola oil,  flaxseeds, walnuts, almonds, and seeds.  · Eat more omega-3 fats. These include salmon, mackerel, sardines, tuna, flaxseed oil, and ground flaxseeds. Try to eat fish at least 2 times each week.  · Check food labels. Avoid foods with trans fats or high amounts of saturated fat.  · Limit saturated fats.  ? These are often found in animal products, such as meats, butter, and cream.  ? These are also found in plant foods, such as palm oil, palm kernel oil, and coconut oil.  · Avoid foods with partially hydrogenated oils in them. These have trans fats. Examples are stick margarine, some tub margarines, cookies, crackers, and other baked goods.  What foods can I eat?  Fruits  All fresh, canned (in natural juice), or frozen fruits.  Vegetables  Fresh or frozen vegetables (raw, steamed, roasted, or grilled). Green salads.  Grains  Most grains. Choose whole wheat and whole grains most of the time. Rice and pasta, including brown rice and pastas made with whole wheat.  Meats and other proteins  Lean, well-trimmed beef, veal, pork, and lamb. Chicken and turkey without skin. All fish and shellfish. Wild duck, rabbit, pheasant, and venison. Egg whites or low-cholesterol egg substitutes. Dried beans, peas, lentils, and tofu. Seeds and most nuts.  Dairy  Low-fat or nonfat cheeses, including ricotta and mozzarella. Skim or 1% milk that is liquid, powdered, or evaporated. Buttermilk that is made with low-fat milk. Nonfat or low-fat yogurt.  Fats and oils  Non-hydrogenated (trans-free) margarines. Vegetable oils, including soybean, sesame, sunflower, olive, peanut, safflower, corn, canola, and cottonseed. Salad dressings or mayonnaise made with a vegetable oil.  Beverages  Mineral water. Coffee and tea. Diet carbonated beverages.  Sweets and desserts  Sherbet, gelatin, and fruit ice. Small amounts of dark chocolate.  Limit all sweets and desserts.  Seasonings and condiments  All seasonings and condiments.  The items listed above may  not be a complete list of foods and drinks you can eat. Contact a dietitian for more options.  What foods should I avoid?  Fruits  Canned fruit in heavy syrup. Fruit in cream or butter sauce. Fried fruit. Limit coconut.  Vegetables  Vegetables cooked in cheese, cream, or butter sauce. Fried vegetables.  Grains  Breads that are made with saturated or trans fats, oils, or whole milk. Croissants. Sweet rolls. Donuts. High-fat crackers, such as cheese crackers.  Meats and other proteins  Fatty meats, such as hot dogs, ribs, sausage, weaver, rib-eye roast or steak. High-fat deli meats, such as salami and bologna. Caviar. Domestic duck and goose. Organ meats, such as liver.  Dairy  Cream, sour cream, cream cheese, and creamed cottage cheese. Whole-milk cheeses. Whole or 2% milk that is liquid, evaporated, or condensed. Whole buttermilk. Cream sauce or high-fat cheese sauce. Yogurt that is made from whole milk.  Fats and oils  Meat fat, or shortening. Cocoa butter, hydrogenated oils, palm oil, coconut oil, palm kernel oil. Solid fats and shortenings, including weaver fat, salt pork, lard, and butter. Nondairy cream substitutes. Salad dressings with cheese or sour cream.  Beverages  Regular sodas and juice drinks with added sugar.  Sweets and desserts  Frosting. Pudding. Cookies. Cakes. Pies. Milk chocolate or white chocolate. Buttered syrups. Full-fat ice cream or ice cream drinks.  The items listed above may not be a complete list of foods and drinks to avoid. Contact a dietitian for more information.  Summary  · Heart-healthy meal planning includes eating less unhealthy fats, eating more healthy fats, and making other changes in your diet.  · Eat a balanced diet. This includes fruits and vegetables, low-fat or nonfat dairy, lean protein, nuts and legumes, whole grains, and heart-healthy oils and fats.  This information is not intended to replace advice given to you by your health care provider. Make sure you discuss any  questions you have with your health care provider.  Document Released: 06/18/2013 Document Revised: 01/25/2019 Document Reviewed: 01/25/2019  [employer] Interactive Patient Education © 2019 [employer]

## 2019-12-05 NOTE — PROGRESS NOTES
Chief Complaint   Patient presents with   • Follow-up     6 month follow up chronic medical problems       History of Present Illness    The patient presents for a follow-up related to hyperlipidemia. She is not following a low fat diet. She reports that she is not exercising. She is taking atorvastatin. The patient is taking her medication as prescribed. She reports no medication side effects, including muscle cramps, abdominal pain, headaches or weakness. She denies chest pain, shortness of breath, orthopnea, paroxysmal nocturnal dyspnea, dyspnea on exertion, edema, palpitations or syncope.    The patient presents for a follow-up related to hypertension. The patient reports that she has had no headaches or blurred vision. She states that she is taking her medication as prescribed. She is not having medication side effects.    The patient presents for follow-up of depression. She denies currently having depression symptoms. She denies suicidal ideation. Her energy level is good. She denies agorophobia. She sleeps well. She is not tearful. She states that her current depression symptoms are stable. She is currently taking a medication. The current medication regimen consists of Paxil. The patient denies having medication side effects including nausea, headaches, anxiety, increased depression or fatigue.    Review of Systems    CONSTITUTIONAL- Denies Unexplained Weight Loss, Fever, Chills, Sweats, Weakness or Malaise.    PULMONARY- Denies Wheezing, Sputum Production, Cough, Hemoptysis or Pleuritic Chest Pain.    CARDIOVASCULAR- Denies Claudication or Irregular Heart Beat.    Medications      Current Outpatient Medications:   •  amLODIPine (NORVASC) 5 MG tablet, TAKE (1) TABLET DAILY FOR HEART., Disp: 90 tablet, Rfl: 1  •  aspirin 81 MG tablet, Take  by mouth daily., Disp: , Rfl:   •  atenolol (TENORMIN) 100 MG tablet, TAKE 1 TABLET DAILY, Disp: 90 tablet, Rfl: 1  •  atorvastatin (LIPITOR) 20 MG tablet, TAKE 1 TABLET  ONCE DAILY FOR CHOLESTEROL, Disp: 90 tablet, Rfl: 1  •  CILOXAN 0.3 % ophthalmic ointment, , Disp: , Rfl:   •  dorzolamide-timolol (COSOPT) 22.3-6.8 MG/ML ophthalmic solution, Apply  to eye 2 (two) times a day., Disp: , Rfl:   •  Empagliflozin (JARDIANCE) 10 MG tablet, Take 10 mg by mouth Daily., Disp: 30 tablet, Rfl: 6  •  glipiZIDE (GLUCOTROL) 10 MG tablet, TAKE 1 TABLET TWICE DAILY FOR DIABETES, Disp: 180 tablet, Rfl: 0  •  glucose blood (TRUE METRIX BLOOD GLUCOSE TEST) test strip, Use to test blood glucose once daily and as needed, Disp: 100 each, Rfl: 3  •  hydrochlorothiazide (HYDRODIURIL) 25 MG tablet, TAKE (1) TABLET DAILY FOR FLUID., Disp: 90 tablet, Rfl: 1  •  Insulin Glargine (BASAGLAR KWIKPEN) 100 UNIT/ML injection pen, Start with 20u qhs, after a week increase to 30u qhs, Disp: 3 pen, Rfl: 6  •  Insulin Pen Needle 32G X 4 MM misc, Use daily with insulin, Disp: 50 each, Rfl: 11  •  JANUVIA 100 MG tablet, TAKE 1 TABLET ONCE DAILY FOR DIABETES, Disp: 90 tablet, Rfl: 1  •  Lancets misc, USE TO TEST ONCE DAILY AND AS NEEDED, Disp: 100 each, Rfl: 3  •  latanoprost (XALATAN) 0.005 % ophthalmic solution, , Disp: , Rfl:   •  lisinopril (PRINIVIL,ZESTRIL) 40 MG tablet, TAKE (1) TABLET DAILY FOR HIGH BLOOD PRESSURE., Disp: 90 tablet, Rfl: 1  •  metFORMIN ER (GLUCOPHAGE-XR) 500 MG 24 hr tablet, Take 2 tablets by mouth 2 (Two) Times a Day., Disp: 120 tablet, Rfl: 6  •  metroNIDAZOLE (METROGEL) 1 % gel, Apply  topically Daily., Disp: 60 g, Rfl: 3  •  Multiple Vitamins-Minerals (ICAPS AREDS FORMULA PO), Take  by mouth 2 (two) times a day., Disp: , Rfl:   •  PARoxetine (PAXIL) 20 MG tablet, TAKE (1) TABLET DAILY IN THE MORNING., Disp: 90 tablet, Rfl: 0     Allergies    No Known Allergies    Problem List    Patient Active Problem List   Diagnosis   • Abnormal mammogram   • Anemia   • Depression   • Uncontrolled type 2 diabetes mellitus with hyperglycemia (CMS/HCC)   • Hyperlipidemia   • Hypertension   • Anxiety        Medications, Allergies, Problems List and Past History were reviewed and updated.    Physical Examination    /62 (BP Location: Right arm, Patient Position: Sitting, Cuff Size: Adult)   Pulse 64   Temp 98.2 °F (36.8 °C) (Temporal)   Resp 16   Wt 72.1 kg (159 lb)   LMP  (LMP Unknown) Comment: scheduled for pap JEREMIE Watson in [place]  Breastfeeding? No   BMI 30.04 kg/m²     HEENT: Facies- Within normal limits. Lids- Normal bilaterally. Conjunctiva- Clear bilaterally. Sclera- Anicteric bilaterally.    Neck: Thyroid- non enlarged, symmetric and has no nodules. No bruits are detected.    Lungs: Auscultation- Clear to auscultation bilaterally. There are no retractions, clubbing or cyanosis. The Expiratory to Inspiratory ratio is equal.    Cardiovascular: There are no carotid bruits. Heart- Normal Rate with Regular rhythm and no murmurs. There are no gallops. There are no rubs. In the lower extremities there is no edema. The upper extremities do not have edema.    Abdomen: Soft, benign, non-tender with no masses, hernias, organomegaly or scars.    Impression and Assessment    Encounter for Immunization Administration.    Hyperlipidemia.    Essential Hypertension.    Major Depressive Disorder Single Episode Unspecified.    Plan    Hyperlipidemia Plan: The patient was instructed to exercise daily, eat a low fat diet and continue her medications.    Essential Hypertension Plan: The current plan was continued.    Major Depressive Disorder Single Episode Unspecified Plan: The current plan was continued.    Counseled regarding immunizations and applicable VIS given.    Immunizations Ordered and Administered: Jose 13.    Zeinab was seen today for follow-up.    Diagnoses and all orders for this visit:    Hyperlipidemia, unspecified hyperlipidemia type  -     Comprehensive Metabolic Panel  -     Lipid Panel    Essential hypertension  -     Comprehensive Metabolic Panel    Recurrent major depressive  disorder, in partial remission (CMS/[facility])    Immunization due  -     Pneumococcal Conjugate Vaccine 13-[facility]        Return to Office    The patient was instructed to return for follow-up in 6 months. Next Visit: Physical.    The patient was instructed to return sooner if the condition changes, worsens, or does not resolve.

## 2019-12-06 LAB
ALBUMIN SERPL-MCNC: 4.4 G/DL (ref 3.5–5.2)
ALBUMIN/GLOB SERPL: 1.9 G/DL
ALP SERPL-CCNC: 71 U/L (ref 39–117)
ALT SERPL-CCNC: 20 U/L (ref 1–33)
AST SERPL-CCNC: 25 U/L (ref 1–32)
BILIRUB SERPL-MCNC: 0.4 MG/DL (ref 0.2–1.2)
BUN SERPL-MCNC: 12 MG/DL (ref 8–23)
BUN/CREAT SERPL: 17.1 (ref 7–25)
CALCIUM SERPL-MCNC: 9.3 MG/DL (ref 8.6–10.5)
CHLORIDE SERPL-SCNC: 103 MMOL/L (ref 98–107)
CHOLEST SERPL-MCNC: 109 MG/DL (ref 0–200)
CO2 SERPL-SCNC: 28.5 MMOL/L (ref 22–29)
CREAT SERPL-MCNC: 0.7 MG/DL (ref 0.57–1)
GLOBULIN SER CALC-MCNC: 2.3 GM/DL
GLUCOSE SERPL-MCNC: 57 MG/DL (ref 65–99)
HDLC SERPL-MCNC: 38 MG/DL (ref 40–60)
LDLC SERPL CALC-MCNC: 46 MG/DL (ref 0–100)
POTASSIUM SERPL-SCNC: 5.3 MMOL/L (ref 3.5–5.2)
PROT SERPL-MCNC: 6.7 G/DL (ref 6–8.5)
SODIUM SERPL-SCNC: 145 MMOL/L (ref 136–145)
TRIGL SERPL-MCNC: 125 MG/DL (ref 0–150)
VLDLC SERPL CALC-MCNC: 25 MG/DL

## 2019-12-09 DIAGNOSIS — F32.89 OTHER DEPRESSION: ICD-10-CM

## 2019-12-09 DIAGNOSIS — F41.9 ANXIETY: ICD-10-CM

## 2019-12-09 RX ORDER — PAROXETINE HYDROCHLORIDE 20 MG/1
TABLET, FILM COATED ORAL
Qty: 90 TABLET | Refills: 1 | Status: SHIPPED | OUTPATIENT
Start: 2019-12-09 | End: 2020-09-10 | Stop reason: SDUPTHER

## 2019-12-11 ENCOUNTER — OFFICE VISIT (OUTPATIENT)
Dept: ENDOCRINOLOGY | Facility: CLINIC | Age: 64
End: 2019-12-11

## 2019-12-11 VITALS
BODY MASS INDEX: 30.69 KG/M2 | OXYGEN SATURATION: 98 % | WEIGHT: 162.4 LBS | DIASTOLIC BLOOD PRESSURE: 80 MMHG | HEART RATE: 64 BPM | SYSTOLIC BLOOD PRESSURE: 122 MMHG

## 2019-12-11 DIAGNOSIS — E11.65 UNCONTROLLED TYPE 2 DIABETES MELLITUS WITH HYPERGLYCEMIA (HCC): Primary | ICD-10-CM

## 2019-12-11 LAB
GLUCOSE BLDC GLUCOMTR-MCNC: 105 MG/DL (ref 70–130)
HBA1C MFR BLD: 6.6 %

## 2019-12-11 PROCEDURE — 99214 OFFICE O/P EST MOD 30 MIN: CPT | Performed by: PHYSICIAN ASSISTANT

## 2019-12-11 PROCEDURE — 82947 ASSAY GLUCOSE BLOOD QUANT: CPT | Performed by: PHYSICIAN ASSISTANT

## 2019-12-11 PROCEDURE — 83036 HEMOGLOBIN GLYCOSYLATED A1C: CPT | Performed by: PHYSICIAN ASSISTANT

## 2019-12-11 RX ORDER — GLIPIZIDE 10 MG/1
TABLET ORAL
Qty: 180 TABLET | Refills: 1 | Status: SHIPPED | OUTPATIENT
Start: 2019-12-11 | End: 2020-06-15 | Stop reason: SDUPTHER

## 2019-12-11 NOTE — PROGRESS NOTES
"Chief Complaint  F/u for Diabetes Mellitus.    HPI   Zeinab Rodriguez is a 64 y.o. female who is here today for f/u of Diabetes Mellitus type 2. The initial diagnosis of diabetes was made approximately 2009.     Overall feels much better with improved sugar. Mood has improved and she has more energy.   Started exercising few days ago.     A1C- 6.6 (12/11/19), 9.9 (9/24/19), 9.5 (6/6/19)    Diabetic complications: peripheral neuropathy- tingling  Eye exam current (within one year): utd  Foot care and dental care: discussed    Current diabetic medications include:  Metformin ER 500mg 2 tab BID - tolerating better than immediate release  Glipizide 10mg BID  Januvia 100mg QD - expensive   Jardiance 10mg daily  Basaglar 30U QHS    Statin: lipitor 20    Past medications: none    Diabetic Monitoring  -   BS log reviewed- not checking often, few times per week, mostly readings 120-200s, no hypoglycemia    Nutrition:     Current diet: in general, an \"unhealthy\" diet, on average, 3 meals per day  Current exercise: none    The following portions of the patient's history were reviewed and updated by me as appropriate: allergies, current medications, past family history, past social history, past surgical history and problem list.    Past Medical History:   Diagnosis Date   • Anxiety    • Breast cancer (CMS/HCC)    • Depression    • Diabetes mellitus (CMS/HCC)    • Glaucoma    • Hypertension    • Myopic degeneration        Medications    Current Outpatient Medications:   •  amLODIPine (NORVASC) 5 MG tablet, TAKE (1) TABLET DAILY FOR HEART., Disp: 90 tablet, Rfl: 1  •  aspirin 81 MG tablet, Take  by mouth daily., Disp: , Rfl:   •  atenolol (TENORMIN) 100 MG tablet, TAKE 1 TABLET DAILY, Disp: 90 tablet, Rfl: 1  •  atorvastatin (LIPITOR) 20 MG tablet, TAKE 1 TABLET ONCE DAILY FOR CHOLESTEROL, Disp: 90 tablet, Rfl: 1  •  CILOXAN 0.3 % ophthalmic ointment, , Disp: , Rfl:   •  dorzolamide-timolol (COSOPT) 22.3-6.8 MG/ML ophthalmic " solution, Apply  to eye 2 (two) times a day., Disp: , Rfl:   •  Empagliflozin (JARDIANCE) 10 MG tablet, Take 10 mg by mouth Daily., Disp: 30 tablet, Rfl: 6  •  glipizide (GLUCOTROL) 10 MG tablet, Take one tablet bu mouth twice daily, Disp: 180 tablet, Rfl: 1  •  glucose blood (TRUE METRIX BLOOD GLUCOSE TEST) test strip, Use to test blood glucose once daily and as needed, Disp: 100 each, Rfl: 3  •  hydrochlorothiazide (HYDRODIURIL) 25 MG tablet, TAKE (1) TABLET DAILY FOR FLUID., Disp: 90 tablet, Rfl: 1  •  Insulin Glargine (BASAGLAR KWIKPEN) 100 UNIT/ML injection pen, Start with 20u qhs, after a week increase to 30u qhs, Disp: 3 pen, Rfl: 6  •  Insulin Pen Needle 32G X 4 MM misc, Use daily with insulin, Disp: 50 each, Rfl: 11  •  JANUVIA 100 MG tablet, TAKE 1 TABLET ONCE DAILY FOR DIABETES, Disp: 90 tablet, Rfl: 1  •  Lancets misc, USE TO TEST ONCE DAILY AND AS NEEDED, Disp: 100 each, Rfl: 3  •  latanoprost (XALATAN) 0.005 % ophthalmic solution, , Disp: , Rfl:   •  lisinopril (PRINIVIL,ZESTRIL) 40 MG tablet, TAKE (1) TABLET DAILY FOR HIGH BLOOD PRESSURE., Disp: 90 tablet, Rfl: 1  •  metFORMIN ER (GLUCOPHAGE-XR) 500 MG 24 hr tablet, Take 2 tablets by mouth 2 (Two) Times a Day., Disp: 120 tablet, Rfl: 6  •  metroNIDAZOLE (METROGEL) 1 % gel, Apply  topically Daily., Disp: 60 g, Rfl: 3  •  Multiple Vitamins-Minerals (ICAPS AREDS FORMULA PO), Take  by mouth 2 (two) times a day., Disp: , Rfl:   •  PARoxetine (PAXIL) 20 MG tablet, TAKE (1) TABLET DAILY IN THE MORNING., Disp: 90 tablet, Rfl: 1    Review of Systems  Review of Systems   Constitutional: Positive for fatigue. Negative for chills, fever and unexpected weight change.        Feeling poorly   HENT: Negative for ear pain, hearing loss, nosebleeds, rhinorrhea and sore throat.    Eyes: Negative for pain, discharge, redness and itching.   Respiratory: Negative for cough, shortness of breath and wheezing.    Cardiovascular: Negative for chest pain, palpitations and leg  swelling.   Gastrointestinal: Negative for abdominal pain, blood in stool and constipation.   Endocrine: Negative for cold intolerance, heat intolerance and polydipsia.   Genitourinary: Negative for dysuria, frequency, hematuria, pelvic pain, vaginal bleeding and vaginal discharge.   Musculoskeletal: Positive for arthralgias. Negative for gait problem, joint swelling and myalgias.   Skin: Negative for rash.   Allergic/Immunologic: Negative.    Neurological: Negative for dizziness, syncope, weakness, numbness and headaches.   Hematological: Negative for adenopathy. Does not bruise/bleed easily.   Psychiatric/Behavioral: Negative for sleep disturbance and suicidal ideas. The patient is not nervous/anxious.         Physical Exam    /80   Pulse 64   Wt 73.7 kg (162 lb 6.4 oz)   LMP  (LMP Unknown) Comment: scheduled for pap JEREMIE Watson in [place]  SpO2 98%   BMI 30.69 kg/m² Body mass index is 30.69 kg/m².  Physical Exam   Constitutional: She is oriented to person, place, and time. She appears well-developed. No distress.   HENT:   Head: Normocephalic.   Right Ear: External ear normal.   Left Ear: External ear normal.   Mouth/Throat: No oropharyngeal exudate.   Eyes: Conjunctivae and lids are normal. Right eye exhibits no discharge. Left eye exhibits no discharge. Right pupil is reactive. Left pupil is reactive.   Neck: No JVD present. No tracheal deviation present. No thyroid mass and no thyromegaly present.   Cardiovascular: Normal rate, regular rhythm, normal heart sounds and intact distal pulses.   No murmur heard.  Pulmonary/Chest: Effort normal and breath sounds normal. No respiratory distress. She has no wheezes.   Abdominal: Soft. Bowel sounds are normal. There is no tenderness.   Musculoskeletal: She exhibits no edema or tenderness.   Lymphadenopathy:     She has no cervical adenopathy.   Neurological: She is alert and oriented to person, place, and time.   Skin: Skin is warm, dry and intact. No  rash noted. She is not diaphoretic. No erythema.   Psychiatric: She has a normal mood and affect. Her speech is normal and behavior is normal. Thought content normal.       Labs and Imaging   Lab Results   Component Value Date    HGBA1C 6.6 12/11/2019    HGBA1C 437 09/24/2019    HGBA1C 9.50 (H) 06/06/2019     Office Visit on 12/11/2019   Component Date Value Ref Range Status   • Glucose 12/11/2019 105  70 - 130 mg/dL Final   • Hemoglobin A1C 12/11/2019 6.6  % Final   Office Visit on 12/05/2019   Component Date Value Ref Range Status   • Glucose 12/05/2019 57* 65 - 99 mg/dL Final   • BUN 12/05/2019 12  8 - 23 mg/dL Final   • Creatinine 12/05/2019 0.70  0.57 - 1.00 mg/dL Final   • eGFR Non  Am 12/05/2019 84  >60 mL/min/1.73 Final   • eGFR African Am 12/05/2019 102  >60 mL/min/1.73 Final   • BUN/Creatinine Ratio 12/05/2019 17.1  7.0 - 25.0 Final   • Sodium 12/05/2019 145  136 - 145 mmol/L Final   • Potassium 12/05/2019 5.3* 3.5 - 5.2 mmol/L Final   • Chloride 12/05/2019 103  98 - 107 mmol/L Final   • Total CO2 12/05/2019 28.5  22.0 - 29.0 mmol/L Final   • Calcium 12/05/2019 9.3  8.6 - 10.5 mg/dL Final   • Total Protein 12/05/2019 6.7  6.0 - 8.5 g/dL Final   • Albumin 12/05/2019 4.40  3.50 - 5.20 g/dL Final   • Globulin 12/05/2019 2.3  gm/dL Final   • A/G Ratio 12/05/2019 1.9  g/dL Final   • Total Bilirubin 12/05/2019 0.4  0.2 - 1.2 mg/dL Final   • Alkaline Phosphatase 12/05/2019 71  39 - 117 U/L Final   • AST (SGOT) 12/05/2019 25  1 - 32 U/L Final   • ALT (SGPT) 12/05/2019 20  1 - 33 U/L Final   • Total Cholesterol 12/05/2019 109  0 - 200 mg/dL Final   • Triglycerides 12/05/2019 125  0 - 150 mg/dL Final   • HDL Cholesterol 12/05/2019 38* 40 - 60 mg/dL Final   • VLDL Cholesterol 12/05/2019 25  mg/dL Final   • LDL Cholesterol  12/05/2019 46  0 - 100 mg/dL Final     No images are attached to the encounter or orders placed in the encounter.  No Images in the past 120 days found..    Assessment / Plan   Zeinab was  seen today for follow-up.    Diagnoses and all orders for this visit:    Uncontrolled type 2 diabetes mellitus with hyperglycemia (CMS/[facility])  -     POC Glucose Fingerstick  -     POC Glycosylated Hemoglobin (Hb A1C)  -     glipizide (GLUCOTROL) 10 MG tablet; Take one tablet bu mouth twice daily        Diabetes Mellitus 2 is under fair control.  -A1c 6.6, down from 9.9 9/2019  -some evening hyperglycemia  -continue metformin ER 500mg 2 pills 2x/day.  -continue Jardiance 10mg daily.   -continue basaglar 30u QHS   -Continue Januvia 100mg daily, samples provided. Consider change to GLP1 agonist.   -Continue glipizide 10mg 2x/day. Make sure to take this medicine before you eat.   -discussed watching carb intake, continue exercise    1.  Diet: 3-4 carb servings per meal for females, 4-5 carb servings per meal for males  Spread carb intake throughout the day  Increase lean protein and vegetable intake  Avoid sugary drinks and processed carbs including crackers, cookies, cakes  2.  Exercise: Recommend at least 30 minutes of exercise daily, at least 5 days per week. Increase exercise gradually.   3.  Blood Glucose Goal: Blood glucose goal <150 fasting, <180 2 hr postprandial  4.  Microalbumin due 2/2020  5.  Education performed during this visit: long term diabetic complications discussed. , annual eye examinations at Ophthalmology discussed, dental hygiene discussed  and foot care reviewed., home glucose monitoring emphasized, all medications, side effects and compliance discussed carefully and Hypoglycemia management and prevention reviewed. Reviewed ‘ABCs’ of diabetes management (respective goals in parentheses):  A1C (<7), blood pressure (<130/80), and cholesterol (LDL <100, if CVD <70).    There are no Patient Instructions on file for this visit.    Follow up: Return in about 3 months (around 3/11/2020).    Discussed the nature of the disease including, risks, complications, implications, management, safe and proper use  of medications. Encouraged therapeutic lifestyle changes including low calorie diet with plenty of fruits and vegetables, daily exercise, medication compliance, and keeping scheduled follow up appointments with me and any other providers. Encouraged patient to have appointment for complete physical, fasting labs, appropriate screenings, and immunizations on an annual basis.    15 min  of 25 min face-to-face visit time spent for coordination of care and counselling regarding identified problems as outlined in the objective, assessment and discussion portions of the documentation.      Rosario Claudio PA-C

## 2020-01-06 RX ORDER — HYDROCHLOROTHIAZIDE 25 MG/1
25 TABLET ORAL DAILY
Qty: 90 TABLET | Refills: 1 | Status: SHIPPED | OUTPATIENT
Start: 2020-01-06 | End: 2020-07-07 | Stop reason: SDUPTHER

## 2020-01-06 RX ORDER — LISINOPRIL 40 MG/1
40 TABLET ORAL DAILY
Qty: 90 TABLET | Refills: 1 | Status: SHIPPED | OUTPATIENT
Start: 2020-01-06 | End: 2020-07-07 | Stop reason: SDUPTHER

## 2020-01-06 RX ORDER — AMLODIPINE BESYLATE 5 MG/1
5 TABLET ORAL DAILY
Qty: 90 TABLET | Refills: 1 | Status: SHIPPED | OUTPATIENT
Start: 2020-01-06 | End: 2020-07-07 | Stop reason: SDUPTHER

## 2020-02-18 RX ORDER — ATENOLOL 100 MG/1
100 TABLET ORAL DAILY
Qty: 90 TABLET | Refills: 1 | Status: SHIPPED | OUTPATIENT
Start: 2020-02-18 | End: 2020-08-27 | Stop reason: SDUPTHER

## 2020-03-05 DIAGNOSIS — E11.9 TYPE 2 DIABETES MELLITUS WITHOUT COMPLICATION, WITHOUT LONG-TERM CURRENT USE OF INSULIN (HCC): ICD-10-CM

## 2020-03-05 DIAGNOSIS — E78.5 HYPERLIPIDEMIA, UNSPECIFIED HYPERLIPIDEMIA TYPE: ICD-10-CM

## 2020-03-05 RX ORDER — ATORVASTATIN CALCIUM 20 MG/1
20 TABLET, FILM COATED ORAL NIGHTLY
Qty: 90 TABLET | Refills: 1 | Status: SHIPPED | OUTPATIENT
Start: 2020-03-05 | End: 2020-09-01 | Stop reason: SDUPTHER

## 2020-03-11 ENCOUNTER — OFFICE VISIT (OUTPATIENT)
Dept: ENDOCRINOLOGY | Facility: CLINIC | Age: 65
End: 2020-03-11

## 2020-03-11 VITALS
BODY MASS INDEX: 30.38 KG/M2 | WEIGHT: 160.8 LBS | DIASTOLIC BLOOD PRESSURE: 64 MMHG | SYSTOLIC BLOOD PRESSURE: 110 MMHG | OXYGEN SATURATION: 98 % | HEART RATE: 64 BPM

## 2020-03-11 DIAGNOSIS — E11.65 UNCONTROLLED TYPE 2 DIABETES MELLITUS WITH HYPERGLYCEMIA (HCC): Primary | ICD-10-CM

## 2020-03-11 LAB
GLUCOSE BLDC GLUCOMTR-MCNC: 151 MG/DL (ref 70–130)
HBA1C MFR BLD: 7.6 %

## 2020-03-11 PROCEDURE — 99214 OFFICE O/P EST MOD 30 MIN: CPT | Performed by: PHYSICIAN ASSISTANT

## 2020-03-11 PROCEDURE — 82947 ASSAY GLUCOSE BLOOD QUANT: CPT | Performed by: PHYSICIAN ASSISTANT

## 2020-03-11 PROCEDURE — 83036 HEMOGLOBIN GLYCOSYLATED A1C: CPT | Performed by: PHYSICIAN ASSISTANT

## 2020-03-11 NOTE — PATIENT INSTRUCTIONS
Increase basaglar by 5 units every 3 days, up to 50 units daily. You're looking for fasting blood sugar readings consistently <150  Continue glipizide, Januvia, Jardiance, and metformin

## 2020-03-11 NOTE — PROGRESS NOTES
"Chief Complaint  F/u for Diabetes Mellitus.    HPI   Zeinab Rodriguez is a 64 y.o. female who is here today for f/u of Diabetes Mellitus type 2. The initial diagnosis of diabetes was made approximately 2009.     Pt reports to be doing well.     A1C- 7.6 (3/11/2020), 6.6 (12/11/19), 9.9 (9/24/19), 9.5 (6/6/19)    Diabetic complications: peripheral neuropathy- tingling  Eye exam current (within one year): utd  Foot care and dental care: discussed    Current diabetic medications include:  Metformin ER 500mg 2 tab BID - tolerating better than immediate release  Glipizide 10mg BID  Januvia 100mg QD - expensive   Jardiance 10mg daily  Basaglar 30U QHS    Statin: lipitor 20    Past medications: none    Diabetic Monitoring  -   BS log reviewed- checking few times per week, fbs 130-200, only 2 readings in the evening (162, 277)    Nutrition:     Current diet: in general, an \"unhealthy\" diet, on average, 3 meals per day  Current exercise: none    The following portions of the patient's history were reviewed and updated by me as appropriate: allergies, current medications, past family history, past social history, past surgical history and problem list.      Past Medical History:   Diagnosis Date   • Anxiety    • Breast cancer (CMS/HCC)    • Depression    • Diabetes mellitus (CMS/HCC)    • Glaucoma    • Hypertension    • Myopic degeneration        Medications    Current Outpatient Medications:   •  amLODIPine (NORVASC) 5 MG tablet, Take 1 tablet by mouth Daily., Disp: 90 tablet, Rfl: 1  •  aspirin 81 MG tablet, Take  by mouth daily., Disp: , Rfl:   •  atenolol (TENORMIN) 100 MG tablet, Take 1 tablet by mouth Daily., Disp: 90 tablet, Rfl: 1  •  atorvastatin (LIPITOR) 20 MG tablet, Take 1 tablet by mouth Every Night., Disp: 90 tablet, Rfl: 1  •  CILOXAN 0.3 % ophthalmic ointment, , Disp: , Rfl:   •  dorzolamide-timolol (COSOPT) 22.3-6.8 MG/ML ophthalmic solution, Apply  to eye 2 (two) times a day., Disp: , Rfl:   •  Empagliflozin " (JARDIANCE) 10 MG tablet, Take 10 mg by mouth Daily., Disp: 30 tablet, Rfl: 6  •  glipizide (GLUCOTROL) 10 MG tablet, Take one tablet bu mouth twice daily, Disp: 180 tablet, Rfl: 1  •  glucose blood (TRUE METRIX BLOOD GLUCOSE TEST) test strip, Use to test blood glucose once daily and as needed, Disp: 100 each, Rfl: 3  •  hydroCHLOROthiazide (HYDRODIURIL) 25 MG tablet, Take 1 tablet by mouth Daily., Disp: 90 tablet, Rfl: 1  •  Insulin Glargine (BASAGLAR KWIKPEN) 100 UNIT/ML injection pen, Start with 20u qhs, after a week increase to 30u qhs, Disp: 3 pen, Rfl: 6  •  Insulin Pen Needle 32G X 4 MM misc, Use daily with insulin, Disp: 50 each, Rfl: 11  •  Lancets misc, USE TO TEST ONCE DAILY AND AS NEEDED, Disp: 100 each, Rfl: 3  •  latanoprost (XALATAN) 0.005 % ophthalmic solution, , Disp: , Rfl:   •  lisinopril (PRINIVIL,ZESTRIL) 40 MG tablet, Take 1 tablet by mouth Daily., Disp: 90 tablet, Rfl: 1  •  metFORMIN ER (GLUCOPHAGE-XR) 500 MG 24 hr tablet, Take 2 tablets by mouth 2 (Two) Times a Day., Disp: 120 tablet, Rfl: 6  •  metroNIDAZOLE (METROGEL) 1 % gel, Apply  topically Daily., Disp: 60 g, Rfl: 3  •  Multiple Vitamins-Minerals (ICAPS AREDS FORMULA PO), Take  by mouth 2 (two) times a day., Disp: , Rfl:   •  PARoxetine (PAXIL) 20 MG tablet, TAKE (1) TABLET DAILY IN THE MORNING., Disp: 90 tablet, Rfl: 1  •  SITagliptin (JANUVIA) 100 MG tablet, Take 1 tablet by mouth Daily., Disp: 90 tablet, Rfl: 1    Review of Systems  Review of Systems   Constitutional: Positive for fatigue. Negative for chills, fever and unexpected weight change.        Feeling poorly   HENT: Negative for ear pain, hearing loss, nosebleeds, rhinorrhea and sore throat.    Eyes: Negative for pain, discharge, redness and itching.   Respiratory: Negative for cough, shortness of breath and wheezing.    Cardiovascular: Negative for chest pain, palpitations and leg swelling.   Gastrointestinal: Negative for abdominal pain, blood in stool and constipation.    Endocrine: Negative for cold intolerance, heat intolerance and polydipsia.   Genitourinary: Negative for dysuria, frequency, hematuria, pelvic pain, vaginal bleeding and vaginal discharge.   Musculoskeletal: Positive for arthralgias. Negative for gait problem, joint swelling and myalgias.   Skin: Negative for rash.   Allergic/Immunologic: Negative.    Neurological: Negative for dizziness, syncope, weakness, numbness and headaches.   Hematological: Negative for adenopathy. Does not bruise/bleed easily.   Psychiatric/Behavioral: Negative for sleep disturbance and suicidal ideas. The patient is not nervous/anxious.         Physical Exam    /64   Pulse 64   Wt 72.9 kg (160 lb 12.8 oz)   LMP  (LMP Unknown) Comment: scheduled for pap JEREMIE Watson in [place]  SpO2 98%   BMI 30.38 kg/m² Body mass index is 30.38 kg/m².  Physical Exam   Constitutional: She is oriented to person, place, and time. She appears well-developed. No distress.   HENT:   Head: Normocephalic.   Right Ear: External ear normal.   Left Ear: External ear normal.   Mouth/Throat: No oropharyngeal exudate.   Eyes: Conjunctivae and lids are normal. Right eye exhibits no discharge. Left eye exhibits no discharge. Right pupil is reactive. Left pupil is reactive.   Neck: No JVD present. No tracheal deviation present. No thyroid mass and no thyromegaly present.   Cardiovascular: Normal rate, regular rhythm, normal heart sounds and intact distal pulses.   No murmur heard.  Pulmonary/Chest: Effort normal and breath sounds normal. No respiratory distress. She has no wheezes.   Abdominal: Soft. Bowel sounds are normal. There is no tenderness.   Musculoskeletal: She exhibits no edema or tenderness.   Lymphadenopathy:     She has no cervical adenopathy.   Neurological: She is alert and oriented to person, place, and time.   Skin: Skin is warm, dry and intact. No rash noted. She is not diaphoretic. No erythema.   Psychiatric: She has a normal mood and  affect. Her speech is normal and behavior is normal. Thought content normal.       Labs and Imaging   Lab Results   Component Value Date    HGBA1C 7.6 03/11/2020    HGBA1C 6.6 12/11/2019    HGBA1C 437 09/24/2019     Office Visit on 03/11/2020   Component Date Value Ref Range Status   • Glucose 03/11/2020 151* 70 - 130 mg/dL Final   • Hemoglobin A1C 03/11/2020 7.6  % Final     No images are attached to the encounter or orders placed in the encounter.  No Images in the past 120 days found..    Assessment / Plan   Zeinab was seen today for follow-up.    Diagnoses and all orders for this visit:    Uncontrolled type 2 diabetes mellitus with hyperglycemia (CMS/[facility])  -     POC Glucose Fingerstick  -     POC Glycosylated Hemoglobin (Hb A1C)  -     Empagliflozin (JARDIANCE) 10 MG tablet; Take 10 mg by mouth Daily.        Diabetes Mellitus 2 is under inadequate control.  -A1c 7.6,  Up from 6.6 last visit  -continue metformin ER 500mg 2 pills 2x/day.  -continue Jardiance 10mg daily.   -increase basaglar by 5u q3 days with goal FBS <150 at least   -Continue Januvia 100mg daily, samples provided. She just picked up a 90-day supply. Will plan to change Januvia and basaglar to soliqua next visit.   -Continue glipizide 10mg 2x/day. Make sure to take this medicine before you eat.   -discussed watching carb intake, continue exercise    1.  Diet: 3-4 carb servings per meal for females, 4-5 carb servings per meal for males  Spread carb intake throughout the day  Increase lean protein and vegetable intake  Avoid sugary drinks and processed carbs including crackers, cookies, cakes  2.  Exercise: Recommend at least 30 minutes of exercise daily, at least 5 days per week. Increase exercise gradually.   3.  Blood Glucose Goal: Blood glucose goal <150 fasting, <180 2 hr postprandial  4.  Microalbumin due 6/2020  5.  Education performed during this visit: long term diabetic complications discussed. , annual eye examinations at Ophthalmology  discussed, dental hygiene discussed  and foot care reviewed., home glucose monitoring emphasized, all medications, side effects and compliance discussed carefully and Hypoglycemia management and prevention reviewed. Reviewed ‘ABCs’ of diabetes management (respective goals in parentheses):  A1C (<7), blood pressure (<130/80), and cholesterol (LDL <100, if CVD <70).    Patient Instructions   Increase basaglar by 5 units every 3 days, up to 50 units daily. You're looking for fasting blood sugar readings consistently <150  Continue glipizide, Januvia, Jardiance, and metformin       Follow up: Return in about 3 months (around 6/11/2020).    Discussed the nature of the disease including, risks, complications, implications, management, safe and proper use of medications. Encouraged therapeutic lifestyle changes including low calorie diet with plenty of fruits and vegetables, daily exercise, medication compliance, and keeping scheduled follow up appointments with me and any other providers. Encouraged patient to have appointment for complete physical, fasting labs, appropriate screenings, and immunizations on an annual basis.      Rosario Claudio PA-C

## 2020-04-02 DIAGNOSIS — E11.9 TYPE 2 DIABETES MELLITUS WITHOUT COMPLICATION (HCC): ICD-10-CM

## 2020-04-02 DIAGNOSIS — E11.65 UNCONTROLLED TYPE 2 DIABETES MELLITUS WITH HYPERGLYCEMIA (HCC): ICD-10-CM

## 2020-04-02 RX ORDER — INSULIN GLARGINE 100 [IU]/ML
INJECTION, SOLUTION SUBCUTANEOUS
Qty: 5 PEN | Refills: 6 | Status: SHIPPED | OUTPATIENT
Start: 2020-04-02 | End: 2020-05-29 | Stop reason: SDUPTHER

## 2020-04-02 NOTE — TELEPHONE ENCOUNTER
PATIENT NEEDS MODIFICATION TO THIS PRESCRIPTION. [facility] HAS INCREASED PATIENTS DOSAGES AND PHARMACY ADVISED HER TO CONTACT US TO CHANGE THE PRESCRIPTION SO THEY CAN GIVE HER THE RIGHT AMOUNT PATIENT NEEDS. SHE ALSO NEEDS NEEDLES REFILLED FOR THIS MEDICATION AS WELL.

## 2020-04-03 DIAGNOSIS — E11.65 UNCONTROLLED TYPE 2 DIABETES MELLITUS WITH HYPERGLYCEMIA (HCC): ICD-10-CM

## 2020-04-13 DIAGNOSIS — E11.65 UNCONTROLLED TYPE 2 DIABETES MELLITUS WITH HYPERGLYCEMIA (HCC): ICD-10-CM

## 2020-04-23 ENCOUNTER — TELEPHONE (OUTPATIENT)
Dept: ENDOCRINOLOGY | Facility: CLINIC | Age: 65
End: 2020-04-23

## 2020-04-23 DIAGNOSIS — E11.65 UNCONTROLLED TYPE 2 DIABETES MELLITUS WITH HYPERGLYCEMIA (HCC): ICD-10-CM

## 2020-04-23 RX ORDER — METFORMIN HYDROCHLORIDE 500 MG/1
1000 TABLET, EXTENDED RELEASE ORAL 2 TIMES DAILY
Qty: 120 TABLET | Refills: 6 | Status: SHIPPED | OUTPATIENT
Start: 2020-04-23 | End: 2020-09-10 | Stop reason: SDUPTHER

## 2020-04-23 NOTE — TELEPHONE ENCOUNTER
PLEASE CALL IN FOR PT HER METFORMIN    PT USES [facility]    PTS NUMBER IF YOU HAVE QUESTIONS  [phone]

## 2020-05-29 DIAGNOSIS — E11.65 UNCONTROLLED TYPE 2 DIABETES MELLITUS WITH HYPERGLYCEMIA (HCC): ICD-10-CM

## 2020-05-29 RX ORDER — INSULIN GLARGINE 100 [IU]/ML
INJECTION, SOLUTION SUBCUTANEOUS
Qty: 5 PEN | Refills: 6 | Status: SHIPPED | OUTPATIENT
Start: 2020-05-29 | End: 2020-09-16 | Stop reason: SDUPTHER

## 2020-06-15 ENCOUNTER — OFFICE VISIT (OUTPATIENT)
Dept: ENDOCRINOLOGY | Facility: CLINIC | Age: 65
End: 2020-06-15

## 2020-06-15 VITALS
OXYGEN SATURATION: 98 % | BODY MASS INDEX: 30.99 KG/M2 | HEART RATE: 71 BPM | DIASTOLIC BLOOD PRESSURE: 80 MMHG | WEIGHT: 164 LBS | SYSTOLIC BLOOD PRESSURE: 124 MMHG

## 2020-06-15 DIAGNOSIS — Z79.4 CONTROLLED TYPE 2 DIABETES MELLITUS WITHOUT COMPLICATION, WITH LONG-TERM CURRENT USE OF INSULIN (HCC): Primary | ICD-10-CM

## 2020-06-15 DIAGNOSIS — E11.9 CONTROLLED TYPE 2 DIABETES MELLITUS WITHOUT COMPLICATION, WITH LONG-TERM CURRENT USE OF INSULIN (HCC): Primary | ICD-10-CM

## 2020-06-15 LAB
GLUCOSE BLDC GLUCOMTR-MCNC: 114 MG/DL (ref 70–130)
HBA1C MFR BLD: 6.4 %

## 2020-06-15 PROCEDURE — 83036 HEMOGLOBIN GLYCOSYLATED A1C: CPT | Performed by: PHYSICIAN ASSISTANT

## 2020-06-15 PROCEDURE — 82947 ASSAY GLUCOSE BLOOD QUANT: CPT | Performed by: PHYSICIAN ASSISTANT

## 2020-06-15 PROCEDURE — 99214 OFFICE O/P EST MOD 30 MIN: CPT | Performed by: PHYSICIAN ASSISTANT

## 2020-06-15 RX ORDER — GLIPIZIDE 10 MG/1
TABLET ORAL
Qty: 180 TABLET | Refills: 1 | Status: SHIPPED | OUTPATIENT
Start: 2020-06-15 | End: 2021-01-05 | Stop reason: SDUPTHER

## 2020-06-15 NOTE — PROGRESS NOTES
"Chief Complaint  F/u for Diabetes Mellitus.    HPI   Zeinab Rodriguez is a 64 y.o. female who is here today for f/u of Diabetes Mellitus type 2. The initial diagnosis of diabetes was made approximately 2009.     Pt reports to be doing well.     A1C- 6.4 (6/15/2020), 7.6 (3/11/2020), 6.6 (12/11/19), 9.9 (9/24/19), 9.5 (6/6/19)    Diabetic complications: peripheral neuropathy- tingling  Eye exam current (within one year): utd  Foot care and dental care: discussed    Current diabetic medications include:  Metformin ER 500mg 2 tab BID - tolerating better than immediate release  Glipizide 10mg BID  Januvia 100mg QD - expensive   Jardiance 10mg daily  Basaglar 45U QHS    Statin: lipitor 20    Past medications: none    Diabetic Monitoring  -   BS log reviewed- checking fasting, most readings ~130-150. Pt denies hypoglycemia    Nutrition:     Current diet: in general, an \"unhealthy\" diet, on average, 3 meals per day  Current exercise: none    The following portions of the patient's history were reviewed and updated by me as appropriate: allergies, current medications, past family history, past social history, past surgical history and problem list.      Past Medical History:   Diagnosis Date   • Anxiety    • Breast cancer (CMS/HCC)    • Depression    • Diabetes mellitus (CMS/[facility])    • Glaucoma    • Hypertension    • Myopic degeneration        Medications    Current Outpatient Medications:   •  amLODIPine (NORVASC) 5 MG tablet, Take 1 tablet by mouth Daily., Disp: 90 tablet, Rfl: 1  •  aspirin 81 MG tablet, Take  by mouth daily., Disp: , Rfl:   •  atenolol (TENORMIN) 100 MG tablet, Take 1 tablet by mouth Daily., Disp: 90 tablet, Rfl: 1  •  atorvastatin (LIPITOR) 20 MG tablet, Take 1 tablet by mouth Every Night., Disp: 90 tablet, Rfl: 1  •  CILOXAN 0.3 % ophthalmic ointment, , Disp: , Rfl:   •  dorzolamide-timolol (COSOPT) 22.3-6.8 MG/ML ophthalmic solution, Apply  to eye 2 (two) times a day., Disp: , Rfl:   •  Empagliflozin " (Jardiance) 10 MG tablet, Take 10 mg by mouth Daily., Disp: 90 tablet, Rfl: 1  •  glipizide (GLUCOTROL) 10 MG tablet, Take one tablet by mouth twice daily before food, Disp: 180 tablet, Rfl: 1  •  glucose blood (TRUE METRIX BLOOD GLUCOSE TEST) test strip, Use to test blood glucose once daily and as needed, Disp: 100 each, Rfl: 3  •  hydroCHLOROthiazide (HYDRODIURIL) 25 MG tablet, Take 1 tablet by mouth Daily., Disp: 90 tablet, Rfl: 1  •  Insulin Glargine (BASAGLAR KWIKPEN) 100 UNIT/ML injection pen, Increase basaglar up to 50u daily as directed, Disp: 5 pen, Rfl: 6  •  Insulin Pen Needle 32G X 4 MM misc, Use daily with insulin, Disp: 50 each, Rfl: 11  •  Lancets misc, USE TO TEST ONCE DAILY AND AS NEEDED, Disp: 100 each, Rfl: 3  •  latanoprost (XALATAN) 0.005 % ophthalmic solution, , Disp: , Rfl:   •  lisinopril (PRINIVIL,ZESTRIL) 40 MG tablet, Take 1 tablet by mouth Daily., Disp: 90 tablet, Rfl: 1  •  metFORMIN ER (GLUCOPHAGE-XR) 500 MG 24 hr tablet, Take 2 tablets by mouth 2 (Two) Times a Day., Disp: 120 tablet, Rfl: 6  •  metroNIDAZOLE (METROGEL) 1 % gel, Apply  topically Daily., Disp: 60 g, Rfl: 3  •  Multiple Vitamins-Minerals (ICAPS AREDS FORMULA PO), Take  by mouth 2 (two) times a day., Disp: , Rfl:   •  PARoxetine (PAXIL) 20 MG tablet, TAKE (1) TABLET DAILY IN THE MORNING., Disp: 90 tablet, Rfl: 1  •  SITagliptin (JANUVIA) 100 MG tablet, Take 1 tablet by mouth Daily., Disp: 90 tablet, Rfl: 1    Review of Systems  Review of Systems   Constitutional: Positive for fatigue. Negative for chills, fever and unexpected weight change.        Feeling poorly   HENT: Negative for ear pain, hearing loss, nosebleeds, rhinorrhea and sore throat.    Eyes: Negative for pain, discharge, redness and itching.   Respiratory: Negative for cough, shortness of breath and wheezing.    Cardiovascular: Negative for chest pain, palpitations and leg swelling.   Gastrointestinal: Negative for abdominal pain, blood in stool and  constipation.   Endocrine: Negative for cold intolerance, heat intolerance and polydipsia.   Genitourinary: Negative for dysuria, frequency, hematuria, pelvic pain, vaginal bleeding and vaginal discharge.   Musculoskeletal: Positive for arthralgias. Negative for gait problem, joint swelling and myalgias.   Skin: Negative for rash.   Allergic/Immunologic: Negative.    Neurological: Negative for dizziness, syncope, weakness, numbness and headaches.   Hematological: Negative for adenopathy. Does not bruise/bleed easily.   Psychiatric/Behavioral: Negative for sleep disturbance and suicidal ideas. The patient is not nervous/anxious.         Physical Exam    /80   Pulse 71   Wt 74.4 kg (164 lb)   LMP  (LMP Unknown) Comment: scheduled for pap JEREMIE Watson in [place]  SpO2 98%   BMI 30.99 kg/m² Body mass index is 30.99 kg/m².  Physical Exam   Constitutional: She is oriented to person, place, and time. She appears well-developed. No distress.   HENT:   Head: Normocephalic.   Right Ear: External ear normal.   Left Ear: External ear normal.   Mouth/Throat: No oropharyngeal exudate.   Eyes: Conjunctivae and lids are normal. Right eye exhibits no discharge. Left eye exhibits no discharge. Right pupil is reactive. Left pupil is reactive.   Neck: No JVD present. No tracheal deviation present. No thyroid mass and no thyromegaly present.   Cardiovascular: Normal rate, regular rhythm, normal heart sounds and intact distal pulses.   No murmur heard.  Pulmonary/Chest: Effort normal and breath sounds normal. No respiratory distress. She has no wheezes.   Abdominal: Soft. Bowel sounds are normal. There is no tenderness.   Musculoskeletal: She exhibits no edema or tenderness.   Lymphadenopathy:     She has no cervical adenopathy.   Neurological: She is alert and oriented to person, place, and time.   Skin: Skin is warm, dry and intact. No rash noted. She is not diaphoretic. No erythema.   Psychiatric: She has a normal mood  and affect. Her speech is normal and behavior is normal. Thought content normal.       Labs and Imaging   Lab Results   Component Value Date    HGBA1C 6.4 06/15/2020    HGBA1C 7.6 03/11/2020    HGBA1C 6.6 12/11/2019     Office Visit on 06/15/2020   Component Date Value Ref Range Status   • Glucose 06/15/2020 114  70 - 130 mg/dL Final   • Hemoglobin A1C 06/15/2020 6.4  % Final     No images are attached to the encounter or orders placed in the encounter.  No Images in the past 120 days found..    Assessment / Plan   Zeinab was seen today for follow-up.    Diagnoses and all orders for this visit:    Controlled type 2 diabetes mellitus without complication, with long-term current use of insulin (CMS/[facility])  -     POC Glucose Fingerstick  -     POC Glycosylated Hemoglobin (Hb A1C)  -     Microalbumin / Creatinine Urine Ratio - Urine, Clean Catch  -     glipizide (GLUCOTROL) 10 MG tablet; Take one tablet by mouth twice daily before food        Diabetes Mellitus 2 is under good control.  -A1c 6.4, down from 7.6 6.6 last visit  -continue metformin ER 500mg 2 pills 2x/day.  -continue Jardiance 10mg daily.   -continue basaglar 45u qHS  -Continue Januvia 100mg daily  -consider change basaglar and januvia to soliqua in the future  -Continue glipizide 10mg 2x/day. Make sure to take this medicine before you eat.   -discussed watching carb intake, continue exercise    1.  Diet: 3-4 carb servings per meal for females, 4-5 carb servings per meal for males  Spread carb intake throughout the day  Increase lean protein and vegetable intake  Avoid sugary drinks and processed carbs including crackers, cookies, cakes  2.  Exercise: Recommend at least 30 minutes of exercise daily, at least 5 days per week. Increase exercise gradually.   3.  Blood Glucose Goal: Blood glucose goal <150 fasting, <180 2 hr postprandial  4.  Microalbumin due 6/2020  5.  Education performed during this visit: long term diabetic complications discussed. , annual  eye examinations at Ophthalmology discussed, dental hygiene discussed  and foot care reviewed., home glucose monitoring emphasized, all medications, side effects and compliance discussed carefully and Hypoglycemia management and prevention reviewed. Reviewed ‘ABCs’ of diabetes management (respective goals in parentheses):  A1C (<7), blood pressure (<130/80), and cholesterol (LDL <100, if CVD <70).    There are no Patient Instructions on file for this visit.    Follow up: Return in about 3 months (around 9/15/2020).    Discussed the nature of the disease including, risks, complications, implications, management, safe and proper use of medications. Encouraged therapeutic lifestyle changes including low calorie diet with plenty of fruits and vegetables, daily exercise, medication compliance, and keeping scheduled follow up appointments with me and any other providers. Encouraged patient to have appointment for complete physical, fasting labs, appropriate screenings, and immunizations on an annual basis.      Rosario Claudio PA-C

## 2020-07-07 RX ORDER — LISINOPRIL 40 MG/1
40 TABLET ORAL DAILY
Qty: 90 TABLET | Refills: 1 | Status: SHIPPED | OUTPATIENT
Start: 2020-07-07 | End: 2021-01-05 | Stop reason: SDUPTHER

## 2020-07-07 RX ORDER — AMLODIPINE BESYLATE 5 MG/1
5 TABLET ORAL DAILY
Qty: 90 TABLET | Refills: 1 | Status: SHIPPED | OUTPATIENT
Start: 2020-07-07 | End: 2021-01-05 | Stop reason: SDUPTHER

## 2020-07-07 RX ORDER — HYDROCHLOROTHIAZIDE 25 MG/1
25 TABLET ORAL DAILY
Qty: 90 TABLET | Refills: 1 | Status: SHIPPED | OUTPATIENT
Start: 2020-07-07 | End: 2021-01-05 | Stop reason: SDUPTHER

## 2020-07-23 ENCOUNTER — OFFICE VISIT (OUTPATIENT)
Dept: INTERNAL MEDICINE | Facility: CLINIC | Age: 65
End: 2020-07-23

## 2020-07-23 VITALS
HEART RATE: 68 BPM | TEMPERATURE: 97.7 F | SYSTOLIC BLOOD PRESSURE: 122 MMHG | DIASTOLIC BLOOD PRESSURE: 60 MMHG | RESPIRATION RATE: 20 BRPM | BODY MASS INDEX: 30.99 KG/M2 | WEIGHT: 164 LBS

## 2020-07-23 DIAGNOSIS — E11.9 TYPE 2 DIABETES MELLITUS WITHOUT COMPLICATION, WITHOUT LONG-TERM CURRENT USE OF INSULIN (HCC): ICD-10-CM

## 2020-07-23 DIAGNOSIS — I10 ESSENTIAL HYPERTENSION: ICD-10-CM

## 2020-07-23 DIAGNOSIS — E78.5 HYPERLIPIDEMIA, UNSPECIFIED HYPERLIPIDEMIA TYPE: ICD-10-CM

## 2020-07-23 DIAGNOSIS — Z00.00 MEDICARE ANNUAL WELLNESS VISIT, INITIAL: Primary | ICD-10-CM

## 2020-07-23 LAB
ALBUMIN/CREATININE RATIO, URINE: 30
BILIRUB BLD-MCNC: NEGATIVE MG/DL
CLARITY, POC: CLEAR
COLOR UR: YELLOW
EXPIRATION DATE: ABNORMAL
EXPIRATION DATE: NORMAL
GLUCOSE UR STRIP-MCNC: ABNORMAL MG/DL
KETONES UR QL: NEGATIVE
LEUKOCYTE EST, POC: NEGATIVE
Lab: 1061
Lab: ABNORMAL
NITRITE UR-MCNC: NEGATIVE MG/ML
PH UR: 5 [PH] (ref 5–8)
POC CREATININE URINE: 50
POC MICROALBUMIN URINE: 10
PROT UR STRIP-MCNC: NEGATIVE MG/DL
RBC # UR STRIP: NEGATIVE /UL
SP GR UR: 1 (ref 1–1.03)
UROBILINOGEN UR QL: NORMAL

## 2020-07-23 PROCEDURE — 81003 URINALYSIS AUTO W/O SCOPE: CPT | Performed by: INTERNAL MEDICINE

## 2020-07-23 PROCEDURE — 36415 COLL VENOUS BLD VENIPUNCTURE: CPT | Performed by: INTERNAL MEDICINE

## 2020-07-23 PROCEDURE — G0438 PPPS, INITIAL VISIT: HCPCS | Performed by: INTERNAL MEDICINE

## 2020-07-23 PROCEDURE — 96160 PT-FOCUSED HLTH RISK ASSMT: CPT | Performed by: INTERNAL MEDICINE

## 2020-07-23 PROCEDURE — 82044 UR ALBUMIN SEMIQUANTITATIVE: CPT | Performed by: INTERNAL MEDICINE

## 2020-07-23 NOTE — PATIENT INSTRUCTIONS
Medicare Wellness  Personal Prevention Plan of Service     Date of Office Visit:  2020  Encounter Provider:  Janes Almazan MD  Place of Service:  [facility]  Patient Name: Zeinab Rodriguez  :  1955    As part of the Medicare Wellness portion of your visit today, we are providing you with this personalized preventive plan of services (PPPS). This plan is based upon recommendations of the [place] Preventive Services Task Force (USPSTF) and the Advisory Committee on Immunization Practices (ACIP).    This lists the preventive care services that should be considered, and provides dates of when you are due. Items listed as completed are up-to-date and do not require any further intervention.    Health Maintenance   Topic Date Due   • TDAP/TD VACCINES (1 - Tdap) 1965   • ZOSTER VACCINE (1 of 2) 2004   • MEDICARE ANNUAL WELLNESS  2016   • Pneumococcal Vaccine Once at 65 Years Old  2019   • URINE MICROALBUMIN  2020   • INFLUENZA VACCINE  2020   • DIABETIC FOOT EXAM  2020   • DIABETIC EYE EXAM  10/17/2020   • LIPID PANEL  2020   • HEMOGLOBIN A1C  12/15/2020   • MAMMOGRAM  2021   • COLONOSCOPY  10/07/2023   • HEPATITIS C SCREENING  Completed       No orders of the defined types were placed in this encounter.      Return in about 6 months (around 2021) for Follow-up.          Advance Care Planning and Advance Directives     You make decisions on a daily basis - decisions about where you want to live, your career, your home, your life. Perhaps one of the most important decisions you face is your choice for future medical care. Take time to talk with your family and your healthcare team and start planning today.  Advance Care Planning is a process that can help you:  · Understand possible future healthcare decisions in light of your own experiences  · Reflect on those decision in light of your  goals and values  · Discuss your decisions with those closest to you and the healthcare professionals that care for you  · Make a plan by creating a document that reflects your wishes    Surrogate Decision Maker  In the event of a medical emergency, which has left you unable to communicate or to make your own decisions, you would need someone to make decisions for you.  It is important to discuss your preferences for medical treatment with this person while you are in good health.     Qualities of a surrogate decision maker:  • Willing to take on this role and responsibility  • Knows what you want for future medical care  • Willing to follow your wishes even if they don't agree with them  • Able to make difficult medical decisions under stressful circumstances    Advance Directives  These are legal documents you can create that will guide your healthcare team and decision maker(s) when needed. These documents can be stored in the electronic medical record.    · Living Will - a legal document to guide your care if you have a terminal condition or a serious illness and are unable to communicate. States vary by statute in document names/types, but most forms may include one or more of the following:        -  Directions regarding life-prolonging treatments        -  Directions regarding artificially provided nutrition/hydration        -  Choosing a healthcare decision maker        -  Direction regarding organ/tissue donation    · Durable Power of  for Healthcare - this document names an -in-fact to make medical decisions for you, but it may also allow this person to make personal and financial decisions for you. Please seek the advice of an  if you need this type of document.    **Advance Directives are not required and no one may discriminate against you if you do not sign one.    Medical Orders  Many states allow specific forms/orders signed by your physician to record your wishes for medical  treatment in your current state of health. This form, signed in personal communication with your physician, addresses resuscitation and other medical interventions that you may or may not want.      For more information or to schedule a time with a [facility] Advance Care Planning Facilitator contact: [facility] or call [phone] and someone will contact you directly.    Fall Prevention in the Home, Adult  Falls can cause injuries. They can happen to people of all ages. There are many things you can do to make your home safe and to help prevent falls. Ask for help when making these changes, if needed.  What actions can I take to prevent falls?  General Instructions  · Use good lighting in all rooms. Replace any light bulbs that burn out.  · Turn on the lights when you go into a dark area. Use night-lights.  · Keep items that you use often in easy-to-reach places. Lower the shelves around your home if necessary.  · Set up your furniture so you have a clear path. Avoid moving your furniture around.  · Do not have throw rugs and other things on the floor that can make you trip.  · Avoid walking on wet floors.  · If any of your floors are uneven, fix them.  · Add color or contrast paint or tape to clearly neftali and help you see:  ? Any grab bars or handrails.  ? First and last steps of stairways.  ? Where the edge of each step is.  · If you use a stepladder:  ? Make sure that it is fully opened. Do not climb a closed stepladder.  ? Make sure that both sides of the stepladder are locked into place.  ? Ask someone to hold the stepladder for you while you use it.  · If there are any pets around you, be aware of where they are.  What can I do in the bathroom?         · Keep the floor dry. Clean up any water that spills onto the floor as soon as it happens.  · Remove soap buildup in the tub or shower regularly.  · Use non-skid mats or decals on the floor of the tub or shower.  · Attach bath mats securely  with double-sided, non-slip rug tape.  · If you need to sit down in the shower, use a plastic, non-slip stool.  · Install grab bars by the toilet and in the tub and shower. Do not use towel bars as grab bars.  What can I do in the bedroom?  · Make sure that you have a light by your bed that is easy to reach.  · Do not use any sheets or blankets that are too big for your bed. They should not hang down onto the floor.  · Have a firm chair that has side arms. You can use this for support while you get dressed.  What can I do in the kitchen?  · Clean up any spills right away.  · If you need to reach something above you, use a strong step stool that has a grab bar.  · Keep electrical cords out of the way.  · Do not use floor polish or wax that makes floors slippery. If you must use wax, use non-skid floor wax.  What can I do with my stairs?  · Do not leave any items on the stairs.  · Make sure that you have a light switch at the top of the stairs and the bottom of the stairs. If you do not have them, ask someone to add them for you.  · Make sure that there are handrails on both sides of the stairs, and use them. Fix handrails that are broken or loose. Make sure that handrails are as long as the stairways.  · Install non-slip stair treads on all stairs in your home.  · Avoid having throw rugs at the top or bottom of the stairs. If you do have throw rugs, attach them to the floor with carpet tape.  · Choose a carpet that does not hide the edge of the steps on the stairway.  · Check any carpeting to make sure that it is firmly attached to the stairs. Fix any carpet that is loose or worn.  What can I do on the outside of my home?  · Use bright outdoor lighting.  · Regularly fix the edges of walkways and driveways and fix any cracks.  · Remove anything that might make you trip as you walk through a door, such as a raised step or threshold.  · Trim any bushes or trees on the path to your home.  · Regularly check to see if  handrails are loose or broken. Make sure that both sides of any steps have handrails.  · Install guardrails along the edges of any raised decks and porches.  · Clear walking paths of anything that might make someone trip, such as tools or rocks.  · Have any leaves, snow, or ice cleared regularly.  · Use sand or salt on walking paths during winter.  · Clean up any spills in your garage right away. This includes grease or oil spills.  What other actions can I take?  · Wear shoes that:  ? Have a low heel. Do not wear high heels.  ? Have rubber bottoms.  ? Are comfortable and fit you well.  ? Are closed at the toe. Do not wear open-toe sandals.  · Use tools that help you move around (mobility aids) if they are needed. These include:  ? Canes.  ? Walkers.  ? Scooters.  ? Crutches.  · Review your medicines with your doctor. Some medicines can make you feel dizzy. This can increase your chance of falling.  Ask your doctor what other things you can do to help prevent falls.  Where to find more information  · Centers for Disease Control and Prevention, [place]AD[place]: https://cdc.gov  · National [place] on Aging: https://qm1clec.deshaun.nih.gov  Contact a doctor if:  · You are afraid of falling at home.  · You feel weak, drowsy, or dizzy at home.  · You fall at home.  Summary  · There are many simple things that you can do to make your home safe and to help prevent falls.  · Ways to make your home safe include removing tripping hazards and installing grab bars in the bathroom.  · Ask for help when making these changes in your home.  This information is not intended to replace advice given to you by your health care provider. Make sure you discuss any questions you have with your health care provider.  Document Released: 10/14/2010 Document Revised: 04/09/2020 Document Reviewed: 08/02/2018  Elsevier Patient Education © 2020 Elsevier Inc.      Heart-Healthy Eating Plan  Heart-healthy meal planning includes:  · Eating less unhealthy  fats.  · Eating more healthy fats.  · Making other changes in your diet.  Talk with your doctor or a diet specialist (dietitian) to create an eating plan that is right for you.  What is my plan?  Your doctor may recommend an eating plan that includes:  · Total fat: ______% or less of total calories a day.  · Saturated fat: ______% or less of total calories a day.  · Cholesterol: less than _________mg a day.  What are tips for following this plan?  Cooking  Avoid frying your food. Try to bake, boil, grill, or broil it instead. You can also reduce fat by:  · Removing the skin from poultry.  · Removing all visible fats from meats.  · Steaming vegetables in water or broth.  Meal planning    · At meals, divide your plate into four equal parts:  ? Fill one-half of your plate with vegetables and green salads.  ? Fill one-fourth of your plate with whole grains.  ? Fill one-fourth of your plate with lean protein foods.  · Eat 4-5 servings of vegetables per day. A serving of vegetables is:  ? 1 cup of raw or cooked vegetables.  ? 2 cups of raw leafy greens.  · Eat 4-5 servings of fruit per day. A serving of fruit is:  ? 1 medium whole fruit.  ? ¼ cup of dried fruit.  ? ½ cup of fresh, frozen, or canned fruit.  ? ½ cup of 100% fruit juice.  · Eat more foods that have soluble fiber. These are apples, broccoli, carrots, beans, peas, and barley. Try to get 20-30 g of fiber per day.  · Eat 4-5 servings of nuts, legumes, and seeds per week:  ? 1 serving of dried beans or legumes equals ½ cup after being cooked.  ? 1 serving of nuts is ¼ cup.  ? 1 serving of seeds equals 1 tablespoon.  General information  · Eat more home-cooked food. Eat less restaurant, buffet, and fast food.  · Limit or avoid alcohol.  · Limit foods that are high in starch and sugar.  · Avoid fried foods.  · Lose weight if you are overweight.  · Keep track of how much salt (sodium) you eat. This is important if you have high blood pressure. Ask your doctor to  tell you more about this.  · Try to add vegetarian meals each week.  Fats  · Choose healthy fats. These include olive oil and canola oil, flaxseeds, walnuts, almonds, and seeds.  · Eat more omega-3 fats. These include salmon, mackerel, sardines, tuna, flaxseed oil, and ground flaxseeds. Try to eat fish at least 2 times each week.  · Check food labels. Avoid foods with trans fats or high amounts of saturated fat.  · Limit saturated fats.  ? These are often found in animal products, such as meats, butter, and cream.  ? These are also found in plant foods, such as palm oil, palm kernel oil, and coconut oil.  · Avoid foods with partially hydrogenated oils in them. These have trans fats. Examples are stick margarine, some tub margarines, cookies, crackers, and other baked goods.  What foods can I eat?  Fruits  All fresh, canned (in natural juice), or frozen fruits.  Vegetables  Fresh or frozen vegetables (raw, steamed, roasted, or grilled). Green salads.  Grains  Most grains. Choose whole wheat and whole grains most of the time. Rice and pasta, including brown rice and pastas made with whole wheat.  Meats and other proteins  Lean, well-trimmed beef, veal, pork, and lamb. Chicken and turkey without skin. All fish and shellfish. Wild duck, rabbit, pheasant, and venison. Egg whites or low-cholesterol egg substitutes. Dried beans, peas, lentils, and tofu. Seeds and most nuts.  Dairy  Low-fat or nonfat cheeses, including ricotta and mozzarella. Skim or 1% milk that is liquid, powdered, or evaporated. Buttermilk that is made with low-fat milk. Nonfat or low-fat yogurt.  Fats and oils  Non-hydrogenated (trans-free) margarines. Vegetable oils, including soybean, sesame, sunflower, olive, peanut, safflower, corn, canola, and cottonseed. Salad dressings or mayonnaise made with a vegetable oil.  Beverages  Mineral water. Coffee and tea. Diet carbonated beverages.  Sweets and desserts  Sherbet, gelatin, and fruit ice. Small amounts  of dark chocolate.  Limit all sweets and desserts.  Seasonings and condiments  All seasonings and condiments.  The items listed above may not be a complete list of foods and drinks you can eat. Contact a dietitian for more options.  What foods should I avoid?  Fruits  Canned fruit in heavy syrup. Fruit in cream or butter sauce. Fried fruit. Limit coconut.  Vegetables  Vegetables cooked in cheese, cream, or butter sauce. Fried vegetables.  Grains  Breads that are made with saturated or trans fats, oils, or whole milk. Croissants. Sweet rolls. Donuts. High-fat crackers, such as cheese crackers.  Meats and other proteins  Fatty meats, such as hot dogs, ribs, sausage, weaver, rib-eye roast or steak. High-fat deli meats, such as salami and bologna. Caviar. Domestic duck and goose. Organ meats, such as liver.  Dairy  Cream, sour cream, cream cheese, and creamed cottage cheese. Whole-milk cheeses. Whole or 2% milk that is liquid, evaporated, or condensed. Whole buttermilk. Cream sauce or high-fat cheese sauce. Yogurt that is made from whole milk.  Fats and oils  Meat fat, or shortening. Cocoa butter, hydrogenated oils, palm oil, coconut oil, palm kernel oil. Solid fats and shortenings, including weaver fat, salt pork, lard, and butter. Nondairy cream substitutes. Salad dressings with cheese or sour cream.  Beverages  Regular sodas and juice drinks with added sugar.  Sweets and desserts  Frosting. Pudding. Cookies. Cakes. Pies. Milk chocolate or white chocolate. Buttered syrups. Full-fat ice cream or ice cream drinks.  The items listed above may not be a complete list of foods and drinks to avoid. Contact a dietitian for more information.  Summary  · Heart-healthy meal planning includes eating less unhealthy fats, eating more healthy fats, and making other changes in your diet.  · Eat a balanced diet. This includes fruits and vegetables, low-fat or nonfat dairy, lean protein, nuts and legumes, whole grains, and heart-healthy  oils and fats.  This information is not intended to replace advice given to you by your health care provider. Make sure you discuss any questions you have with your health care provider.  Document Released: 06/18/2013 Document Revised: 02/21/2019 Document Reviewed: 01/25/2019  Elsevier Patient Education © 2020 Elsevier Inc.

## 2020-07-23 NOTE — PROGRESS NOTES
Chief Complaint   Patient presents with   • Follow-up     6 month follow up chronic medical problems   • Medicare Wellness-Initial Visit       History of Present Illness    The patient presents for a follow-up related to hypertension. The patient reports that she has had no headaches, chest pain, dyspnea, edema, syncope, blurred vision or palpitations. She states that she is taking her medication as prescribed. She is not having medication side effects.    The patient presents for a follow-up related to hyperlipidemia. She is not following a low fat diet. She reports that she is not exercising. She is taking atorvastatin. The patient is taking her medication as prescribed. She reports no medication side effects, including muscle cramps, abdominal pain, headaches or weakness. She denies orthopnea, paroxysmal nocturnal dyspnea or dyspnea on exertion.    The patient presents for a follow-up related to Type 2 Diabetes Mellitus. She checks her blood sugars at home. Her sugars are checked daily. The average sugars are in the  range. She reports hypoglycemic symptoms. Her hypoglycemia is manifested by lightheadedness and tremor. The hypoglycemia occurs infrequently. The patient denies polyuria, polydypsia or polyphagia. She reports tingling in her feet but denies numbness or pain. The neuropathy is to the mid-foot. The patient takes her medication as prescribed. She is taking insulin. Her injection sites are rotated appropriately. The patient does check her feet daily at home.    Review of Systems    CONSTITUTIONAL- Denies Unexplained Weight Loss, Fever, Chills, Sweats, Fatigue, Weakness or Malaise.    GASTROINTESTINAL- Denies Abdominal Pain, Vomiting, Diarrhea, Blood per Rectum, Constipation or Heartburn.    PULMONARY- Denies Wheezing, Sputum Production, Cough, Hemoptysis or Pleuritic Chest Pain.    CARDIOVASCULAR- Denies Claudication or Irregular Heart Beat.    Medications      Current Outpatient Medications:   •   amLODIPine (NORVASC) 5 MG tablet, Take 1 tablet by mouth Daily., Disp: 90 tablet, Rfl: 1  •  aspirin 81 MG tablet, Take  by mouth daily., Disp: , Rfl:   •  atenolol (TENORMIN) 100 MG tablet, Take 1 tablet by mouth Daily., Disp: 90 tablet, Rfl: 1  •  atorvastatin (LIPITOR) 20 MG tablet, Take 1 tablet by mouth Every Night., Disp: 90 tablet, Rfl: 1  •  CILOXAN 0.3 % ophthalmic ointment, , Disp: , Rfl:   •  dorzolamide-timolol (COSOPT) 22.3-6.8 MG/ML ophthalmic solution, Apply  to eye 2 (two) times a day., Disp: , Rfl:   •  Empagliflozin (Jardiance) 10 MG tablet, Take 10 mg by mouth Daily., Disp: 90 tablet, Rfl: 1  •  glipizide (GLUCOTROL) 10 MG tablet, Take one tablet by mouth twice daily before food, Disp: 180 tablet, Rfl: 1  •  glucose blood (TRUE METRIX BLOOD GLUCOSE TEST) test strip, Use to test blood glucose once daily and as needed, Disp: 100 each, Rfl: 3  •  hydroCHLOROthiazide (HYDRODIURIL) 25 MG tablet, Take 1 tablet by mouth Daily., Disp: 90 tablet, Rfl: 1  •  Insulin Glargine (BASAGLAR KWIKPEN) 100 UNIT/ML injection pen, Increase basaglar up to 50u daily as directed (Patient taking differently: 45 units daily), Disp: 5 pen, Rfl: 6  •  Insulin Pen Needle 32G X 4 MM misc, Use daily with insulin, Disp: 50 each, Rfl: 11  •  Lancets misc, USE TO TEST ONCE DAILY AND AS NEEDED, Disp: 100 each, Rfl: 3  •  latanoprost (XALATAN) 0.005 % ophthalmic solution, , Disp: , Rfl:   •  lisinopril (PRINIVIL,ZESTRIL) 40 MG tablet, Take 1 tablet by mouth Daily., Disp: 90 tablet, Rfl: 1  •  metFORMIN ER (GLUCOPHAGE-XR) 500 MG 24 hr tablet, Take 2 tablets by mouth 2 (Two) Times a Day., Disp: 120 tablet, Rfl: 6  •  metroNIDAZOLE (METROGEL) 1 % gel, Apply  topically Daily., Disp: 60 g, Rfl: 3  •  Multiple Vitamins-Minerals (ICAPS AREDS FORMULA PO), Take  by mouth 2 (two) times a day., Disp: , Rfl:   •  SITagliptin (JANUVIA) 100 MG tablet, Take 1 tablet by mouth Daily., Disp: 90 tablet, Rfl: 1  •  PARoxetine (PAXIL) 20 MG tablet, TAKE  (1) TABLET DAILY IN THE MORNING., Disp: 90 tablet, Rfl: 1     Allergies    No Known Allergies    Problem List    Patient Active Problem List   Diagnosis   • Abnormal mammogram   • Anemia   • Depression   • Uncontrolled type 2 diabetes mellitus with hyperglycemia (CMS/[facility])   • Hyperlipidemia   • Hypertension   • Anxiety       Medications, Allergies, Problems List and Past History were reviewed and updated.    Physical Examination    /60 (BP Location: Right arm, Patient Position: Sitting, Cuff Size: Adult)   Pulse 68   Temp 97.7 °F (36.5 °C) (Infrared)   Resp 20   Wt 74.4 kg (164 lb)   LMP  (LMP Unknown) Comment: Last pap 1/2020 by JEREMIE Watson  Breastfeeding No   BMI 30.99 kg/m²     HEENT: Head- Normocephalic Atraumatic. Facies- Within normal limits. Pinnas- Normal texture and shape bilaterally. Canals- Normal bilaterally. TMs- Normal bilaterally. Nares- Patent bilaterally. Nasal Septum- is normal. Lids- Normal bilaterally. Conjunctiva- Clear bilaterally. Sclera- Anicteric bilaterally. Oropharynx- Moist with no lesions. Tonsils- No enlargement, erythema or exudate.    Neck: Thyroid- non enlarged, symmetric and has no nodules. No bruits are detected. ROM- Normal Range of Motion with no rigidity.    Lungs: Auscultation- Clear to auscultation bilaterally. There are no retractions, clubbing or cyanosis. The Expiratory to Inspiratory ratio is equal.    Cardiovascular: There are no carotid bruits. Heart- Normal Rate with Regular rhythm and no murmurs. There are no gallops. There are no rubs. In the lower extremities there is no edema. The upper extremities do not have edema.    Abdomen: Soft, benign, non-tender with no masses, hernias, organomegaly or scars.    Impression and Assessment    Essential Hypertension.    Hyperlipidemia.    Type 2 Diabetes Mellitus.    Diabetic Neuropathy.    Plan    Essential Hypertension Plan: The patient was instructed to exercise daily, eat a low fat diet and continue her  medications.    Hyperlipidemia Plan: The patient was instructed to exercise daily, eat a low fat diet and continue her medications.    Type 2 Diabetes Mellitus Plan: The current plan was continued.    Diabetic Neuropathy Plan: The condition is stable. No change is needed in the current plan.    Zeinab was seen today for follow-up and medicare wellness-initial visit.    Diagnoses and all orders for this visit:    Medicare annual wellness visit, initial    Hyperlipidemia, unspecified hyperlipidemia type  -     Comprehensive Metabolic Panel  -     Lipid Panel    Type 2 diabetes mellitus without complication, without long-term current use of insulin (CMS/[facility])  -     POC Microalbumin  -     Comprehensive Metabolic Panel    Essential hypertension  -     POC Urinalysis Dipstick, Automated  -     Comprehensive Metabolic Panel        Return to Office    The patient was instructed to return for follow-up in 6 months. Next Visit: Follow-up.    The patient was instructed to return sooner if the condition changes, worsens, or does not resolve.

## 2020-07-23 NOTE — PROGRESS NOTES
The ABCs of the Annual Wellness Visit  Initial Medicare Wellness Visit    Chief Complaint   Patient presents with   • Follow-up     6 month follow up chronic medical problems   • Medicare Wellness-Initial Visit       Subjective   History of Present Illness:  Zeinab Rodriguez is a 65 y.o. female who presents for an Initial Medicare Wellness Visit.    HEALTH RISK ASSESSMENT    Recent Hospitalizations:  No hospitalization(s) within the last year.    Current Medical Providers:  Patient Care Team:  Janes Almazan MD as PCP - General (Internal Medicine)  Elin Arredondo MD as Surgeon (General Surgery)  Karina Brown MD (Radiation Oncology)  Steven Akbar MD as Consulting Physician (Ophthalmology)  Elin Tapia APRN as Nurse Practitioner (Nurse Practitioner)  Rosario Claudio PA-C as Physician Assistant (Endocrinology)    Smoking Status:  Social History     Tobacco Use   Smoking Status Never Smoker   Smokeless Tobacco Never Used       Alcohol Consumption:  Social History     Substance and Sexual Activity   Alcohol Use No   • Frequency: Never       Depression Screen:   PHQ-2/PHQ-9 Depression Screening 7/23/2020   Little interest or pleasure in doing things 0   Feeling down, depressed, or hopeless 0   Trouble falling or staying asleep, or sleeping too much -   Feeling tired or having little energy -   Poor appetite or overeating -   Feeling bad about yourself - or that you are a failure or have let yourself or your family down -   Trouble concentrating on things, such as reading the newspaper or watching television -   Moving or speaking so slowly that other people could have noticed. Or the opposite - being so fidgety or restless that you have been moving around a lot more than usual -   Thoughts that you would be better off dead, or of hurting yourself in some way -   Total Score 0   If you checked off any problems, how difficult have these problems made it for you to do your work, take care of things at  home, or get along with other people? -       Fall Risk Screen:  KAUSHAL Fall Risk Assessment was completed, and patient is at HIGH risk for falls. Assessment completed on:7/23/2020    Health Habits and Functional and Cognitive Screening:  Functional & Cognitive Status 7/23/2020   Do you have difficulty preparing food and eating? Yes   Do you have difficulty bathing yourself, getting dressed or grooming yourself? No   Do you have difficulty using the toilet? No   Do you have difficulty moving around from place to place? Yes   Do you have trouble with steps or getting out of a bed or a chair? Yes   Current Diet Well Balanced Diet   Dental Exam Up to date   Eye Exam Up to date   Exercise (times per week) 0 times per week   Current Exercise Activities Include None   Do you need help using the phone?  No   Are you deaf or do you have serious difficulty hearing?  Yes   Do you need help with transportation? Yes   Do you need help shopping? Yes   Do you need help preparing meals?  Yes   Do you need help with housework?  Yes   Do you need help with laundry? No   Do you need help taking your medications? No   Do you need help managing money? No   Do you ever drive or ride in a car without wearing a seat belt? No   Have you felt unusual stress, anger or loneliness in the last month? Yes   Who do you live with? Spouse   If you need help, do you have trouble finding someone available to you? Yes   Have you been bothered in the last four weeks by sexual problems? No   Do you have difficulty concentrating, remembering or making decisions? Yes         Does the patient have evidence of cognitive impairment? No    Asprin use counseling:Taking ASA appropriately as indicated    Age-appropriate Screening Schedule:  Refer to the list below for future screening recommendations based on patient's age, sex and/or medical conditions. Orders for these recommended tests are listed in the plan section. The patient has been provided with a  written plan.    Health Maintenance   Topic Date Due   • TDAP/TD VACCINES (1 - Tdap) 07/17/1965   • ZOSTER VACCINE (1 of 2) 07/17/2004   • URINE MICROALBUMIN  06/06/2020   • INFLUENZA VACCINE  08/01/2020   • DIABETIC FOOT EXAM  09/24/2020   • DIABETIC EYE EXAM  10/17/2020   • LIPID PANEL  12/05/2020   • HEMOGLOBIN A1C  12/15/2020   • MAMMOGRAM  04/30/2021   • COLONOSCOPY  10/07/2023          The following portions of the patient's history were reviewed and updated as appropriate: allergies, current medications, past family history, past medical history, past social history, past surgical history and problem list.    Outpatient Medications Prior to Visit   Medication Sig Dispense Refill   • amLODIPine (NORVASC) 5 MG tablet Take 1 tablet by mouth Daily. 90 tablet 1   • aspirin 81 MG tablet Take  by mouth daily.     • atenolol (TENORMIN) 100 MG tablet Take 1 tablet by mouth Daily. 90 tablet 1   • atorvastatin (LIPITOR) 20 MG tablet Take 1 tablet by mouth Every Night. 90 tablet 1   • CILOXAN 0.3 % ophthalmic ointment      • dorzolamide-timolol (COSOPT) 22.3-6.8 MG/ML ophthalmic solution Apply  to eye 2 (two) times a day.     • Empagliflozin (Jardiance) 10 MG tablet Take 10 mg by mouth Daily. 90 tablet 1   • glipizide (GLUCOTROL) 10 MG tablet Take one tablet by mouth twice daily before food 180 tablet 1   • glucose blood (TRUE METRIX BLOOD GLUCOSE TEST) test strip Use to test blood glucose once daily and as needed 100 each 3   • hydroCHLOROthiazide (HYDRODIURIL) 25 MG tablet Take 1 tablet by mouth Daily. 90 tablet 1   • Insulin Glargine (BASAGLAR KWIKPEN) 100 UNIT/ML injection pen Increase basaglar up to 50u daily as directed (Patient taking differently: 45 units daily) 5 pen 6   • Insulin Pen Needle 32G X 4 MM misc Use daily with insulin 50 each 11   • Lancets misc USE TO TEST ONCE DAILY AND AS NEEDED 100 each 3   • latanoprost (XALATAN) 0.005 % ophthalmic solution      • lisinopril (PRINIVIL,ZESTRIL) 40 MG tablet Take  1 tablet by mouth Daily. 90 tablet 1   • metFORMIN ER (GLUCOPHAGE-XR) 500 MG 24 hr tablet Take 2 tablets by mouth 2 (Two) Times a Day. 120 tablet 6   • metroNIDAZOLE (METROGEL) 1 % gel Apply  topically Daily. 60 g 3   • Multiple Vitamins-Minerals (ICAPS AREDS FORMULA PO) Take  by mouth 2 (two) times a day.     • SITagliptin (JANUVIA) 100 MG tablet Take 1 tablet by mouth Daily. 90 tablet 1   • PARoxetine (PAXIL) 20 MG tablet TAKE (1) TABLET DAILY IN THE MORNING. 90 tablet 1     No facility-administered medications prior to visit.        Patient Active Problem List   Diagnosis   • Abnormal mammogram   • Anemia   • Depression   • Uncontrolled type 2 diabetes mellitus with hyperglycemia (CMS/[facility])   • Hyperlipidemia   • Hypertension   • Anxiety       Advanced Care Planning:  ACP discussion was held with the patient during this visit. Patient does not have an advance directive, information provided.    Review of Systems    Compared to one year ago, the patient feels her physical health is the same.  Compared to one year ago, the patient feels her mental health is the same.    Reviewed chart for potential of high risk medication in the elderly: yes  Reviewed chart for potential of harmful drug interactions in the elderly:yes    Objective         Vitals:    07/23/20 1218   BP: 122/60   BP Location: Right arm   Patient Position: Sitting   Cuff Size: Adult   Pulse: 68   Resp: 20   Temp: 97.7 °F (36.5 °C)   TempSrc: Infrared   Weight: 74.4 kg (164 lb)   PainSc: 0-No pain       Body mass index is 30.99 kg/m².  Discussed the patient's BMI with her. The BMI is above average; BMI management plan is completed.    Physical Exam Finger Rub Hearing{Test (right ear):passed  Finger Rub Hearing{Test (left ear):passed        Lab Results   Component Value Date    HGBA1C 6.4 06/15/2020        Assessment/Plan   Medicare Risks and Personalized Health Plan  CMS Preventative Services Quick Reference  Advance Directive Discussion  Fall  Risk  Immunizations Discussed/Encouraged (specific immunizations; Pneumococcal 23 and Shingrix )  Obesity/Overweight     The above risks/problems have been discussed with the patient.  Pertinent information has been shared with the patient in the After Visit Summary.  Follow up plans and orders are seen below in the Assessment/Plan Section.    Zeinab was seen today for follow-up and medicare wellness-initial visit.    Diagnoses and all orders for this visit:    Medicare annual wellness visit, initial          An After Visit Summary and PPPS were given to the patient.

## 2020-07-24 LAB
ALBUMIN SERPL-MCNC: 4.3 G/DL (ref 3.8–4.8)
ALBUMIN/GLOB SERPL: 1.8 {RATIO} (ref 1.2–2.2)
ALP SERPL-CCNC: 80 IU/L (ref 39–117)
ALT SERPL-CCNC: 19 IU/L (ref 0–32)
AST SERPL-CCNC: 23 IU/L (ref 0–40)
BILIRUB SERPL-MCNC: 0.5 MG/DL (ref 0–1.2)
BUN SERPL-MCNC: 17 MG/DL (ref 8–27)
BUN/CREAT SERPL: 22 (ref 12–28)
CALCIUM SERPL-MCNC: 9.3 MG/DL (ref 8.7–10.3)
CHLORIDE SERPL-SCNC: 99 MMOL/L (ref 96–106)
CHOLEST SERPL-MCNC: 105 MG/DL (ref 100–199)
CO2 SERPL-SCNC: 25 MMOL/L (ref 20–29)
CREAT SERPL-MCNC: 0.78 MG/DL (ref 0.57–1)
GLOBULIN SER CALC-MCNC: 2.4 G/DL (ref 1.5–4.5)
GLUCOSE SERPL-MCNC: 132 MG/DL (ref 65–99)
HDLC SERPL-MCNC: 36 MG/DL
LDLC SERPL CALC-MCNC: 30 MG/DL (ref 0–99)
POTASSIUM SERPL-SCNC: 4.1 MMOL/L (ref 3.5–5.2)
PROT SERPL-MCNC: 6.7 G/DL (ref 6–8.5)
SODIUM SERPL-SCNC: 139 MMOL/L (ref 134–144)
TRIGL SERPL-MCNC: 197 MG/DL (ref 0–149)
VLDLC SERPL CALC-MCNC: 39 MG/DL (ref 5–40)

## 2020-08-27 RX ORDER — ATENOLOL 100 MG/1
100 TABLET ORAL DAILY
Qty: 90 TABLET | Refills: 1 | Status: SHIPPED | OUTPATIENT
Start: 2020-08-27 | End: 2021-02-25 | Stop reason: SDUPTHER

## 2020-08-27 NOTE — TELEPHONE ENCOUNTER
PATIENT CALLED REQUESTING REFILL FOR ATENOLOL 100 MG TABLETS.    PATIENT STATES SHE NEEDS THE 90 DAY SUPPLY IN ORDER FOR INSURANCE TO PAY    PATIENT STATES PHARMACY SENT A REQUEST TO OFFICE ON 8/24/20.    PATIENT ONLY HAS 2 DAY SUPPLY LEFT    PATIENT CALL BACK NUMBER [phone]    PATIENT USES [facility] IN [place]

## 2020-09-01 DIAGNOSIS — E78.5 HYPERLIPIDEMIA, UNSPECIFIED HYPERLIPIDEMIA TYPE: ICD-10-CM

## 2020-09-01 DIAGNOSIS — E11.9 TYPE 2 DIABETES MELLITUS WITHOUT COMPLICATION, WITHOUT LONG-TERM CURRENT USE OF INSULIN (HCC): ICD-10-CM

## 2020-09-01 RX ORDER — ATORVASTATIN CALCIUM 20 MG/1
20 TABLET, FILM COATED ORAL NIGHTLY
Qty: 90 TABLET | Refills: 1 | Status: SHIPPED | OUTPATIENT
Start: 2020-09-01 | End: 2021-03-02 | Stop reason: SDUPTHER

## 2020-09-10 DIAGNOSIS — F32.89 OTHER DEPRESSION: ICD-10-CM

## 2020-09-10 DIAGNOSIS — E11.65 UNCONTROLLED TYPE 2 DIABETES MELLITUS WITH HYPERGLYCEMIA (HCC): ICD-10-CM

## 2020-09-10 DIAGNOSIS — F41.9 ANXIETY: ICD-10-CM

## 2020-09-10 RX ORDER — PAROXETINE HYDROCHLORIDE 20 MG/1
20 TABLET, FILM COATED ORAL EVERY MORNING
Qty: 90 TABLET | Refills: 1 | Status: SHIPPED | OUTPATIENT
Start: 2020-09-10 | End: 2021-03-02 | Stop reason: SDUPTHER

## 2020-09-10 RX ORDER — METFORMIN HYDROCHLORIDE 500 MG/1
1000 TABLET, EXTENDED RELEASE ORAL 2 TIMES DAILY
Qty: 360 TABLET | Refills: 1 | Status: SHIPPED | OUTPATIENT
Start: 2020-09-10 | End: 2020-09-11 | Stop reason: SDUPTHER

## 2020-09-11 DIAGNOSIS — E11.65 UNCONTROLLED TYPE 2 DIABETES MELLITUS WITH HYPERGLYCEMIA (HCC): ICD-10-CM

## 2020-09-11 RX ORDER — METFORMIN HYDROCHLORIDE 500 MG/1
1000 TABLET, EXTENDED RELEASE ORAL 2 TIMES DAILY
Qty: 360 TABLET | Refills: 1 | Status: SHIPPED | OUTPATIENT
Start: 2020-09-11 | End: 2021-03-17 | Stop reason: SDUPTHER

## 2020-09-16 ENCOUNTER — LAB REQUISITION (OUTPATIENT)
Dept: LAB | Facility: HOSPITAL | Age: 65
End: 2020-09-16

## 2020-09-16 ENCOUNTER — OFFICE VISIT (OUTPATIENT)
Dept: ENDOCRINOLOGY | Facility: CLINIC | Age: 65
End: 2020-09-16

## 2020-09-16 VITALS
WEIGHT: 164.6 LBS | HEART RATE: 67 BPM | DIASTOLIC BLOOD PRESSURE: 72 MMHG | SYSTOLIC BLOOD PRESSURE: 120 MMHG | BODY MASS INDEX: 31.1 KG/M2 | OXYGEN SATURATION: 98 %

## 2020-09-16 DIAGNOSIS — Z00.00 ROUTINE GENERAL MEDICAL EXAMINATION AT A HEALTH CARE FACILITY: ICD-10-CM

## 2020-09-16 DIAGNOSIS — E55.9 VITAMIN D DEFICIENCY: ICD-10-CM

## 2020-09-16 DIAGNOSIS — E11.65 UNCONTROLLED TYPE 2 DIABETES MELLITUS WITH HYPERGLYCEMIA (HCC): Primary | ICD-10-CM

## 2020-09-16 DIAGNOSIS — R53.82 CHRONIC FATIGUE: ICD-10-CM

## 2020-09-16 DIAGNOSIS — E61.1 IRON DEFICIENCY: ICD-10-CM

## 2020-09-16 LAB
GLUCOSE BLDC GLUCOMTR-MCNC: 428 MG/DL (ref 70–130)
HBA1C MFR BLD: 6.3 %

## 2020-09-16 PROCEDURE — 82947 ASSAY GLUCOSE BLOOD QUANT: CPT | Performed by: PHYSICIAN ASSISTANT

## 2020-09-16 PROCEDURE — 99214 OFFICE O/P EST MOD 30 MIN: CPT | Performed by: PHYSICIAN ASSISTANT

## 2020-09-16 PROCEDURE — 83036 HEMOGLOBIN GLYCOSYLATED A1C: CPT | Performed by: PHYSICIAN ASSISTANT

## 2020-09-16 RX ORDER — EMPAGLIFLOZIN 10 MG/1
10 TABLET, FILM COATED ORAL DAILY
Qty: 90 TABLET | Refills: 1 | Status: SHIPPED | OUTPATIENT
Start: 2020-09-16 | End: 2021-04-19 | Stop reason: SDUPTHER

## 2020-09-16 RX ORDER — INSULIN GLARGINE 100 [IU]/ML
INJECTION, SOLUTION SUBCUTANEOUS
Qty: 15 PEN | Refills: 1 | Status: SHIPPED | OUTPATIENT
Start: 2020-09-16 | End: 2021-02-03 | Stop reason: SDUPTHER

## 2020-09-16 NOTE — PROGRESS NOTES
"Chief Complaint  F/u for Diabetes Mellitus.    HPI   Zeinab Rodriguez is a 65 y.o. female who is here today for f/u of Diabetes Mellitus type 2. The initial diagnosis of diabetes was made approximately 2009.     Pt reports to be doing well.    during visit. Had sweet tea before coming in.   Fatigue during the day. Snores at night. Had sleep study about 18 months ago and reports it was normal.     A1C- 6.3 (9/16/2020), 6.4 (6/15/2020), 7.6 (3/11/2020), 6.6 (12/11/19), 9.9 (9/24/19), 9.5 (6/6/19)    Diabetic complications: peripheral neuropathy- tingling  Eye exam current (within one year): utd  Foot care and dental care: discussed    Current diabetic medications include:  Metformin ER 500mg 2 tab BID - tolerating better than immediate release  Glipizide 10mg BID  Januvia 100mg QD - expensive   Jardiance 10mg daily  Basaglar 45U QHS    Statin: lipitor 20    Past medications: none    Diabetic Monitoring  -   BS log reviewed- checking fasting, most readings ~. Pt denies hypoglycemia    Nutrition:     Current diet: in general, an \"unhealthy\" diet, on average, 3 meals per day  Current exercise: none    The following portions of the patient's history were reviewed and updated by me as appropriate: allergies, current medications, past family history, past social history, past surgical history and problem list.        Past Medical History:   Diagnosis Date   • Anxiety    • Breast cancer (CMS/[facility])    • Depression    • Diabetes mellitus (CMS/[facility])    • Glaucoma    • Hypertension    • Myopic degeneration        Medications    Current Outpatient Medications:   •  amLODIPine (NORVASC) 5 MG tablet, Take 1 tablet by mouth Daily., Disp: 90 tablet, Rfl: 1  •  aspirin 81 MG tablet, Take  by mouth daily., Disp: , Rfl:   •  atenolol (TENORMIN) 100 MG tablet, Take 1 tablet by mouth Daily., Disp: 90 tablet, Rfl: 1  •  atorvastatin (LIPITOR) 20 MG tablet, Take 1 tablet by mouth Every Night., Disp: 90 tablet, Rfl: 1  •  CILOXAN " 0.3 % ophthalmic ointment, , Disp: , Rfl:   •  dorzolamide-timolol (COSOPT) 22.3-6.8 MG/ML ophthalmic solution, Apply  to eye 2 (two) times a day., Disp: , Rfl:   •  Empagliflozin (Jardiance) 10 MG tablet, Take 10 mg by mouth Daily., Disp: 90 tablet, Rfl: 1  •  glipizide (GLUCOTROL) 10 MG tablet, Take one tablet by mouth twice daily before food, Disp: 180 tablet, Rfl: 1  •  glucose blood (TRUE METRIX BLOOD GLUCOSE TEST) test strip, Use to test blood glucose once daily and as needed, Disp: 100 each, Rfl: 3  •  hydroCHLOROthiazide (HYDRODIURIL) 25 MG tablet, Take 1 tablet by mouth Daily., Disp: 90 tablet, Rfl: 1  •  Insulin Glargine (BASAGLAR KWIKPEN) 100 UNIT/ML injection pen, Take 45 units qhs, Disp: 15 pen, Rfl: 1  •  Insulin Pen Needle 32G X 4 MM misc, Use daily with insulin, Disp: 50 each, Rfl: 11  •  Lancets misc, USE TO TEST ONCE DAILY AND AS NEEDED, Disp: 100 each, Rfl: 3  •  latanoprost (XALATAN) 0.005 % ophthalmic solution, , Disp: , Rfl:   •  lisinopril (PRINIVIL,ZESTRIL) 40 MG tablet, Take 1 tablet by mouth Daily., Disp: 90 tablet, Rfl: 1  •  metFORMIN ER (GLUCOPHAGE-XR) 500 MG 24 hr tablet, Take 2 tablets by mouth 2 (Two) Times a Day., Disp: 360 tablet, Rfl: 1  •  metroNIDAZOLE (METROGEL) 1 % gel, Apply  topically Daily., Disp: 60 g, Rfl: 3  •  Multiple Vitamins-Minerals (ICAPS AREDS FORMULA PO), Take  by mouth 2 (two) times a day., Disp: , Rfl:   •  PARoxetine (PAXIL) 20 MG tablet, Take 1 tablet by mouth Every Morning., Disp: 90 tablet, Rfl: 1  •  SITagliptin (Januvia) 100 MG tablet, Take 1 tablet by mouth Daily., Disp: 90 tablet, Rfl: 1    Review of Systems  Review of Systems   Constitutional: Positive for fatigue. Negative for chills, fever and unexpected weight change.        Feeling poorly   HENT: Negative for ear pain, hearing loss, nosebleeds, rhinorrhea and sore throat.    Eyes: Negative for pain, discharge, redness and itching.   Respiratory: Negative for cough, shortness of breath and wheezing.     Cardiovascular: Negative for chest pain, palpitations and leg swelling.   Gastrointestinal: Negative for abdominal pain, blood in stool and constipation.   Endocrine: Negative for cold intolerance, heat intolerance and polydipsia.   Genitourinary: Negative for dysuria, frequency, hematuria, pelvic pain, vaginal bleeding and vaginal discharge.   Musculoskeletal: Positive for arthralgias. Negative for gait problem, joint swelling and myalgias.   Skin: Negative for rash.   Allergic/Immunologic: Negative.    Neurological: Negative for dizziness, syncope, weakness, numbness and headaches.   Hematological: Negative for adenopathy. Does not bruise/bleed easily.   Psychiatric/Behavioral: Negative for sleep disturbance and suicidal ideas. The patient is not nervous/anxious.         Physical Exam    /72   Pulse 67   Wt 74.7 kg (164 lb 9.6 oz)   LMP  (LMP Unknown) Comment: Last pap 1/2020 by JEREMIE Watson  SpO2 98%   BMI 31.10 kg/m² Body mass index is 31.1 kg/m².  Physical Exam   Constitutional: She is oriented to person, place, and time. She appears well-developed. No distress.   HENT:   Head: Normocephalic.   Right Ear: External ear normal.   Left Ear: External ear normal.   Mouth/Throat: No oropharyngeal exudate.   Eyes: Conjunctivae and lids are normal. Right eye exhibits no discharge. Left eye exhibits no discharge. Right pupil is reactive. Left pupil is reactive.   Neck: No JVD present. No tracheal deviation present. No thyroid mass and no thyromegaly present.   Cardiovascular: Normal rate, regular rhythm and normal heart sounds.   No murmur heard.  Pulmonary/Chest: Effort normal and breath sounds normal. No respiratory distress. She has no wheezes.   Abdominal: Soft. Bowel sounds are normal. There is no abdominal tenderness.   Musculoskeletal: No tenderness.   Lymphadenopathy:     She has no cervical adenopathy.   Neurological: She is alert and oriented to person, place, and time.   Skin: Skin is warm  and dry. No rash noted. She is not diaphoretic. No erythema.   Psychiatric: Her speech is normal and behavior is normal. Thought content normal.       Labs and Imaging   Lab Results   Component Value Date    HGBA1C 6.3 09/16/2020    HGBA1C 6.4 06/15/2020    HGBA1C 7.6 03/11/2020     Office Visit on 09/16/2020   Component Date Value Ref Range Status   • Glucose 09/16/2020 428* 70 - 130 mg/dL Final   • Hemoglobin A1C 09/16/2020 6.3  % Final     No images are attached to the encounter or orders placed in the encounter.  No Images in the past 120 days found..    Assessment / Plan   Zeinab was seen today for follow-up.    Diagnoses and all orders for this visit:    Uncontrolled type 2 diabetes mellitus with hyperglycemia (CMS/[facility])  -     POC Glucose, Blood  -     POC Glycosylated Hemoglobin (Hb A1C)  -     Empagliflozin (Jardiance) 10 MG tablet; Take 10 mg by mouth Daily.  -     SITagliptin (Januvia) 100 MG tablet; Take 1 tablet by mouth Daily.  -     Insulin Glargine (BASAGLAR KWIKPEN) 100 UNIT/ML injection pen; Take 45 units qhs    Chronic fatigue  -     TSH  -     CBC & Differential  -     Ferritin  -     Vitamin D 25 Hydroxy  -     Vitamin B12    Iron deficiency  -     Ferritin    Vitamin D deficiency  -     Vitamin D 25 Hydroxy        Diabetes Mellitus 2 is under good control.  -A1c 6.4, down from 7.6 6.6 last visit  -continue metformin ER 500mg 2 pills 2x/day.  -continue Jardiance 10mg daily.   -continue basaglar 45u qHS  -Continue Januvia 100mg daily  -consider change basaglar and januvia to soliqua in the future  -Continue glipizide 10mg 2x/day. Make sure to take this medicine before you eat.   -discussed watching carb intake, continue exercise  -bs 428 during visit after having sweet tea with a refill. Administered 6 units fast acting insulin. She took glipizide before breakfast. We discussed eliminating sweet tea. She denies n/v, abdominal pain    1.  Diet: 3-4 carb servings per meal for females, 4-5 carb  servings per meal for males  Spread carb intake throughout the day  Increase lean protein and vegetable intake  Avoid sugary drinks and processed carbs including crackers, cookies, cakes  2.  Exercise: Recommend at least 30 minutes of exercise daily, at least 5 days per week. Increase exercise gradually.   3.  Blood Glucose Goal: Blood glucose goal <150 fasting, <180 2 hr postprandial  4.  Microalbumin due 7/2021  5.  Education performed during this visit: long term diabetic complications discussed. , annual eye examinations at Ophthalmology discussed, dental hygiene discussed  and foot care reviewed., home glucose monitoring emphasized, all medications, side effects and compliance discussed carefully and Hypoglycemia management and prevention reviewed. Reviewed ‘ABCs’ of diabetes management (respective goals in parentheses):  A1C (<7), blood pressure (<130/80), and cholesterol (LDL <100, if CVD <70).    There are no Patient Instructions on file for this visit.    Follow up: Return in about 3 months (around 12/16/2020).    Discussed the nature of the disease including, risks, complications, implications, management, safe and proper use of medications. Encouraged therapeutic lifestyle changes including low calorie diet with plenty of fruits and vegetables, daily exercise, medication compliance, and keeping scheduled follow up appointments with me and any other providers. Encouraged patient to have appointment for complete physical, fasting labs, appropriate screenings, and immunizations on an annual basis.        Rosario Claudio PA-C

## 2020-09-17 LAB
25(OH)D3+25(OH)D2 SERPL-MCNC: 19.7 NG/ML (ref 30–100)
BASOPHILS # BLD AUTO: 0.02 10*3/MM3 (ref 0–0.2)
BASOPHILS NFR BLD AUTO: 0.6 % (ref 0–1.5)
EOSINOPHIL # BLD AUTO: 0.17 10*3/MM3 (ref 0–0.4)
EOSINOPHIL NFR BLD AUTO: 5 % (ref 0.3–6.2)
ERYTHROCYTE [DISTWIDTH] IN BLOOD BY AUTOMATED COUNT: 14.1 % (ref 12.3–15.4)
FERRITIN SERPL-MCNC: 18.8 NG/ML (ref 13–150)
HCT VFR BLD AUTO: 45.3 % (ref 34–46.6)
HGB BLD-MCNC: 14.7 G/DL (ref 12–15.9)
IMM GRANULOCYTES # BLD AUTO: 0.01 10*3/MM3 (ref 0–0.05)
IMM GRANULOCYTES NFR BLD AUTO: 0.3 % (ref 0–0.5)
LYMPHOCYTES # BLD AUTO: 1.06 10*3/MM3 (ref 0.7–3.1)
LYMPHOCYTES NFR BLD AUTO: 31 % (ref 19.6–45.3)
MCH RBC QN AUTO: 28.5 PG (ref 26.6–33)
MCHC RBC AUTO-ENTMCNC: 32.5 G/DL (ref 31.5–35.7)
MCV RBC AUTO: 87.8 FL (ref 79–97)
MONOCYTES # BLD AUTO: 0.31 10*3/MM3 (ref 0.1–0.9)
MONOCYTES NFR BLD AUTO: 9.1 % (ref 5–12)
NEUTROPHILS # BLD AUTO: 1.85 10*3/MM3 (ref 1.7–7)
NEUTROPHILS NFR BLD AUTO: 54 % (ref 42.7–76)
NRBC BLD AUTO-RTO: 0 /100 WBC (ref 0–0.2)
PLATELET # BLD AUTO: 110 10*3/MM3 (ref 140–450)
RBC # BLD AUTO: 5.16 10*6/MM3 (ref 3.77–5.28)
TSH SERPL DL<=0.005 MIU/L-ACNC: 0.99 UIU/ML (ref 0.27–4.2)
VIT B12 SERPL-MCNC: 304 PG/ML (ref 211–946)
WBC # BLD AUTO: 3.42 10*3/MM3 (ref 3.4–10.8)

## 2020-09-18 DIAGNOSIS — E55.9 VITAMIN D DEFICIENCY: Primary | ICD-10-CM

## 2020-09-18 RX ORDER — ERGOCALCIFEROL 1.25 MG/1
50000 CAPSULE ORAL WEEKLY
Qty: 15 CAPSULE | Refills: 1 | Status: SHIPPED | OUTPATIENT
Start: 2020-09-18 | End: 2021-11-08

## 2020-10-05 ENCOUNTER — TELEPHONE (OUTPATIENT)
Dept: INTERNAL MEDICINE | Facility: CLINIC | Age: 65
End: 2020-10-05

## 2020-10-05 NOTE — TELEPHONE ENCOUNTER
PATIENT CALLED STATING THE REFUGIO CHAIREZ, HAD LABS DONE 9/16 AND WOULD LIKE DR NARVAEZ TO REVIEW THEM AS WELL.     CALL BACK [phone]

## 2020-12-01 ENCOUNTER — TELEPHONE (OUTPATIENT)
Dept: INTERNAL MEDICINE | Facility: CLINIC | Age: 65
End: 2020-12-01

## 2020-12-01 NOTE — TELEPHONE ENCOUNTER
Patient requested a call back.    Patient had lab work on 9/16/20 for Dr. Claudio and would like to know if Dr. Almazan has looked at these results. Patient would like to know if Dr. lAmazan thinks the results are satisfactory.    Patient would like to know when she last had a flu vaccine.    Please call and advise. Patient call back [phone]

## 2020-12-02 NOTE — TELEPHONE ENCOUNTER
Her blood sugar was elevated but her Hemoglobin A1c was acceptable.    Her Vitamin D levels are low.  Please start an over the counter Vitamin D3 supplement at a dosage of 2000-units daily for the next 6 weeks and then continue at a dosage of 800-units daily thereafter.    Her other labs were normal.    Janes Almazan MD  20:00 EST  12/01/20

## 2021-01-05 DIAGNOSIS — E11.9 CONTROLLED TYPE 2 DIABETES MELLITUS WITHOUT COMPLICATION, WITH LONG-TERM CURRENT USE OF INSULIN (HCC): ICD-10-CM

## 2021-01-05 DIAGNOSIS — Z79.4 CONTROLLED TYPE 2 DIABETES MELLITUS WITHOUT COMPLICATION, WITH LONG-TERM CURRENT USE OF INSULIN (HCC): ICD-10-CM

## 2021-01-05 RX ORDER — HYDROCHLOROTHIAZIDE 25 MG/1
25 TABLET ORAL DAILY
Qty: 90 TABLET | Refills: 1 | Status: SHIPPED | OUTPATIENT
Start: 2021-01-05 | End: 2021-07-02 | Stop reason: SDUPTHER

## 2021-01-05 RX ORDER — AMLODIPINE BESYLATE 5 MG/1
5 TABLET ORAL DAILY
Qty: 90 TABLET | Refills: 1 | Status: SHIPPED | OUTPATIENT
Start: 2021-01-05 | End: 2021-07-02 | Stop reason: SDUPTHER

## 2021-01-05 RX ORDER — LISINOPRIL 40 MG/1
40 TABLET ORAL DAILY
Qty: 90 TABLET | Refills: 1 | Status: SHIPPED | OUTPATIENT
Start: 2021-01-05 | End: 2021-07-02 | Stop reason: SDUPTHER

## 2021-01-05 RX ORDER — GLIPIZIDE 10 MG/1
TABLET ORAL
Qty: 180 TABLET | Refills: 1 | Status: SHIPPED | OUTPATIENT
Start: 2021-01-05 | End: 2021-07-13 | Stop reason: SDUPTHER

## 2021-01-05 NOTE — TELEPHONE ENCOUNTER
Patient needs her glipizide refilled at [facility]. I have pended it for you.

## 2021-01-05 NOTE — TELEPHONE ENCOUNTER
Caller: Jennifer Zeinab ZUNILDA    Relationship: Self    Best call back number: [phone]    Medication needed:   Requested Prescriptions     Pending Prescriptions Disp Refills   • amLODIPine (NORVASC) 5 MG tablet 90 tablet 1     Sig: Take 1 tablet by mouth Daily.   • hydroCHLOROthiazide (HYDRODIURIL) 25 MG tablet 90 tablet 1     Sig: Take 1 tablet by mouth Daily.   • lisinopril (PRINIVIL,ZESTRIL) 40 MG tablet 90 tablet 1     Sig: Take 1 tablet by mouth Daily.       When do you need the refill by: ASAP     What details did the patient provide when requesting the medication: PATIENT REQUESTING A 90 DAY SUPPLY    Does the patient have less than a 3 day supply:  [x] Yes  [] No    What is the patient's preferred pharmacy: [facility] [address] [phone] [facility] [phone] FX

## 2021-01-18 ENCOUNTER — OFFICE VISIT (OUTPATIENT)
Dept: ENDOCRINOLOGY | Facility: CLINIC | Age: 66
End: 2021-01-18

## 2021-01-18 VITALS
WEIGHT: 164.6 LBS | BODY MASS INDEX: 31.1 KG/M2 | SYSTOLIC BLOOD PRESSURE: 120 MMHG | HEART RATE: 65 BPM | OXYGEN SATURATION: 98 % | DIASTOLIC BLOOD PRESSURE: 70 MMHG

## 2021-01-18 DIAGNOSIS — E11.9 CONTROLLED TYPE 2 DIABETES MELLITUS WITHOUT COMPLICATION, WITH LONG-TERM CURRENT USE OF INSULIN (HCC): Primary | ICD-10-CM

## 2021-01-18 DIAGNOSIS — E78.2 MIXED HYPERLIPIDEMIA: Chronic | ICD-10-CM

## 2021-01-18 DIAGNOSIS — Z79.4 CONTROLLED TYPE 2 DIABETES MELLITUS WITHOUT COMPLICATION, WITH LONG-TERM CURRENT USE OF INSULIN (HCC): Primary | ICD-10-CM

## 2021-01-18 LAB
GLUCOSE BLDC GLUCOMTR-MCNC: 124 MG/DL (ref 70–130)
HBA1C MFR BLD: 6.1 %

## 2021-01-18 PROCEDURE — 99214 OFFICE O/P EST MOD 30 MIN: CPT | Performed by: PHYSICIAN ASSISTANT

## 2021-01-18 PROCEDURE — 82947 ASSAY GLUCOSE BLOOD QUANT: CPT | Performed by: PHYSICIAN ASSISTANT

## 2021-01-18 PROCEDURE — 83036 HEMOGLOBIN GLYCOSYLATED A1C: CPT | Performed by: PHYSICIAN ASSISTANT

## 2021-01-18 NOTE — PROGRESS NOTES
"Chief Complaint  F/u for Diabetes Mellitus.    HPI   Zeinab Rodriguez is a 65 y.o. female who is here today for f/u of Diabetes Mellitus type 2. The initial diagnosis of diabetes was made approximately 2009.     Had eye exam last week- [facility], Dr. Akbar, no retinopathy    A1C- 6.1 (1/18/2021), 6.3 (9/16/2020), 6.4 (6/15/2020), 7.6 (3/11/2020), 6.6 (12/11/19), 9.9 (9/24/19), 9.5 (6/6/19)    Diabetic complications: peripheral neuropathy- tingling  Eye exam current (within one year): utd  Foot care and dental care: discussed    Current diabetic medications include:  Metformin ER 500mg 2 tab BID - tolerating better than immediate release  Glipizide 10mg BID  Januvia 100mg QD - expensive   Jardiance 10mg daily  Basaglar 45U QHS    Statin: lipitor 20    Past medications: none    Diabetic Monitoring  -   BS log reviewed- checking 3-4 times per week fast and throughout the day, most readings 100-130s, occasional 200, no hypoglycemia    Nutrition:     Current diet: in general, an \"unhealthy\" diet, on average, 3 meals per day  Current exercise: none    The following portions of the patient's history were reviewed and updated by me as appropriate: allergies, current medications, past family history, past social history, past surgical history and problem list.      Past Medical History:   Diagnosis Date   • Anxiety    • Breast cancer (CMS/[facility])    • Depression    • Diabetes mellitus (CMS/[facility])    • Glaucoma    • Hypertension    • Myopic degeneration        Medications    Current Outpatient Medications:   •  amLODIPine (NORVASC) 5 MG tablet, Take 1 tablet by mouth Daily., Disp: 90 tablet, Rfl: 1  •  aspirin 81 MG tablet, Take  by mouth daily., Disp: , Rfl:   •  atenolol (TENORMIN) 100 MG tablet, Take 1 tablet by mouth Daily., Disp: 90 tablet, Rfl: 1  •  atorvastatin (LIPITOR) 20 MG tablet, Take 1 tablet by mouth Every Night., Disp: 90 tablet, Rfl: 1  •  CILOXAN 0.3 % ophthalmic ointment, , Disp: , Rfl:   •  " dorzolamide-timolol (COSOPT) 22.3-6.8 MG/ML ophthalmic solution, Apply  to eye 2 (two) times a day., Disp: , Rfl:   •  Empagliflozin (Jardiance) 10 MG tablet, Take 10 mg by mouth Daily., Disp: 90 tablet, Rfl: 1  •  glipizide (GLUCOTROL) 10 MG tablet, Take one tablet by mouth twice daily before food, Disp: 180 tablet, Rfl: 1  •  glucose blood (TRUE METRIX BLOOD GLUCOSE TEST) test strip, Use to test blood glucose once daily and as needed, Disp: 100 each, Rfl: 3  •  hydroCHLOROthiazide (HYDRODIURIL) 25 MG tablet, Take 1 tablet by mouth Daily., Disp: 90 tablet, Rfl: 1  •  Insulin Glargine (BASAGLAR KWIKPEN) 100 UNIT/ML injection pen, Take 45 units qhs, Disp: 15 pen, Rfl: 1  •  Insulin Pen Needle 32G X 4 MM misc, Use daily with insulin, Disp: 50 each, Rfl: 11  •  Lancets misc, USE TO TEST ONCE DAILY AND AS NEEDED, Disp: 100 each, Rfl: 3  •  latanoprost (XALATAN) 0.005 % ophthalmic solution, , Disp: , Rfl:   •  lisinopril (PRINIVIL,ZESTRIL) 40 MG tablet, Take 1 tablet by mouth Daily., Disp: 90 tablet, Rfl: 1  •  metFORMIN ER (GLUCOPHAGE-XR) 500 MG 24 hr tablet, Take 2 tablets by mouth 2 (Two) Times a Day., Disp: 360 tablet, Rfl: 1  •  metroNIDAZOLE (METROGEL) 1 % gel, Apply  topically Daily., Disp: 60 g, Rfl: 3  •  Multiple Vitamins-Minerals (ICAPS AREDS FORMULA PO), Take  by mouth 2 (two) times a day., Disp: , Rfl:   •  PARoxetine (PAXIL) 20 MG tablet, Take 1 tablet by mouth Every Morning., Disp: 90 tablet, Rfl: 1  •  SITagliptin (Januvia) 100 MG tablet, Take 1 tablet by mouth Daily., Disp: 90 tablet, Rfl: 1  •  vitamin D (ERGOCALCIFEROL) 1.25 MG (07556 UT) capsule capsule, Take 1 capsule by mouth 1 (One) Time Per Week., Disp: 15 capsule, Rfl: 1    Review of Systems  Review of Systems   Constitutional: Positive for fatigue. Negative for chills, fever and unexpected weight change.        Feeling poorly   HENT: Negative for ear pain, hearing loss, nosebleeds, rhinorrhea and sore throat.    Eyes: Negative for pain,  discharge, redness and itching.   Respiratory: Negative for cough, shortness of breath and wheezing.    Cardiovascular: Negative for chest pain, palpitations and leg swelling.   Gastrointestinal: Negative for abdominal pain, blood in stool and constipation.   Endocrine: Negative for cold intolerance, heat intolerance and polydipsia.   Genitourinary: Negative for dysuria, frequency, hematuria, pelvic pain, vaginal bleeding and vaginal discharge.   Musculoskeletal: Positive for arthralgias. Negative for gait problem, joint swelling and myalgias.   Skin: Negative for rash.   Allergic/Immunologic: Negative.    Neurological: Negative for dizziness, syncope, weakness, numbness and headaches.   Hematological: Negative for adenopathy. Does not bruise/bleed easily.   Psychiatric/Behavioral: Negative for sleep disturbance and suicidal ideas. The patient is not nervous/anxious.         Physical Exam    /70   Pulse 65   Wt 74.7 kg (164 lb 9.6 oz)   LMP  (LMP Unknown) Comment: Last pap 1/2020 by JEREMIE Watson  SpO2 98%   BMI 31.10 kg/m² Body mass index is 31.1 kg/m².  Physical Exam   Constitutional: She is oriented to person, place, and time. She appears well-developed. No distress.   HENT:   Head: Normocephalic.   Right Ear: External ear normal.   Left Ear: External ear normal.   Mouth/Throat: No oropharyngeal exudate.   Eyes: Conjunctivae and lids are normal. Right eye exhibits no discharge. Left eye exhibits no discharge. Right pupil is reactive. Left pupil is reactive.   Neck: No JVD present. No tracheal deviation present. No thyroid mass and no thyromegaly present.   Cardiovascular: Normal rate, regular rhythm and normal heart sounds.   No murmur heard.  Pulmonary/Chest: Effort normal and breath sounds normal. No respiratory distress. She has no wheezes.   Abdominal: Soft. Bowel sounds are normal. There is no abdominal tenderness.   Musculoskeletal: No tenderness.    Zeinab had a diabetic foot exam performed  today.   During the foot exam she had a monofilament test performed.    Neurological Sensory Findings - Unaltered hot/cold right ankle/foot discrimination and unaltered hot/cold left ankle/foot discrimination. Unaltered sharp/dull right ankle/foot discrimination and unaltered sharp/dull left ankle/foot discrimination. No right ankle/foot altered proprioception and no left ankle/foot altered proprioception  Vascular Status -  Her right foot exhibits normal foot vasculature  and no edema. Her left foot exhibits normal foot vasculature  and no edema.  Skin Integrity  -  Her right foot skin is intact.  She has no right foot onychomycosis, no right foot ulcer and non-callous right foot.Her left foot skin is intact. She has no left foot onychomycosis, no left foot ulcer and non-callous left foot..  Lymphadenopathy:     She has no cervical adenopathy.   Neurological: She is alert and oriented to person, place, and time.   Skin: Skin is warm and dry. No rash noted. She is not diaphoretic. No erythema.   Psychiatric: Her speech is normal and behavior is normal. Thought content normal.       Labs and Imaging   Lab Results   Component Value Date    HGBA1C 6.1 01/18/2021    HGBA1C 6.3 09/16/2020    HGBA1C 6.4 06/15/2020     Office Visit on 01/18/2021   Component Date Value Ref Range Status   • Glucose 01/18/2021 124  70 - 130 mg/dL Final   • Hemoglobin A1C 01/18/2021 6.1  % Final     No images are attached to the encounter or orders placed in the encounter.  No Images in the past 120 days found..    Assessment / Plan   Diagnoses and all orders for this visit:    1. Controlled type 2 diabetes mellitus without complication, with long-term current use of insulin (CMS/[facility]) (Primary)  -     POC Glucose, Blood  -     POC Glycosylated Hemoglobin (Hb A1C)    2. Mixed hyperlipidemia        Diabetes Mellitus 2 is under good control.  -A1c 6.1, no hypoglycemia  -continue metformin ER 500mg 2 pills 2x/day.  -continue Jardiance 10mg daily.    -continue basaglar 45u qHS  -Continue Januvia 100mg daily  -discussed changing basaglar and januvia to soliqua, but pt declines as it causes some GI side effects for her   -Continue glipizide 10mg 2x/day AC  -discussed watching carb intake, continue exercise    1.  Diet: 3-4 carb servings per meal for females, 4-5 carb servings per meal for males  Spread carb intake throughout the day  Increase lean protein and vegetable intake  Avoid sugary drinks and processed carbs including crackers, cookies, cakes  2.  Exercise: Recommend at least 30 minutes of exercise daily, at least 5 days per week. Increase exercise gradually.   3.  Blood Glucose Goal: Blood glucose goal <150 fasting, <180 2 hr postprandial  4.  Microalbumin due 7/2021  5.  Education performed during this visit: long term diabetic complications discussed. , annual eye examinations at Ophthalmology discussed, dental hygiene discussed  and foot care reviewed., home glucose monitoring emphasized, all medications, side effects and compliance discussed carefully and Hypoglycemia management and prevention reviewed. Reviewed ‘ABCs’ of diabetes management (respective goals in parentheses):  A1C (<7), blood pressure (<130/80), and cholesterol (LDL <100, if CVD <70).    Hyperlipidemia  -lipid panel utd 7/2020  -continue lipitor 20 mg qhs    There are no Patient Instructions on file for this visit.    Follow up: Return in about 3 months (around 4/18/2021).    Discussed the nature of the disease including, risks, complications, implications, management, safe and proper use of medications. Encouraged therapeutic lifestyle changes including low calorie diet with plenty of fruits and vegetables, daily exercise, medication compliance, and keeping scheduled follow up appointments with me and any other providers. Encouraged patient to have appointment for complete physical, fasting labs, appropriate screenings, and immunizations on an annual basis.        Rosario  TIN Claudio

## 2021-02-03 ENCOUNTER — FLU SHOT (OUTPATIENT)
Dept: INTERNAL MEDICINE | Facility: CLINIC | Age: 66
End: 2021-02-03

## 2021-02-03 DIAGNOSIS — E11.65 UNCONTROLLED TYPE 2 DIABETES MELLITUS WITH HYPERGLYCEMIA (HCC): ICD-10-CM

## 2021-02-03 DIAGNOSIS — Z23 NEED FOR INFLUENZA VACCINATION: Primary | ICD-10-CM

## 2021-02-03 PROCEDURE — 90694 VACC AIIV4 NO PRSRV 0.5ML IM: CPT | Performed by: INTERNAL MEDICINE

## 2021-02-03 PROCEDURE — G0008 ADMIN INFLUENZA VIRUS VAC: HCPCS | Performed by: INTERNAL MEDICINE

## 2021-02-03 RX ORDER — INSULIN GLARGINE 100 [IU]/ML
INJECTION, SOLUTION SUBCUTANEOUS
Qty: 45 ML | Refills: 1 | Status: SHIPPED | OUTPATIENT
Start: 2021-02-03 | End: 2021-04-19 | Stop reason: ALTCHOICE

## 2021-02-03 NOTE — TELEPHONE ENCOUNTER
PATIENT NEEDS REFILLS ON HER LANTUS MEDICATION. PHARMACY SENT REQUEST BUT HAS NOT RECEIVED THE PRESCRIPTION WITH REFILLS..

## 2021-02-25 NOTE — TELEPHONE ENCOUNTER
Caller: Zeinab Rodriguez    Relationship: Self    Best call back number: [phone]     Medication needed:   Requested Prescriptions     Pending Prescriptions Disp Refills   • atenolol (TENORMIN) 100 MG tablet 90 tablet 1     Sig: Take 1 tablet by mouth Daily.       When do you need the refill by: TODAY    What details did the patient provide when requesting the medication: WILL RUN OUT SATURDAY    Does the patient have less than a 3 day supply:  [x] Yes  [] No    What is the patient's preferred pharmacy: [facility] - [address] - [phone] [facility] [phone] FX

## 2021-02-26 RX ORDER — ATENOLOL 100 MG/1
100 TABLET ORAL DAILY
Qty: 90 TABLET | Refills: 1 | Status: SHIPPED | OUTPATIENT
Start: 2021-02-26 | End: 2021-09-01 | Stop reason: SDUPTHER

## 2021-03-02 DIAGNOSIS — E11.9 TYPE 2 DIABETES MELLITUS WITHOUT COMPLICATION, WITHOUT LONG-TERM CURRENT USE OF INSULIN (HCC): ICD-10-CM

## 2021-03-02 DIAGNOSIS — F32.89 OTHER DEPRESSION: ICD-10-CM

## 2021-03-02 DIAGNOSIS — F41.9 ANXIETY: ICD-10-CM

## 2021-03-02 DIAGNOSIS — E78.5 HYPERLIPIDEMIA, UNSPECIFIED HYPERLIPIDEMIA TYPE: ICD-10-CM

## 2021-03-02 RX ORDER — ATORVASTATIN CALCIUM 20 MG/1
20 TABLET, FILM COATED ORAL NIGHTLY
Qty: 90 TABLET | Refills: 1 | Status: SHIPPED | OUTPATIENT
Start: 2021-03-02 | End: 2021-09-01 | Stop reason: SDUPTHER

## 2021-03-02 RX ORDER — PAROXETINE HYDROCHLORIDE 20 MG/1
20 TABLET, FILM COATED ORAL EVERY MORNING
Qty: 90 TABLET | Refills: 1 | Status: SHIPPED | OUTPATIENT
Start: 2021-03-02 | End: 2021-09-09 | Stop reason: SDUPTHER

## 2021-03-17 DIAGNOSIS — E11.65 UNCONTROLLED TYPE 2 DIABETES MELLITUS WITH HYPERGLYCEMIA (HCC): ICD-10-CM

## 2021-03-17 RX ORDER — METFORMIN HYDROCHLORIDE 500 MG/1
1000 TABLET, EXTENDED RELEASE ORAL 2 TIMES DAILY
Qty: 360 TABLET | Refills: 1 | Status: SHIPPED | OUTPATIENT
Start: 2021-03-17 | End: 2021-10-13 | Stop reason: SDUPTHER

## 2021-04-19 ENCOUNTER — OFFICE VISIT (OUTPATIENT)
Dept: ENDOCRINOLOGY | Facility: CLINIC | Age: 66
End: 2021-04-19

## 2021-04-19 VITALS
BODY MASS INDEX: 30.66 KG/M2 | HEART RATE: 60 BPM | SYSTOLIC BLOOD PRESSURE: 132 MMHG | DIASTOLIC BLOOD PRESSURE: 82 MMHG | HEIGHT: 61 IN | OXYGEN SATURATION: 98 % | WEIGHT: 162.4 LBS | TEMPERATURE: 97.5 F

## 2021-04-19 DIAGNOSIS — E78.2 MIXED HYPERLIPIDEMIA: Chronic | ICD-10-CM

## 2021-04-19 DIAGNOSIS — Z79.4 CONTROLLED TYPE 2 DIABETES MELLITUS WITHOUT COMPLICATION, WITH LONG-TERM CURRENT USE OF INSULIN (HCC): Primary | Chronic | ICD-10-CM

## 2021-04-19 DIAGNOSIS — E55.9 VITAMIN D DEFICIENCY: Chronic | ICD-10-CM

## 2021-04-19 DIAGNOSIS — E11.9 CONTROLLED TYPE 2 DIABETES MELLITUS WITHOUT COMPLICATION, WITH LONG-TERM CURRENT USE OF INSULIN (HCC): Primary | Chronic | ICD-10-CM

## 2021-04-19 LAB
GLUCOSE BLDC GLUCOMTR-MCNC: 173 MG/DL (ref 70–130)
HBA1C MFR BLD: 6.3 %

## 2021-04-19 PROCEDURE — 99214 OFFICE O/P EST MOD 30 MIN: CPT | Performed by: PHYSICIAN ASSISTANT

## 2021-04-19 PROCEDURE — 82947 ASSAY GLUCOSE BLOOD QUANT: CPT | Performed by: PHYSICIAN ASSISTANT

## 2021-04-19 PROCEDURE — 83036 HEMOGLOBIN GLYCOSYLATED A1C: CPT | Performed by: PHYSICIAN ASSISTANT

## 2021-04-19 RX ORDER — INSULIN DETEMIR 100 [IU]/ML
45 INJECTION, SOLUTION SUBCUTANEOUS NIGHTLY
Qty: 45 ML | Refills: 1
Start: 2021-04-19 | End: 2021-10-28 | Stop reason: SDUPTHER

## 2021-04-19 RX ORDER — EMPAGLIFLOZIN 10 MG/1
10 TABLET, FILM COATED ORAL DAILY
Qty: 90 TABLET | Refills: 1 | Status: SHIPPED | OUTPATIENT
Start: 2021-04-19 | End: 2021-11-10 | Stop reason: SDUPTHER

## 2021-04-19 NOTE — PROGRESS NOTES
"Chief Complaint  F/u for Diabetes Mellitus.    HPI   Zeinab Rodriguez is a 65 y.o. female who is here today for f/u of Diabetes Mellitus type 2. The initial diagnosis of diabetes was made approximately 2009.     Eye exam 3/2021- [facility], Dr. Akbar, no retinopathy, cataract surgery schedule 5/2021    A1C- 6.3 (4/19/2021), 6.1 (1/18/2021), 6.3 (9/16/2020), 6.4 (6/15/2020), 7.6 (3/11/2020), 6.6 (12/11/19), 9.9 (9/24/19), 9.5 (6/6/19)    Diabetic complications: peripheral neuropathy- tingling  Eye exam current (within one year): utd  Foot care and dental care: discussed    Current diabetic medications include:  Metformin ER 500mg 2 tab BID - tolerating better than immediate release  Glipizide 10mg BID  Januvia 100mg QD - expensive   Jardiance 10mg daily  Levemir 45U QHS    Statin: lipitor 20    Past medications: none    Diabetic Monitoring  -   BS log reviewed- checking 3-4 times per week fast and throughout the day, reading range from 90s to 190s. No hypoglycemia    Nutrition:     Current diet: in general, an \"unhealthy\" diet, on average, 3 meals per day  Current exercise: none    The following portions of the patient's history were reviewed and updated by me as appropriate: allergies, current medications, past family history, past social history, past surgical history and problem list.      Past Medical History:   Diagnosis Date   • Anxiety    • Breast cancer (CMS/[facility])    • Depression    • Diabetes mellitus (CMS/[facility])    • Glaucoma    • Hypertension    • Myopic degeneration        Medications    Current Outpatient Medications:   •  amLODIPine (NORVASC) 5 MG tablet, Take 1 tablet by mouth Daily., Disp: 90 tablet, Rfl: 1  •  aspirin 81 MG tablet, Take  by mouth daily., Disp: , Rfl:   •  atenolol (TENORMIN) 100 MG tablet, Take 1 tablet by mouth Daily., Disp: 90 tablet, Rfl: 1  •  atorvastatin (LIPITOR) 20 MG tablet, Take 1 tablet by mouth Every Night., Disp: 90 tablet, Rfl: 1  •  CILOXAN 0.3 % ophthalmic " ointment, , Disp: , Rfl:   •  dorzolamide-timolol (COSOPT) 22.3-6.8 MG/ML ophthalmic solution, Apply  to eye 2 (two) times a day., Disp: , Rfl:   •  Empagliflozin (Jardiance) 10 MG tablet, Take 10 mg by mouth Daily., Disp: 90 tablet, Rfl: 1  •  glipizide (GLUCOTROL) 10 MG tablet, Take one tablet by mouth twice daily before food, Disp: 180 tablet, Rfl: 1  •  glucose blood (TRUE METRIX BLOOD GLUCOSE TEST) test strip, Use to test blood glucose once daily and as needed, Disp: 100 each, Rfl: 3  •  hydroCHLOROthiazide (HYDRODIURIL) 25 MG tablet, Take 1 tablet by mouth Daily., Disp: 90 tablet, Rfl: 1  •  Insulin Pen Needle 32G X 4 MM misc, Use daily with insulin, Disp: 50 each, Rfl: 11  •  Lancets misc, USE TO TEST ONCE DAILY AND AS NEEDED, Disp: 100 each, Rfl: 3  •  latanoprost (XALATAN) 0.005 % ophthalmic solution, , Disp: , Rfl:   •  lisinopril (PRINIVIL,ZESTRIL) 40 MG tablet, Take 1 tablet by mouth Daily., Disp: 90 tablet, Rfl: 1  •  metFORMIN ER (GLUCOPHAGE-XR) 500 MG 24 hr tablet, Take 2 tablets by mouth 2 (Two) Times a Day., Disp: 360 tablet, Rfl: 1  •  metroNIDAZOLE (METROGEL) 1 % gel, Apply  topically Daily., Disp: 60 g, Rfl: 3  •  Multiple Vitamins-Minerals (ICAPS AREDS FORMULA PO), Take  by mouth 2 (two) times a day., Disp: , Rfl:   •  PARoxetine (PAXIL) 20 MG tablet, Take 1 tablet by mouth Every Morning., Disp: 90 tablet, Rfl: 1  •  SITagliptin (Januvia) 100 MG tablet, Take 1 tablet by mouth Daily., Disp: 90 tablet, Rfl: 1  •  insulin detemir (Levemir FlexTouch) 100 UNIT/ML injection, Inject 45 Units under the skin into the appropriate area as directed Every Night., Disp: 45 mL, Rfl: 1  •  vitamin D (ERGOCALCIFEROL) 1.25 MG (47585 UT) capsule capsule, Take 1 capsule by mouth 1 (One) Time Per Week., Disp: 15 capsule, Rfl: 1    Review of Systems  Review of Systems   Constitutional: Positive for fatigue. Negative for chills, fever and unexpected weight change.        Feeling poorly   HENT: Negative for ear pain,  "hearing loss, nosebleeds, rhinorrhea and sore throat.    Eyes: Negative for pain, discharge, redness and itching.   Respiratory: Negative for cough, shortness of breath and wheezing.    Cardiovascular: Negative for chest pain, palpitations and leg swelling.   Gastrointestinal: Negative for abdominal pain, blood in stool and constipation.   Endocrine: Negative for cold intolerance, heat intolerance and polydipsia.   Genitourinary: Negative for dysuria, frequency, hematuria, pelvic pain, vaginal bleeding and vaginal discharge.   Musculoskeletal: Positive for arthralgias. Negative for gait problem, joint swelling and myalgias.   Skin: Negative for rash.   Allergic/Immunologic: Negative.    Neurological: Negative for dizziness, syncope, weakness, numbness and headaches.   Hematological: Negative for adenopathy. Does not bruise/bleed easily.   Psychiatric/Behavioral: Negative for sleep disturbance and suicidal ideas. The patient is not nervous/anxious.         Physical Exam    /82   Pulse 60   Temp 97.5 °F (36.4 °C)   Ht 154.9 cm (61\")   Wt 73.7 kg (162 lb 6.4 oz)   LMP  (LMP Unknown) Comment: Last pap 1/2020 by JEREMIE Watson  SpO2 98%   BMI 30.69 kg/m² Body mass index is 30.69 kg/m².  Physical Exam   Constitutional: She is oriented to person, place, and time. She appears well-developed. No distress.   HENT:   Head: Normocephalic.   Right Ear: External ear normal.   Left Ear: External ear normal.   Mouth/Throat: No oropharyngeal exudate.   Eyes: Conjunctivae and lids are normal. Right eye exhibits no discharge. Left eye exhibits no discharge. Right pupil is reactive. Left pupil is reactive.   Neck: No JVD present. No tracheal deviation present. No thyroid mass and no thyromegaly present.   Cardiovascular: Normal rate, regular rhythm and normal heart sounds.   No murmur heard.  Pulmonary/Chest: Effort normal and breath sounds normal. No respiratory distress. She has no wheezes.   Abdominal: Soft. Bowel " sounds are normal. There is no abdominal tenderness.   Musculoskeletal: No tenderness.   Lymphadenopathy:     She has no cervical adenopathy.   Neurological: She is alert and oriented to person, place, and time.   Skin: Skin is warm and dry. No rash noted. She is not diaphoretic. No erythema.   Psychiatric: Her speech is normal and behavior is normal. Thought content normal.       Labs and Imaging   Lab Results   Component Value Date    HGBA1C 6.3 04/19/2021    HGBA1C 6.1 01/18/2021    HGBA1C 6.3 09/16/2020     Office Visit on 04/19/2021   Component Date Value Ref Range Status   • Glucose 04/19/2021 173* 70 - 130 mg/dL Final   • Hemoglobin A1C 04/19/2021 6.3  % Corrected     No images are attached to the encounter or orders placed in the encounter.  No Images in the past 120 days found..    Assessment / Plan   Diagnoses and all orders for this visit:    1. Controlled type 2 diabetes mellitus without complication, with long-term current use of insulin (CMS/[facility]) (Primary)  -     POC Glucose, Blood  -     POC Glycosylated Hemoglobin (Hb A1C)  -     Empagliflozin (Jardiance) 10 MG tablet; Take 10 mg by mouth Daily.  Dispense: 90 tablet; Refill: 1  -     SITagliptin (Januvia) 100 MG tablet; Take 1 tablet by mouth Daily.  Dispense: 90 tablet; Refill: 1  -     insulin detemir (Levemir FlexTouch) 100 UNIT/ML injection; Inject 45 Units under the skin into the appropriate area as directed Every Night.  Dispense: 45 mL; Refill: 1  -     Microalbumin / Creatinine Urine Ratio - Urine, Clean Catch  -     Comprehensive Metabolic Panel  -     Lipid Panel  -     TSH    2. Mixed hyperlipidemia  -     Comprehensive Metabolic Panel  -     Lipid Panel    3. Vitamin D deficiency  -     Vitamin D 25 Hydroxy        Diabetes Mellitus 2 is under good control.  -A1c 6.3, no hypoglycemia  -continue metformin ER 500mg 2 pills 2x/day.  -continue Jardiance 10mg daily.   -continue levemir 45u qHS  -Continue Januvia 100mg daily  -Continue glipizide  10mg 2x/day AC  -discussed watching carb intake, continue exercise  -eye exam was done 3/2021, foot exam updated 1/2021, labs today    1.  Diet: 3-4 carb servings per meal for females, 4-5 carb servings per meal for males  Spread carb intake throughout the day  Increase lean protein and vegetable intake  Avoid sugary drinks and processed carbs including crackers, cookies, cakes  2.  Exercise: Recommend at least 30 minutes of exercise daily, at least 5 days per week. Increase exercise gradually.   3.  Blood Glucose Goal: Blood glucose goal <150 fasting, <180 2 hr postprandial  4.  Microalbumin due 7/2021  5.  Education performed during this visit: long term diabetic complications discussed. , annual eye examinations at Ophthalmology discussed, dental hygiene discussed  and foot care reviewed., home glucose monitoring emphasized, all medications, side effects and compliance discussed carefully and Hypoglycemia management and prevention reviewed. Reviewed ‘ABCs’ of diabetes management (respective goals in parentheses):  A1C (<7), blood pressure (<130/80), and cholesterol (LDL <100, if CVD <70).    Hyperlipidemia  -repeat lipid panel  -continue lipitor 20 mg qhs    Vit d deficiency  -currently on high dose weekly supplement  -repeat level today    There are no Patient Instructions on file for this visit.    Follow up: Return in about 3 months (around 7/19/2021).    Discussed the nature of the disease including, risks, complications, implications, management, safe and proper use of medications. Encouraged therapeutic lifestyle changes including low calorie diet with plenty of fruits and vegetables, daily exercise, medication compliance, and keeping scheduled follow up appointments with me and any other providers. Encouraged patient to have appointment for complete physical, fasting labs, appropriate screenings, and immunizations on an annual basis.        Rosario Claudio PA-C

## 2021-04-20 LAB
25(OH)D3+25(OH)D2 SERPL-MCNC: 42.8 NG/ML (ref 30–100)
ALBUMIN SERPL-MCNC: 4.3 G/DL (ref 3.8–4.8)
ALBUMIN/GLOB SERPL: 1.7 {RATIO} (ref 1.2–2.2)
ALP SERPL-CCNC: 77 IU/L (ref 39–117)
ALT SERPL-CCNC: 18 IU/L (ref 0–32)
AST SERPL-CCNC: 22 IU/L (ref 0–40)
BILIRUB SERPL-MCNC: 0.4 MG/DL (ref 0–1.2)
BUN SERPL-MCNC: 19 MG/DL (ref 8–27)
BUN/CREAT SERPL: 27 (ref 12–28)
CALCIUM SERPL-MCNC: 9.9 MG/DL (ref 8.7–10.3)
CHLORIDE SERPL-SCNC: 105 MMOL/L (ref 96–106)
CHOLEST SERPL-MCNC: 110 MG/DL (ref 100–199)
CO2 SERPL-SCNC: 29 MMOL/L (ref 20–29)
CREAT SERPL-MCNC: 0.71 MG/DL (ref 0.57–1)
GLOBULIN SER CALC-MCNC: 2.5 G/DL (ref 1.5–4.5)
GLUCOSE SERPL-MCNC: 103 MG/DL (ref 65–99)
HDLC SERPL-MCNC: 36 MG/DL
LDLC SERPL CALC-MCNC: 48 MG/DL (ref 0–99)
POTASSIUM SERPL-SCNC: 4.2 MMOL/L (ref 3.5–5.2)
PROT SERPL-MCNC: 6.8 G/DL (ref 6–8.5)
SODIUM SERPL-SCNC: 146 MMOL/L (ref 134–144)
TRIGL SERPL-MCNC: 151 MG/DL (ref 0–149)
TSH SERPL DL<=0.005 MIU/L-ACNC: 1.07 UIU/ML (ref 0.45–4.5)
VLDLC SERPL CALC-MCNC: 26 MG/DL (ref 5–40)

## 2021-05-13 ENCOUNTER — TELEPHONE (OUTPATIENT)
Dept: ENDOCRINOLOGY | Facility: CLINIC | Age: 66
End: 2021-05-13

## 2021-05-13 NOTE — TELEPHONE ENCOUNTER
Pt is having cataract surgery on Tuesday and she needs to know how to take her medications      pts number is  [phone] or cell [phone]

## 2021-05-14 NOTE — TELEPHONE ENCOUNTER
Pt was informed of all instructions for medication before and after surgery and she voiced understanding.

## 2021-05-16 ENCOUNTER — APPOINTMENT (OUTPATIENT)
Dept: PREADMISSION TESTING | Facility: HOSPITAL | Age: 66
End: 2021-05-16

## 2021-05-16 LAB — SARS-COV-2 RNA NOSE QL NAA+PROBE: NOT DETECTED

## 2021-05-16 PROCEDURE — C9803 HOPD COVID-19 SPEC COLLECT: HCPCS

## 2021-05-16 PROCEDURE — U0004 COV-19 TEST NON-CDC HGH THRU: HCPCS

## 2021-06-03 ENCOUNTER — OFFICE VISIT (OUTPATIENT)
Dept: INTERNAL MEDICINE | Facility: CLINIC | Age: 66
End: 2021-06-03

## 2021-06-03 VITALS
TEMPERATURE: 97.5 F | SYSTOLIC BLOOD PRESSURE: 124 MMHG | WEIGHT: 162 LBS | RESPIRATION RATE: 18 BRPM | HEART RATE: 64 BPM | DIASTOLIC BLOOD PRESSURE: 62 MMHG | BODY MASS INDEX: 30.61 KG/M2

## 2021-06-03 DIAGNOSIS — E78.5 HYPERLIPIDEMIA, UNSPECIFIED HYPERLIPIDEMIA TYPE: Primary | ICD-10-CM

## 2021-06-03 DIAGNOSIS — F41.9 ANXIETY: ICD-10-CM

## 2021-06-03 DIAGNOSIS — I10 ESSENTIAL HYPERTENSION: ICD-10-CM

## 2021-06-03 PROCEDURE — 99214 OFFICE O/P EST MOD 30 MIN: CPT | Performed by: INTERNAL MEDICINE

## 2021-06-03 PROCEDURE — 36415 COLL VENOUS BLD VENIPUNCTURE: CPT | Performed by: INTERNAL MEDICINE

## 2021-06-03 RX ORDER — TRIPROLIDINE/PSEUDOEPHEDRINE 2.5MG-60MG
1 TABLET ORAL
COMMUNITY
Start: 2021-05-11 | End: 2021-11-08

## 2021-06-04 LAB
ALBUMIN SERPL-MCNC: 4.4 G/DL (ref 3.5–5.2)
ALBUMIN/GLOB SERPL: 2.2 G/DL
ALP SERPL-CCNC: 80 U/L (ref 39–117)
ALT SERPL-CCNC: 18 U/L (ref 1–33)
AST SERPL-CCNC: 18 U/L (ref 1–32)
BILIRUB SERPL-MCNC: 0.4 MG/DL (ref 0–1.2)
BUN SERPL-MCNC: 15 MG/DL (ref 8–23)
BUN/CREAT SERPL: 17.9 (ref 7–25)
CALCIUM SERPL-MCNC: 9.9 MG/DL (ref 8.6–10.5)
CHLORIDE SERPL-SCNC: 100 MMOL/L (ref 98–107)
CHOLEST SERPL-MCNC: 100 MG/DL (ref 0–200)
CO2 SERPL-SCNC: 27.5 MMOL/L (ref 22–29)
CREAT SERPL-MCNC: 0.84 MG/DL (ref 0.57–1)
GLOBULIN SER CALC-MCNC: 2 GM/DL
GLUCOSE SERPL-MCNC: 188 MG/DL (ref 65–99)
HDLC SERPL-MCNC: 38 MG/DL (ref 40–60)
LDLC SERPL CALC-MCNC: 35 MG/DL (ref 0–100)
POTASSIUM SERPL-SCNC: 5.1 MMOL/L (ref 3.5–5.2)
PROT SERPL-MCNC: 6.4 G/DL (ref 6–8.5)
SODIUM SERPL-SCNC: 140 MMOL/L (ref 136–145)
TRIGL SERPL-MCNC: 163 MG/DL (ref 0–150)
VLDLC SERPL CALC-MCNC: 27 MG/DL (ref 5–40)

## 2021-06-29 NOTE — PROGRESS NOTES
Chief Complaint   Patient presents with   • Follow-up     4 month follow up chronic medical problems       History of Present Illness    The patient presents for a follow-up related to hypertension. The patient reports that she has had no headaches, chest pain, dyspnea, edema, syncope, blurred vision or palpitations. She states that she is taking her medication as prescribed. She is not having medication side effects.    The patient presents for a follow-up related to hyperlipidemia. She is following a low fat diet. She reports that she is exercising. She is taking atorvastatin. The patient is taking her medication as prescribed. She reports no medication side effects, including muscle cramps, abdominal pain, headaches or weakness. She denies orthopnea, paroxysmal nocturnal dyspnea or dyspnea on exertion.    The patient presents for a follow-up related to anxiety. She denies currently having anxiety symptoms. She is not having panic attacks. Her energy level is good. She denies agorophobia. She sleeps well. She is currently taking a medication. She states that her current anxiety symptoms are stable. The current medication regimen consists of Paxil. The patient denies having medication side effects including nausea, headaches, anxiety, increased depression or fatigue.    Review of Systems    CONSTITUTIONAL- Denies Unexplained Weight Loss, Fever, Chills, Sweats, Weakness or Malaise.    PULMONARY- Denies Wheezing, Sputum Production, Cough, Hemoptysis or Pleuritic Chest Pain.    CARDIOVASCULAR- Denies Claudication or Irregular Heart Beat.    Medications      Current Outpatient Medications:   •  amLODIPine (NORVASC) 5 MG tablet, Take 1 tablet by mouth Daily., Disp: 90 tablet, Rfl: 1  •  aspirin 81 MG tablet, Take  by mouth daily., Disp: , Rfl:   •  atenolol (TENORMIN) 100 MG tablet, Take 1 tablet by mouth Daily., Disp: 90 tablet, Rfl: 1  •  atorvastatin (LIPITOR) 20 MG tablet, Take 1 tablet by mouth Every Night., Disp:  90 tablet, Rfl: 1  •  CILOXAN 0.3 % ophthalmic ointment, , Disp: , Rfl:   •  dorzolamide-timolol (COSOPT) 22.3-6.8 MG/ML ophthalmic solution, Apply  to eye 2 (two) times a day., Disp: , Rfl:   •  Durezol 0.05 % ophthalmic emulsion, Administer 1 drop to the right eye Every 2 (Two) Hours., Disp: , Rfl:   •  Empagliflozin (Jardiance) 10 MG tablet, Take 10 mg by mouth Daily., Disp: 90 tablet, Rfl: 1  •  glipizide (GLUCOTROL) 10 MG tablet, Take one tablet by mouth twice daily before food, Disp: 180 tablet, Rfl: 1  •  glucose blood (TRUE METRIX BLOOD GLUCOSE TEST) test strip, Use to test blood glucose once daily and as needed, Disp: 100 each, Rfl: 3  •  hydroCHLOROthiazide (HYDRODIURIL) 25 MG tablet, Take 1 tablet by mouth Daily., Disp: 90 tablet, Rfl: 1  •  insulin detemir (Levemir FlexTouch) 100 UNIT/ML injection, Inject 45 Units under the skin into the appropriate area as directed Every Night., Disp: 45 mL, Rfl: 1  •  Insulin Pen Needle 32G X 4 MM misc, Use daily with insulin, Disp: 50 each, Rfl: 11  •  Lancets misc, USE TO TEST ONCE DAILY AND AS NEEDED, Disp: 100 each, Rfl: 3  •  latanoprost (XALATAN) 0.005 % ophthalmic solution, , Disp: , Rfl:   •  lisinopril (PRINIVIL,ZESTRIL) 40 MG tablet, Take 1 tablet by mouth Daily., Disp: 90 tablet, Rfl: 1  •  metFORMIN ER (GLUCOPHAGE-XR) 500 MG 24 hr tablet, Take 2 tablets by mouth 2 (Two) Times a Day., Disp: 360 tablet, Rfl: 1  •  metroNIDAZOLE (METROGEL) 1 % gel, Apply  topically Daily., Disp: 60 g, Rfl: 3  •  Multiple Vitamins-Minerals (ICAPS AREDS FORMULA PO), Take  by mouth 2 (two) times a day., Disp: , Rfl:   •  PARoxetine (PAXIL) 20 MG tablet, Take 1 tablet by mouth Every Morning., Disp: 90 tablet, Rfl: 1  •  SITagliptin (Januvia) 100 MG tablet, Take 1 tablet by mouth Daily., Disp: 90 tablet, Rfl: 1  •  vitamin D (ERGOCALCIFEROL) 1.25 MG (32813 UT) capsule capsule, Take 1 capsule by mouth 1 (One) Time Per Week., Disp: 15 capsule, Rfl: 1     Allergies    No Known  Allergies    Problem List    Patient Active Problem List   Diagnosis   • Abnormal mammogram   • Anemia   • Depression   • Uncontrolled type 2 diabetes mellitus with hyperglycemia (CMS/[facility])   • Mixed hyperlipidemia   • Hypertension   • Anxiety       Medications, Allergies, Problems List and Past History were reviewed and updated.    Physical Examination    /62 (BP Location: Right arm, Patient Position: Sitting, Cuff Size: Adult)   Pulse 64   Temp 97.5 °F (36.4 °C) (Infrared)   Resp 18   Wt 73.5 kg (162 lb)   LMP  (LMP Unknown) Comment: Last pap 1/2020 by JEREMIE Watson  Breastfeeding No   BMI 30.61 kg/m²     HEENT: Facies- Within normal limits. Lids- Normal bilaterally. Conjunctiva- Clear bilaterally. Sclera- Anicteric bilaterally.    Neck: Thyroid- non enlarged, symmetric and has no nodules. No bruits are detected.    Lungs: Auscultation- Clear to auscultation bilaterally. There are no retractions, clubbing or cyanosis. The Expiratory to Inspiratory ratio is equal.    Cardiovascular: There are no carotid bruits. Heart- Normal Rate with Regular rhythm and no murmurs. There are no gallops. There are no rubs. In the lower extremities there is no edema. The upper extremities do not have edema.    Abdomen: Soft, benign, non-tender with no masses, hernias, organomegaly or scars.    Impression and Assessment    Essential Hypertension.    Hyperlipidemia.    Anxiety Disorder Unspecified.    Plan    Essential Hypertension Plan: The patient was instructed to continue the current medications.    Hyperlipidemia Plan: The patient was instructed to exercise daily, eat a low fat diet and continue her medications.    Anxiety Disorder Unspecified Plan: The patient was instructed to continue the current medications.    Diagnoses and all orders for this visit:    1. Hyperlipidemia, unspecified hyperlipidemia type (Primary)  -     Comprehensive Metabolic Panel  -     Lipid Panel    2. Essential hypertension  -      Comprehensive Metabolic Panel    3. Anxiety          Return to Office    The patient was instructed to return for follow-up in 4 months. The patient was instructed to return sooner if the condition changes, worsens, or does not resolve.

## 2021-07-02 RX ORDER — HYDROCHLOROTHIAZIDE 25 MG/1
25 TABLET ORAL DAILY
Qty: 90 TABLET | Refills: 1 | Status: SHIPPED | OUTPATIENT
Start: 2021-07-02 | End: 2022-01-05 | Stop reason: SDUPTHER

## 2021-07-02 RX ORDER — AMLODIPINE BESYLATE 5 MG/1
5 TABLET ORAL DAILY
Qty: 90 TABLET | Refills: 1 | Status: SHIPPED | OUTPATIENT
Start: 2021-07-02 | End: 2022-01-05 | Stop reason: SDUPTHER

## 2021-07-02 RX ORDER — LISINOPRIL 40 MG/1
40 TABLET ORAL DAILY
Qty: 90 TABLET | Refills: 1 | Status: SHIPPED | OUTPATIENT
Start: 2021-07-02 | End: 2022-01-05 | Stop reason: SDUPTHER

## 2021-07-02 NOTE — TELEPHONE ENCOUNTER
Caller: Zeinab Rodriguez    Relationship: Self    Best call back number:     Medication needed:   Requested Prescriptions     Pending Prescriptions Disp Refills   • lisinopril (PRINIVIL,ZESTRIL) 40 MG tablet 90 tablet 1     Sig: Take 1 tablet by mouth Daily.   • amLODIPine (NORVASC) 5 MG tablet 90 tablet 1     Sig: Take 1 tablet by mouth Daily.   • hydroCHLOROthiazide (HYDRODIURIL) 25 MG tablet 90 tablet 1     Sig: Take 1 tablet by mouth Daily.       When do you need the refill by: ASAP    What additional details did the patient provide when requesting the medication: REQUESTING 90 DAY SUPPLY    Does the patient have less than a 3 day supply:  [] Yes  [x] No    What is the patient's preferred pharmacy: [facility] - [address] [phone] [facility] [phone]

## 2021-07-13 DIAGNOSIS — Z79.4 CONTROLLED TYPE 2 DIABETES MELLITUS WITHOUT COMPLICATION, WITH LONG-TERM CURRENT USE OF INSULIN (HCC): ICD-10-CM

## 2021-07-13 DIAGNOSIS — E11.9 CONTROLLED TYPE 2 DIABETES MELLITUS WITHOUT COMPLICATION, WITH LONG-TERM CURRENT USE OF INSULIN (HCC): ICD-10-CM

## 2021-07-13 RX ORDER — GLIPIZIDE 10 MG/1
TABLET ORAL
Qty: 180 TABLET | Refills: 0 | Status: SHIPPED | OUTPATIENT
Start: 2021-07-13 | End: 2021-10-20 | Stop reason: SDUPTHER

## 2021-09-01 ENCOUNTER — TELEPHONE (OUTPATIENT)
Dept: INTERNAL MEDICINE | Facility: CLINIC | Age: 66
End: 2021-09-01

## 2021-09-01 DIAGNOSIS — E78.5 HYPERLIPIDEMIA, UNSPECIFIED HYPERLIPIDEMIA TYPE: ICD-10-CM

## 2021-09-01 DIAGNOSIS — E11.9 TYPE 2 DIABETES MELLITUS WITHOUT COMPLICATION, WITHOUT LONG-TERM CURRENT USE OF INSULIN (HCC): ICD-10-CM

## 2021-09-01 RX ORDER — ATORVASTATIN CALCIUM 20 MG/1
20 TABLET, FILM COATED ORAL NIGHTLY
Qty: 90 TABLET | Refills: 0 | Status: SHIPPED | OUTPATIENT
Start: 2021-09-01 | End: 2021-12-01 | Stop reason: SDUPTHER

## 2021-09-01 RX ORDER — ATENOLOL 100 MG/1
100 TABLET ORAL DAILY
Qty: 90 TABLET | Refills: 0 | Status: SHIPPED | OUTPATIENT
Start: 2021-09-01 | End: 2021-12-01 | Stop reason: SDUPTHER

## 2021-09-01 NOTE — TELEPHONE ENCOUNTER
Caller: JenniferZeinab    Relationship: Self    Best call back number: [phone]     Medication needed:   Requested Prescriptions     Pending Prescriptions Disp Refills   • atenolol (TENORMIN) 100 MG tablet 90 tablet 1     Sig: Take 1 tablet by mouth Daily.   • atorvastatin (LIPITOR) 20 MG tablet 90 tablet 1     Sig: Take 1 tablet by mouth Every Night.       When do you need the refill by:09/02/21    What additional details did the patient provide when requesting the medication:    Does the patient have less than a 3 day supply:  [x] Yes  [] No    What is the patient's preferred pharmacy: [facility] - [address] - [phone] [facility] [phone] FX

## 2021-09-09 DIAGNOSIS — F32.89 OTHER DEPRESSION: ICD-10-CM

## 2021-09-09 DIAGNOSIS — F41.9 ANXIETY: ICD-10-CM

## 2021-09-09 RX ORDER — PAROXETINE HYDROCHLORIDE 20 MG/1
20 TABLET, FILM COATED ORAL EVERY MORNING
Qty: 90 TABLET | Refills: 1 | Status: SHIPPED | OUTPATIENT
Start: 2021-09-09 | End: 2022-03-16 | Stop reason: SDUPTHER

## 2021-10-13 DIAGNOSIS — L71.9 ROSACEA: ICD-10-CM

## 2021-10-13 DIAGNOSIS — E11.65 UNCONTROLLED TYPE 2 DIABETES MELLITUS WITH HYPERGLYCEMIA (HCC): ICD-10-CM

## 2021-10-13 RX ORDER — METFORMIN HYDROCHLORIDE 500 MG/1
1000 TABLET, EXTENDED RELEASE ORAL 2 TIMES DAILY
Qty: 360 TABLET | Refills: 1 | Status: SHIPPED | OUTPATIENT
Start: 2021-10-13 | End: 2022-04-25 | Stop reason: SDUPTHER

## 2021-10-20 DIAGNOSIS — Z79.4 CONTROLLED TYPE 2 DIABETES MELLITUS WITHOUT COMPLICATION, WITH LONG-TERM CURRENT USE OF INSULIN (HCC): Chronic | ICD-10-CM

## 2021-10-20 DIAGNOSIS — E11.9 CONTROLLED TYPE 2 DIABETES MELLITUS WITHOUT COMPLICATION, WITH LONG-TERM CURRENT USE OF INSULIN (HCC): Chronic | ICD-10-CM

## 2021-10-20 RX ORDER — GLIPIZIDE 10 MG/1
TABLET ORAL
Qty: 180 TABLET | Refills: 0 | Status: SHIPPED | OUTPATIENT
Start: 2021-10-20 | End: 2021-11-08 | Stop reason: SDUPTHER

## 2021-10-28 DIAGNOSIS — E11.9 CONTROLLED TYPE 2 DIABETES MELLITUS WITHOUT COMPLICATION, WITH LONG-TERM CURRENT USE OF INSULIN (HCC): Chronic | ICD-10-CM

## 2021-10-28 DIAGNOSIS — Z79.4 CONTROLLED TYPE 2 DIABETES MELLITUS WITHOUT COMPLICATION, WITH LONG-TERM CURRENT USE OF INSULIN (HCC): Chronic | ICD-10-CM

## 2021-10-28 RX ORDER — INSULIN DETEMIR 100 [IU]/ML
45 INJECTION, SOLUTION SUBCUTANEOUS NIGHTLY
Qty: 45 ML | Refills: 0
Start: 2021-10-28 | End: 2021-10-29 | Stop reason: SDUPTHER

## 2021-10-29 DIAGNOSIS — Z79.4 CONTROLLED TYPE 2 DIABETES MELLITUS WITHOUT COMPLICATION, WITH LONG-TERM CURRENT USE OF INSULIN (HCC): Chronic | ICD-10-CM

## 2021-10-29 DIAGNOSIS — E11.9 CONTROLLED TYPE 2 DIABETES MELLITUS WITHOUT COMPLICATION, WITH LONG-TERM CURRENT USE OF INSULIN (HCC): Chronic | ICD-10-CM

## 2021-10-29 RX ORDER — INSULIN DETEMIR 100 [IU]/ML
45 INJECTION, SOLUTION SUBCUTANEOUS NIGHTLY
Qty: 45 ML | Refills: 0 | Status: SHIPPED | OUTPATIENT
Start: 2021-10-29 | End: 2022-01-04 | Stop reason: SDUPTHER

## 2021-10-29 RX ORDER — INSULIN DETEMIR 100 [IU]/ML
45 INJECTION, SOLUTION SUBCUTANEOUS NIGHTLY
Qty: 45 ML | Refills: 0 | Status: SHIPPED | OUTPATIENT
Start: 2021-10-29 | End: 2021-10-29 | Stop reason: SDUPTHER

## 2021-10-29 NOTE — TELEPHONE ENCOUNTER
SET TO 'NO PRINT'    PLEASE RESEND TO [facility]

## 2021-11-08 ENCOUNTER — OFFICE VISIT (OUTPATIENT)
Dept: ENDOCRINOLOGY | Facility: CLINIC | Age: 66
End: 2021-11-08

## 2021-11-08 ENCOUNTER — LAB (OUTPATIENT)
Dept: LAB | Facility: HOSPITAL | Age: 66
End: 2021-11-08

## 2021-11-08 VITALS
WEIGHT: 160 LBS | OXYGEN SATURATION: 95 % | HEART RATE: 69 BPM | SYSTOLIC BLOOD PRESSURE: 132 MMHG | DIASTOLIC BLOOD PRESSURE: 78 MMHG | HEIGHT: 61 IN | BODY MASS INDEX: 30.21 KG/M2

## 2021-11-08 DIAGNOSIS — E78.2 MIXED HYPERLIPIDEMIA: Chronic | ICD-10-CM

## 2021-11-08 DIAGNOSIS — E11.649 CONTROLLED TYPE 2 DIABETES MELLITUS WITH HYPOGLYCEMIA, WITH LONG-TERM CURRENT USE OF INSULIN (HCC): Primary | Chronic | ICD-10-CM

## 2021-11-08 DIAGNOSIS — Z79.4 CONTROLLED TYPE 2 DIABETES MELLITUS WITH HYPOGLYCEMIA, WITH LONG-TERM CURRENT USE OF INSULIN (HCC): Primary | Chronic | ICD-10-CM

## 2021-11-08 DIAGNOSIS — E55.9 VITAMIN D DEFICIENCY: Chronic | ICD-10-CM

## 2021-11-08 LAB
EXPIRATION DATE: NORMAL
GLUCOSE BLDC GLUCOMTR-MCNC: 167 MG/DL (ref 70–130)
HBA1C MFR BLD: 7.1 %
Lab: NORMAL

## 2021-11-08 PROCEDURE — 3051F HG A1C>EQUAL 7.0%<8.0%: CPT | Performed by: PHYSICIAN ASSISTANT

## 2021-11-08 PROCEDURE — 82947 ASSAY GLUCOSE BLOOD QUANT: CPT | Performed by: PHYSICIAN ASSISTANT

## 2021-11-08 PROCEDURE — 99214 OFFICE O/P EST MOD 30 MIN: CPT | Performed by: PHYSICIAN ASSISTANT

## 2021-11-08 PROCEDURE — 83036 HEMOGLOBIN GLYCOSYLATED A1C: CPT | Performed by: PHYSICIAN ASSISTANT

## 2021-11-08 RX ORDER — GLIPIZIDE 5 MG/1
TABLET ORAL
Qty: 180 TABLET | Refills: 1 | Status: SHIPPED | OUTPATIENT
Start: 2021-11-08 | End: 2022-09-26 | Stop reason: SDUPTHER

## 2021-11-08 RX ORDER — PREDNISOLONE ACETATE 10 MG/ML
SUSPENSION/ DROPS OPHTHALMIC
COMMUNITY
Start: 2021-10-07 | End: 2023-02-22

## 2021-11-08 NOTE — PROGRESS NOTES
Chief Complaint  F/u for Diabetes Mellitus.    HPI   Zeinab Rodriguez is a 66 y.o. female who is here today for f/u of Diabetes Mellitus type 2. The initial diagnosis of diabetes was made approximately 2009.      has been sick and in and out of the hospital recently. Wasn't able to follow her normal diet while he was hospitalized.   Is taking OTC vitamin D.     A1C- 7.1 (11/8/21), 6.3 (4/19/2021), 6.1 (1/18/2021), 6.3 (9/16/2020), 6.4 (6/15/2020), 7.6 (3/11/2020), 6.6 (12/11/19), 9.9 (9/24/19), 9.5 (6/6/19)    Diabetic complications: peripheral neuropathy- tingling  Eye exam current (within one year): [facility]  Foot care and dental care: discussed    Current diabetic medications include:  Metformin ER 500mg 2 tab BID - tolerating better than immediate release  Glipizide 10mg BID  Januvia 100mg QD   Jardiance 10mg daily  Levemir 45U QHS    Statin: lipitor 20    Past medications: none    Diabetic Monitoring  -   No meter or log for review. Pt reports BG readings ~150, reports hypoglycemia symptoms 2-3 times per week (during the day)    The following portions of the patient's history were reviewed and updated by me as appropriate: allergies, current medications, past family history, past social history, past surgical history and problem list.      Past Medical History:   Diagnosis Date   • Anxiety    • Breast cancer (HCC)    • Depression    • Diabetes mellitus (HCC)    • Glaucoma    • Hypertension    • Myopic degeneration        Medications    Current Outpatient Medications:   •  amLODIPine (NORVASC) 5 MG tablet, Take 1 tablet by mouth Daily., Disp: 90 tablet, Rfl: 1  •  aspirin 81 MG tablet, Take  by mouth daily., Disp: , Rfl:   •  atenolol (TENORMIN) 100 MG tablet, Take 1 tablet by mouth Daily., Disp: 90 tablet, Rfl: 0  •  atorvastatin (LIPITOR) 20 MG tablet, Take 1 tablet by mouth Every Night., Disp: 90 tablet, Rfl: 0  •  CILOXAN 0.3 % ophthalmic  ointment, , Disp: , Rfl:   •  dorzolamide-timolol (COSOPT) 22.3-6.8 MG/ML ophthalmic solution, Apply  to eye 2 (two) times a day., Disp: , Rfl:   •  Empagliflozin (Jardiance) 10 MG tablet, Take 10 mg by mouth Daily., Disp: 90 tablet, Rfl: 1  •  glipizide (GLUCOTROL) 5 MG tablet, Take one tablet by mouth twice daily before food, Disp: 180 tablet, Rfl: 1  •  glucose blood (TRUE METRIX BLOOD GLUCOSE TEST) test strip, Use to test blood glucose once daily and as needed, Disp: 100 each, Rfl: 3  •  hydroCHLOROthiazide (HYDRODIURIL) 25 MG tablet, Take 1 tablet by mouth Daily., Disp: 90 tablet, Rfl: 1  •  insulin detemir (Levemir FlexTouch) 100 UNIT/ML injection, Inject 45 Units under the skin into the appropriate area as directed Every Night., Disp: 45 mL, Rfl: 0  •  Insulin Pen Needle 32G X 4 MM misc, Use daily with insulin, Disp: 50 each, Rfl: 11  •  Lancets misc, USE TO TEST ONCE DAILY AND AS NEEDED, Disp: 100 each, Rfl: 3  •  latanoprost (XALATAN) 0.005 % ophthalmic solution, , Disp: , Rfl:   •  lisinopril (PRINIVIL,ZESTRIL) 40 MG tablet, Take 1 tablet by mouth Daily., Disp: 90 tablet, Rfl: 1  •  metFORMIN ER (GLUCOPHAGE-XR) 500 MG 24 hr tablet, Take 2 tablets by mouth 2 (Two) Times a Day., Disp: 360 tablet, Rfl: 1  •  metroNIDAZOLE (METROGEL) 1 % gel, Apply  topically Daily., Disp: 60 g, Rfl: 3  •  Multiple Vitamins-Minerals (ICAPS AREDS FORMULA PO), Take  by mouth 2 (two) times a day., Disp: , Rfl:   •  PARoxetine (PAXIL) 20 MG tablet, Take 1 tablet by mouth Every Morning., Disp: 90 tablet, Rfl: 1  •  prednisoLONE acetate (PRED FORTE) 1 % ophthalmic suspension, , Disp: , Rfl:   •  SITagliptin (Januvia) 100 MG tablet, Take 1 tablet by mouth Daily. DUE APPT, Disp: 90 tablet, Rfl: 0    Review of Systems  Review of Systems   Constitutional: Positive for fatigue. Negative for chills, fever and unexpected weight change.        Feeling poorly   HENT: Negative for ear pain, hearing loss, nosebleeds, rhinorrhea and sore  "throat.    Eyes: Negative for pain, discharge, redness and itching.   Respiratory: Negative for cough, shortness of breath and wheezing.    Cardiovascular: Negative for chest pain, palpitations and leg swelling.   Gastrointestinal: Negative for abdominal pain, blood in stool and constipation.   Endocrine: Negative for cold intolerance, heat intolerance and polydipsia.   Genitourinary: Negative for dysuria, frequency, hematuria, pelvic pain, vaginal bleeding and vaginal discharge.   Musculoskeletal: Positive for arthralgias. Negative for gait problem, joint swelling and myalgias.   Skin: Negative for rash.   Allergic/Immunologic: Negative.    Neurological: Negative for dizziness, syncope, weakness, numbness and headaches.   Hematological: Negative for adenopathy. Does not bruise/bleed easily.   Psychiatric/Behavioral: Negative for sleep disturbance and suicidal ideas. The patient is not nervous/anxious.         Physical Exam    /78   Pulse 69   Ht 154.9 cm (61\")   Wt 72.6 kg (160 lb)   LMP  (LMP Unknown) Comment: Last pap 1/2020 by JEREMIE Watson  SpO2 95%   BMI 30.23 kg/m² Body mass index is 30.23 kg/m².  Physical Exam   Constitutional: She is oriented to person, place, and time. She appears well-developed. No distress.   HENT:   Head: Normocephalic.   Right Ear: External ear normal.   Left Ear: External ear normal.   Mouth/Throat: No oropharyngeal exudate.   Eyes: Conjunctivae and lids are normal. Right eye exhibits no discharge. Left eye exhibits no discharge. Right pupil is reactive. Left pupil is reactive.   Neck: No JVD present. No tracheal deviation present. No thyroid mass and no thyromegaly present.   Cardiovascular: Normal rate, regular rhythm and normal heart sounds.   No murmur heard.  Pulmonary/Chest: Effort normal and breath sounds normal. No respiratory distress. She has no wheezes.   Abdominal: Soft. Bowel sounds are normal. There is no abdominal tenderness.   Musculoskeletal: No " tenderness.   Lymphadenopathy:     She has no cervical adenopathy.   Neurological: She is alert and oriented to person, place, and time.   Skin: Skin is warm and dry. No rash noted. She is not diaphoretic. No erythema.   Psychiatric: Her speech is normal and behavior is normal. Thought content normal.       Labs and Imaging   Lab Results   Component Value Date    HGBA1C 7.1 11/08/2021    HGBA1C 6.3 04/19/2021    HGBA1C 6.1 01/18/2021     Office Visit on 11/08/2021   Component Date Value Ref Range Status   • Glucose 11/08/2021 167* 70 - 130 mg/dL Final   • Hemoglobin A1C 11/08/2021 7.1  % Final   • Lot Number 11/08/2021 [id number]   Final   • Expiration Date 11/08/2021 [id number]   Final     No images are attached to the encounter or orders placed in the encounter.  No Images in the past 120 days found..    Assessment / Plan   Diagnoses and all orders for this visit:    1. Controlled type 2 diabetes mellitus with hypoglycemia, with long-term current use of insulin (HCC) (Primary)  -     POC Glucose, Blood  -     POC Glycosylated Hemoglobin (Hb A1C)  -     Microalbumin / Creatinine Urine Ratio - Urine, Clean Catch  -     Comprehensive Metabolic Panel  -     Lipid Panel  -     TSH  -     T4, Free  -     glipizide (GLUCOTROL) 5 MG tablet; Take one tablet by mouth twice daily before food  Dispense: 180 tablet; Refill: 1    2. Mixed hyperlipidemia  -     Comprehensive Metabolic Panel  -     Lipid Panel    3. Vitamin D deficiency  -     Vitamin D 25 Hydroxy        Diabetes Mellitus 2 is under good control.  -A1c 7.1, up from 6.3 4/2021  -pt reports hypoglycemia symptoms 2-3 times per week during the day  -continue metformin ER 500mg 2 pills 2x/day.  -continue Jardiance 10mg daily.   -continue levemir 45u qHS  -Continue Januvia 100mg daily  -decrease glipizide to 5mg 2x/day AC  -discussed watching carb intake  -eye exam was done 3/2021, foot exam updated 1/2021, labs today    1.  Diet: 3-4 carb servings per meal for females,  4-5 carb servings per meal for males  Spread carb intake throughout the day  Increase lean protein and vegetable intake  Avoid sugary drinks and processed carbs including crackers, cookies, cakes  2.  Exercise: Recommend at least 30 minutes of exercise daily, at least 5 days per week. Increase exercise gradually.   3.  Blood Glucose Goal: Blood glucose goal <150 fasting, <180 2 hr postprandial  4.  Microalbumin due 7/2021  5.  Education performed during this visit: long term diabetic complications discussed. , annual eye examinations at Ophthalmology discussed, dental hygiene discussed  and foot care reviewed., home glucose monitoring emphasized, all medications, side effects and compliance discussed carefully and Hypoglycemia management and prevention reviewed. Reviewed ‘ABCs’ of diabetes management (respective goals in parentheses):  A1C (<7), blood pressure (<130/80), and cholesterol (LDL <100, if CVD <70).    Hyperlipidemia  -repeat lipid panel  -continue lipitor 20 mg qhs    Vit d deficiency  -repeat level today    There are no Patient Instructions on file for this visit.    Follow up: Return in about 3 months (around 2/8/2022).    Discussed the nature of the disease including, risks, complications, implications, management, safe and proper use of medications. Encouraged therapeutic lifestyle changes including low calorie diet with plenty of fruits and vegetables, daily exercise, medication compliance, and keeping scheduled follow up appointments with me and any other providers. Encouraged patient to have appointment for complete physical, fasting labs, appropriate screenings, and immunizations on an annual basis.        Rosario Claudio PA-C

## 2021-11-09 LAB
25(OH)D3+25(OH)D2 SERPL-MCNC: 28.7 NG/ML (ref 30–100)
ALBUMIN SERPL-MCNC: 4.4 G/DL (ref 3.5–5.2)
ALBUMIN/CREAT UR: 8 MG/G CREAT (ref 0–29)
ALBUMIN/GLOB SERPL: 2.1 G/DL
ALP SERPL-CCNC: 78 U/L (ref 39–117)
ALT SERPL-CCNC: 23 U/L (ref 1–33)
AST SERPL-CCNC: 23 U/L (ref 1–32)
BILIRUB SERPL-MCNC: 0.4 MG/DL (ref 0–1.2)
BUN SERPL-MCNC: 18 MG/DL (ref 8–23)
BUN/CREAT SERPL: 25.4 (ref 7–25)
CALCIUM SERPL-MCNC: 9.4 MG/DL (ref 8.6–10.5)
CHLORIDE SERPL-SCNC: 103 MMOL/L (ref 98–107)
CHOLEST SERPL-MCNC: 105 MG/DL (ref 0–200)
CO2 SERPL-SCNC: 28.8 MMOL/L (ref 22–29)
CREAT SERPL-MCNC: 0.71 MG/DL (ref 0.57–1)
CREAT UR-MCNC: 57.6 MG/DL
GLOBULIN SER CALC-MCNC: 2.1 GM/DL
GLUCOSE SERPL-MCNC: 139 MG/DL (ref 65–99)
HDLC SERPL-MCNC: 37 MG/DL (ref 40–60)
LDLC SERPL CALC-MCNC: 43 MG/DL (ref 0–100)
MICROALBUMIN UR-MCNC: 4.5 UG/ML
POTASSIUM SERPL-SCNC: 4.4 MMOL/L (ref 3.5–5.2)
PROT SERPL-MCNC: 6.5 G/DL (ref 6–8.5)
SODIUM SERPL-SCNC: 142 MMOL/L (ref 136–145)
T4 FREE SERPL-MCNC: 1.04 NG/DL (ref 0.93–1.7)
TRIGL SERPL-MCNC: 148 MG/DL (ref 0–150)
TSH SERPL DL<=0.005 MIU/L-ACNC: 1.06 UIU/ML (ref 0.27–4.2)
VLDLC SERPL CALC-MCNC: 25 MG/DL (ref 5–40)

## 2021-11-10 ENCOUNTER — TELEPHONE (OUTPATIENT)
Dept: ENDOCRINOLOGY | Facility: CLINIC | Age: 66
End: 2021-11-10

## 2021-11-10 DIAGNOSIS — Z79.4 CONTROLLED TYPE 2 DIABETES MELLITUS WITHOUT COMPLICATION, WITH LONG-TERM CURRENT USE OF INSULIN (HCC): Chronic | ICD-10-CM

## 2021-11-10 DIAGNOSIS — E11.9 CONTROLLED TYPE 2 DIABETES MELLITUS WITHOUT COMPLICATION, WITH LONG-TERM CURRENT USE OF INSULIN (HCC): Chronic | ICD-10-CM

## 2021-12-01 DIAGNOSIS — E78.5 HYPERLIPIDEMIA, UNSPECIFIED HYPERLIPIDEMIA TYPE: ICD-10-CM

## 2021-12-01 DIAGNOSIS — E11.9 TYPE 2 DIABETES MELLITUS WITHOUT COMPLICATION, WITHOUT LONG-TERM CURRENT USE OF INSULIN (HCC): ICD-10-CM

## 2021-12-01 RX ORDER — ATORVASTATIN CALCIUM 20 MG/1
20 TABLET, FILM COATED ORAL NIGHTLY
Qty: 90 TABLET | Refills: 1 | Status: SHIPPED | OUTPATIENT
Start: 2021-12-01 | End: 2022-05-31 | Stop reason: SDUPTHER

## 2021-12-01 RX ORDER — ATENOLOL 100 MG/1
100 TABLET ORAL DAILY
Qty: 90 TABLET | Refills: 1 | Status: SHIPPED | OUTPATIENT
Start: 2021-12-01 | End: 2022-05-31 | Stop reason: SDUPTHER

## 2022-01-04 DIAGNOSIS — E11.9 CONTROLLED TYPE 2 DIABETES MELLITUS WITHOUT COMPLICATION, WITH LONG-TERM CURRENT USE OF INSULIN: Chronic | ICD-10-CM

## 2022-01-04 DIAGNOSIS — Z79.4 CONTROLLED TYPE 2 DIABETES MELLITUS WITHOUT COMPLICATION, WITH LONG-TERM CURRENT USE OF INSULIN: Chronic | ICD-10-CM

## 2022-01-04 RX ORDER — INSULIN DETEMIR 100 [IU]/ML
45 INJECTION, SOLUTION SUBCUTANEOUS NIGHTLY
Qty: 45 ML | Refills: 1 | Status: SHIPPED | OUTPATIENT
Start: 2022-01-04 | End: 2022-03-04 | Stop reason: SDUPTHER

## 2022-01-05 RX ORDER — HYDROCHLOROTHIAZIDE 25 MG/1
25 TABLET ORAL DAILY
Qty: 90 TABLET | Refills: 1 | Status: SHIPPED | OUTPATIENT
Start: 2022-01-05 | End: 2022-06-30 | Stop reason: SDUPTHER

## 2022-01-05 RX ORDER — LISINOPRIL 40 MG/1
40 TABLET ORAL DAILY
Qty: 90 TABLET | Refills: 1 | Status: SHIPPED | OUTPATIENT
Start: 2022-01-05 | End: 2022-06-30 | Stop reason: SDUPTHER

## 2022-01-05 RX ORDER — AMLODIPINE BESYLATE 5 MG/1
5 TABLET ORAL DAILY
Qty: 90 TABLET | Refills: 1 | Status: SHIPPED | OUTPATIENT
Start: 2022-01-05 | End: 2022-06-30 | Stop reason: SDUPTHER

## 2022-01-05 NOTE — TELEPHONE ENCOUNTER
Caller: Jennifer Zeinab ZUNILDA    Relationship: Self    Best call back number: [phone]     Requested Prescriptions:   Requested Prescriptions     Pending Prescriptions Disp Refills   • amLODIPine (NORVASC) 5 MG tablet 90 tablet 1     Sig: Take 1 tablet by mouth Daily.   • lisinopril (PRINIVIL,ZESTRIL) 40 MG tablet 90 tablet 1     Sig: Take 1 tablet by mouth Daily.   • hydroCHLOROthiazide (HYDRODIURIL) 25 MG tablet 90 tablet 1     Sig: Take 1 tablet by mouth Daily.        Pharmacy where request should be sent: [address] [phone] [facility] [phone]      Additional details provided by patient: OUT OF  MEDICATION AND REQUESTING A 90 DAY SUPPLY WITH REFILLS PLEASE     Does the patient have less than a 3 day supply:  [x] Yes  [] No    Veronica Cueto Rep   01/05/22 12:59 EST

## 2022-01-12 ENCOUNTER — FLU SHOT (OUTPATIENT)
Dept: INTERNAL MEDICINE | Facility: CLINIC | Age: 67
End: 2022-01-12

## 2022-01-12 DIAGNOSIS — Z23 NEED FOR INFLUENZA VACCINATION: Primary | ICD-10-CM

## 2022-01-12 PROCEDURE — 90662 IIV NO PRSV INCREASED AG IM: CPT | Performed by: INTERNAL MEDICINE

## 2022-01-12 PROCEDURE — G0008 ADMIN INFLUENZA VIRUS VAC: HCPCS | Performed by: INTERNAL MEDICINE

## 2022-01-26 DIAGNOSIS — E11.9 CONTROLLED TYPE 2 DIABETES MELLITUS WITHOUT COMPLICATION, WITH LONG-TERM CURRENT USE OF INSULIN: Chronic | ICD-10-CM

## 2022-01-26 DIAGNOSIS — Z79.4 CONTROLLED TYPE 2 DIABETES MELLITUS WITHOUT COMPLICATION, WITH LONG-TERM CURRENT USE OF INSULIN: Chronic | ICD-10-CM

## 2022-01-26 NOTE — TELEPHONE ENCOUNTER
PT CALLED REQUESTING JANUVIA (90 DAY SUPPLY) TO BE SENT IN TO [facility] IN [place], [place]. PLEASE AND THANK YOU

## 2022-03-04 ENCOUNTER — OFFICE VISIT (OUTPATIENT)
Dept: ENDOCRINOLOGY | Facility: CLINIC | Age: 67
End: 2022-03-04

## 2022-03-04 VITALS
HEART RATE: 64 BPM | OXYGEN SATURATION: 96 % | DIASTOLIC BLOOD PRESSURE: 64 MMHG | SYSTOLIC BLOOD PRESSURE: 120 MMHG | BODY MASS INDEX: 29.07 KG/M2 | WEIGHT: 154 LBS | HEIGHT: 61 IN

## 2022-03-04 DIAGNOSIS — E11.42 CONTROLLED TYPE 2 DIABETES MELLITUS WITH DIABETIC POLYNEUROPATHY, WITH LONG-TERM CURRENT USE OF INSULIN: Primary | Chronic | ICD-10-CM

## 2022-03-04 DIAGNOSIS — Z79.4 CONTROLLED TYPE 2 DIABETES MELLITUS WITH DIABETIC POLYNEUROPATHY, WITH LONG-TERM CURRENT USE OF INSULIN: Primary | Chronic | ICD-10-CM

## 2022-03-04 DIAGNOSIS — E78.2 MIXED HYPERLIPIDEMIA: Chronic | ICD-10-CM

## 2022-03-04 LAB
EXPIRATION DATE: NORMAL
GLUCOSE BLDC GLUCOMTR-MCNC: 226 MG/DL (ref 70–130)
HBA1C MFR BLD: 6.5 %
Lab: NORMAL

## 2022-03-04 PROCEDURE — 3044F HG A1C LEVEL LT 7.0%: CPT | Performed by: PHYSICIAN ASSISTANT

## 2022-03-04 PROCEDURE — 83036 HEMOGLOBIN GLYCOSYLATED A1C: CPT | Performed by: PHYSICIAN ASSISTANT

## 2022-03-04 PROCEDURE — 82947 ASSAY GLUCOSE BLOOD QUANT: CPT | Performed by: PHYSICIAN ASSISTANT

## 2022-03-04 PROCEDURE — 99214 OFFICE O/P EST MOD 30 MIN: CPT | Performed by: PHYSICIAN ASSISTANT

## 2022-03-04 RX ORDER — INSULIN DETEMIR 100 [IU]/ML
45 INJECTION, SOLUTION SUBCUTANEOUS NIGHTLY
Qty: 45 ML | Refills: 1 | Status: SHIPPED | OUTPATIENT
Start: 2022-03-04 | End: 2022-10-17 | Stop reason: SDUPTHER

## 2022-03-16 DIAGNOSIS — F32.89 OTHER DEPRESSION: ICD-10-CM

## 2022-03-16 DIAGNOSIS — F41.9 ANXIETY: ICD-10-CM

## 2022-03-16 RX ORDER — PAROXETINE HYDROCHLORIDE 20 MG/1
20 TABLET, FILM COATED ORAL EVERY MORNING
Qty: 90 TABLET | Refills: 1 | Status: SHIPPED | OUTPATIENT
Start: 2022-03-16 | End: 2022-10-04 | Stop reason: SDUPTHER

## 2022-04-13 DIAGNOSIS — E11.65 UNCONTROLLED TYPE 2 DIABETES MELLITUS WITH HYPERGLYCEMIA: ICD-10-CM

## 2022-04-25 DIAGNOSIS — E11.65 UNCONTROLLED TYPE 2 DIABETES MELLITUS WITH HYPERGLYCEMIA: ICD-10-CM

## 2022-04-25 RX ORDER — METFORMIN HYDROCHLORIDE 500 MG/1
1000 TABLET, EXTENDED RELEASE ORAL 2 TIMES DAILY
Qty: 360 TABLET | Refills: 1 | Status: SHIPPED | OUTPATIENT
Start: 2022-04-25 | End: 2022-11-01 | Stop reason: SDUPTHER

## 2022-05-31 DIAGNOSIS — E11.9 TYPE 2 DIABETES MELLITUS WITHOUT COMPLICATION, WITHOUT LONG-TERM CURRENT USE OF INSULIN: ICD-10-CM

## 2022-05-31 DIAGNOSIS — E78.5 HYPERLIPIDEMIA, UNSPECIFIED HYPERLIPIDEMIA TYPE: ICD-10-CM

## 2022-05-31 RX ORDER — ATORVASTATIN CALCIUM 20 MG/1
20 TABLET, FILM COATED ORAL NIGHTLY
Qty: 90 TABLET | Refills: 1 | Status: SHIPPED | OUTPATIENT
Start: 2022-05-31 | End: 2022-12-07 | Stop reason: SDUPTHER

## 2022-05-31 RX ORDER — ATENOLOL 100 MG/1
100 TABLET ORAL DAILY
Qty: 90 TABLET | Refills: 1 | Status: SHIPPED | OUTPATIENT
Start: 2022-05-31 | End: 2022-11-21 | Stop reason: SDUPTHER

## 2022-06-08 ENCOUNTER — PATIENT OUTREACH (OUTPATIENT)
Dept: PHARMACY | Facility: HOSPITAL | Age: 67
End: 2022-06-08

## 2022-06-08 NOTE — OUTREACH NOTE
Medication Adherence Call    Patient was called today to discuss medication adherence, as she was identified as having care opportunities.    she denies issues with adherence and denies any barriers to receiving medications.    Virginia Andrews, PharmD  Population Health Pharmacist  06/08/22

## 2022-06-30 DIAGNOSIS — Z79.4 CONTROLLED TYPE 2 DIABETES MELLITUS WITHOUT COMPLICATION, WITH LONG-TERM CURRENT USE OF INSULIN: Chronic | ICD-10-CM

## 2022-06-30 DIAGNOSIS — E11.9 CONTROLLED TYPE 2 DIABETES MELLITUS WITHOUT COMPLICATION, WITH LONG-TERM CURRENT USE OF INSULIN: Chronic | ICD-10-CM

## 2022-06-30 RX ORDER — AMLODIPINE BESYLATE 5 MG/1
5 TABLET ORAL DAILY
Qty: 90 TABLET | Refills: 1 | Status: SHIPPED | OUTPATIENT
Start: 2022-06-30 | End: 2023-01-03 | Stop reason: SDUPTHER

## 2022-06-30 RX ORDER — HYDROCHLOROTHIAZIDE 25 MG/1
25 TABLET ORAL DAILY
Qty: 90 TABLET | Refills: 1 | Status: SHIPPED | OUTPATIENT
Start: 2022-06-30 | End: 2023-01-03 | Stop reason: SDUPTHER

## 2022-06-30 RX ORDER — LISINOPRIL 40 MG/1
40 TABLET ORAL DAILY
Qty: 90 TABLET | Refills: 1 | Status: SHIPPED | OUTPATIENT
Start: 2022-06-30 | End: 2023-01-03 | Stop reason: SDUPTHER

## 2022-07-11 ENCOUNTER — LAB (OUTPATIENT)
Dept: LAB | Facility: HOSPITAL | Age: 67
End: 2022-07-11

## 2022-07-11 ENCOUNTER — OFFICE VISIT (OUTPATIENT)
Dept: ENDOCRINOLOGY | Facility: CLINIC | Age: 67
End: 2022-07-11

## 2022-07-11 VITALS
BODY MASS INDEX: 28.58 KG/M2 | SYSTOLIC BLOOD PRESSURE: 118 MMHG | OXYGEN SATURATION: 95 % | DIASTOLIC BLOOD PRESSURE: 70 MMHG | HEIGHT: 61 IN | HEART RATE: 64 BPM | WEIGHT: 151.4 LBS

## 2022-07-11 DIAGNOSIS — E11.40 CONTROLLED TYPE 2 DIABETES WITH NEUROPATHY: Primary | Chronic | ICD-10-CM

## 2022-07-11 DIAGNOSIS — E78.2 MIXED HYPERLIPIDEMIA: Chronic | ICD-10-CM

## 2022-07-11 LAB
EXPIRATION DATE: ABNORMAL
EXPIRATION DATE: NORMAL
GLUCOSE BLDC GLUCOMTR-MCNC: 162 MG/DL (ref 70–130)
HBA1C MFR BLD: 6.7 %
Lab: ABNORMAL
Lab: NORMAL

## 2022-07-11 PROCEDURE — 3044F HG A1C LEVEL LT 7.0%: CPT | Performed by: PHYSICIAN ASSISTANT

## 2022-07-11 PROCEDURE — 99214 OFFICE O/P EST MOD 30 MIN: CPT | Performed by: PHYSICIAN ASSISTANT

## 2022-07-11 PROCEDURE — 83036 HEMOGLOBIN GLYCOSYLATED A1C: CPT | Performed by: PHYSICIAN ASSISTANT

## 2022-07-11 PROCEDURE — 82947 ASSAY GLUCOSE BLOOD QUANT: CPT | Performed by: PHYSICIAN ASSISTANT

## 2022-07-11 RX ORDER — LANCETS 30 GAUGE
EACH MISCELLANEOUS
Qty: 100 EACH | Refills: 3 | Status: SHIPPED | OUTPATIENT
Start: 2022-07-11

## 2022-07-11 NOTE — PROGRESS NOTES
Chief Complaint  F/u for Diabetes Mellitus.    HPI   Zeinab Rodriguez is a 66 y.o. female who is here today for f/u of Diabetes Mellitus type 2. The initial diagnosis of diabetes was made approximately 2009.       Diabetic complications: peripheral neuropathy- tingling  Eye exam current (within one year): utd 12/2021 Dr [facility]bar at [facility]    Current diabetic medications include:  Metformin ER 500mg 2 tab BID - tolerating better than immediate release  Glipizide 5mg BID  Januvia 100mg QD   Jardiance 10mg daily  Levemir 45U QHS    Statin: lipitor 20    Past medications: none    Diabetic Monitoring  -   No meter or log for review. Pt denies hypoglycemia.      The following portions of the patient's history were reviewed and updated by me as appropriate: allergies, current medications, past family history, past social history, past surgical history and problem list.      Past Medical History:   Diagnosis Date   • Anxiety    • Breast cancer (HCC)    • Depression    • Diabetes mellitus (HCC)    • Glaucoma    • Hypertension    • Myopic degeneration        Medications    Current Outpatient Medications:   •  glucose blood (True Metrix Blood Glucose Test) test strip, Use to test blood glucose once daily and as needed, Disp: 100 each, Rfl: 3  •  Insulin Pen Needle 32G X 4 MM misc, Use daily with insulin, Disp: 100 each, Rfl: 3  •  Lancets misc, USE TO TEST ONCE DAILY AND AS NEEDED, Disp: 100 each, Rfl: 3  •  SITagliptin (Januvia) 100 MG tablet, Take 1 tablet by mouth Daily., Disp: 90 tablet, Rfl: 1  •  amLODIPine (NORVASC) 5 MG tablet, Take 1 tablet by mouth Daily., Disp: 90 tablet, Rfl: 1  •  aspirin 81 MG tablet, Take  by mouth daily., Disp: , Rfl:   •  atenolol (TENORMIN) 100 MG tablet, Take 1 tablet by mouth Daily., Disp: 90 tablet, Rfl: 1  •  atorvastatin (LIPITOR) 20 MG tablet, Take 1 tablet by mouth Every Night., Disp: 90 tablet, Rfl: 1  •  CILOXAN 0.3 % ophthalmic ointment, , Disp: , Rfl:   •   dorzolamide-timolol (COSOPT) 22.3-6.8 MG/ML ophthalmic solution, Apply  to eye 2 (two) times a day., Disp: , Rfl:   •  empagliflozin (Jardiance) 10 MG tablet tablet, Take 1 tablet by mouth Daily., Disp: 90 tablet, Rfl: 1  •  glipizide (GLUCOTROL) 5 MG tablet, Take one tablet by mouth twice daily before food, Disp: 180 tablet, Rfl: 1  •  hydroCHLOROthiazide (HYDRODIURIL) 25 MG tablet, Take 1 tablet by mouth Daily., Disp: 90 tablet, Rfl: 1  •  insulin detemir (Levemir FlexTouch) 100 UNIT/ML injection, Inject 45 Units under the skin into the appropriate area as directed Every Night., Disp: 45 mL, Rfl: 1  •  latanoprost (XALATAN) 0.005 % ophthalmic solution, , Disp: , Rfl:   •  lisinopril (PRINIVIL,ZESTRIL) 40 MG tablet, Take 1 tablet by mouth Daily., Disp: 90 tablet, Rfl: 1  •  metFORMIN ER (GLUCOPHAGE-XR) 500 MG 24 hr tablet, Take 2 tablets by mouth 2 (Two) Times a Day., Disp: 360 tablet, Rfl: 1  •  metroNIDAZOLE (METROGEL) 1 % gel, Apply  topically Daily., Disp: 60 g, Rfl: 3  •  Multiple Vitamins-Minerals (ICAPS AREDS FORMULA PO), Take  by mouth 2 (two) times a day., Disp: , Rfl:   •  PARoxetine (PAXIL) 20 MG tablet, Take 1 tablet by mouth Every Morning., Disp: 90 tablet, Rfl: 1  •  prednisoLONE acetate (PRED FORTE) 1 % ophthalmic suspension, , Disp: , Rfl:     Review of Systems  Review of Systems   Constitutional: Positive for fatigue. Negative for chills, fever and unexpected weight change.        Feeling poorly   HENT: Negative for ear pain, hearing loss, nosebleeds, rhinorrhea and sore throat.    Eyes: Negative for pain, discharge, redness and itching.   Respiratory: Negative for cough, shortness of breath and wheezing.    Cardiovascular: Negative for chest pain, palpitations and leg swelling.   Gastrointestinal: Negative for abdominal pain, blood in stool and constipation.   Endocrine: Negative for cold intolerance, heat intolerance and polydipsia.   Genitourinary: Negative for dysuria, frequency, hematuria,  "pelvic pain, vaginal bleeding and vaginal discharge.   Musculoskeletal: Positive for arthralgias. Negative for gait problem, joint swelling and myalgias.   Skin: Negative for rash.   Allergic/Immunologic: Negative.    Neurological: Negative for dizziness, syncope, weakness, numbness and headaches.   Hematological: Negative for adenopathy. Does not bruise/bleed easily.   Psychiatric/Behavioral: Negative for sleep disturbance and suicidal ideas. The patient is not nervous/anxious.         Objective   /70   Pulse 64   Ht 154.9 cm (61\")   Wt 68.7 kg (151 lb 6.4 oz)   LMP  (LMP Unknown) Comment: Last pap 1/2020 by JEREMIE Watson  SpO2 95%   BMI 28.61 kg/m² Body mass index is 28.61 kg/m².    Physical Exam  Constitutional:       General: She is not in acute distress.     Appearance: She is well-developed. She is not diaphoretic.   HENT:      Head: Normocephalic.      Right Ear: External ear normal.      Left Ear: External ear normal.   Eyes:      General: Lids are normal.         Right eye: No discharge.         Left eye: No discharge.      Conjunctiva/sclera: Conjunctivae normal.   Neck:      Thyroid: No thyroid mass or thyromegaly.      Vascular: No JVD.      Trachea: No tracheal deviation.   Cardiovascular:      Rate and Rhythm: Normal rate and regular rhythm.      Heart sounds: Normal heart sounds. No murmur heard.  Pulmonary:      Effort: Pulmonary effort is normal. No respiratory distress.      Breath sounds: Normal breath sounds. No wheezing.   Musculoskeletal:         General: No tenderness.   Lymphadenopathy:      Cervical: No cervical adenopathy.   Skin:     General: Skin is warm and dry.      Findings: No erythema or rash.   Neurological:      Mental Status: She is alert and oriented to person, place, and time.   Psychiatric:         Speech: Speech normal.         Behavior: Behavior normal.         Thought Content: Thought content normal.             Labs and Imaging   Lab Results   Component Value " Date    HGBA1C 6.7 07/11/2022    HGBA1C 6.5 03/04/2022    HGBA1C 7.1 11/08/2021     Office Visit on 07/11/2022   Component Date Value Ref Range Status   • Hemoglobin A1C 07/11/2022 6.7  % Final   • Lot Number 07/11/2022 [id number]   Final   • Expiration Date 07/11/2022 03/01/2024   Final   • Glucose 07/11/2022 162 (A) 70 - 130 mg/dL Final   • Lot Number 07/11/2022 [id number]   Final   • Expiration Date 07/11/2022 01/28/2023   Final     No images are attached to the encounter or orders placed in the encounter.  No Images in the past 120 days found..    Assessment / Plan   Diagnoses and all orders for this visit:    1. Controlled type 2 diabetes with neuropathy (HCC) (Primary)  -     POC Glycosylated Hemoglobin (Hb A1C)  -     POC Glucose, Blood  -     glucose blood (True Metrix Blood Glucose Test) test strip; Use to test blood glucose once daily and as needed  Dispense: 100 each; Refill: 3  -     Lancets misc; USE TO TEST ONCE DAILY AND AS NEEDED  Dispense: 100 each; Refill: 3  -     Insulin Pen Needle 32G X 4 MM misc; Use daily with insulin  Dispense: 100 each; Refill: 3  -     SITagliptin (Januvia) 100 MG tablet; Take 1 tablet by mouth Daily.  Dispense: 90 tablet; Refill: 1  -     Comprehensive Metabolic Panel  -     Lipid Panel  -     TSH    2. Mixed hyperlipidemia  -     Comprehensive Metabolic Panel  -     Lipid Panel        Diabetes Mellitus 2 is under good control.  -A1c 6.5, down from 7.1 last visit  -significantly less hypoglycemia compared to last visit  -continue metformin ER 500mg 2 pills 2x/day.  -continue Jardiance 10mg daily.   -continue levemir 45u qHS  -Continue Januvia 100mg daily  -continue glipizide 5mg 2x/day AC  -eye exam was done 5/2021, foot exam updated today, labs updated 11/2021    1.  Diet: 3-4 carb servings per meal for females, 4-5 carb servings per meal for males  Spread carb intake throughout the day  Increase lean protein and vegetable intake  Avoid sugary drinks and processed carbs  including crackers, cookies, cakes  2.  Exercise: Recommend at least 30 minutes of exercise daily, at least 5 days per week. Increase exercise gradually.   3.  Blood Glucose Goal: Blood glucose goal <150 fasting, <180 2 hr postprandial  4.  Microalbumin due 11/2022  5.  Education performed during this visit: long term diabetic complications discussed. , annual eye examinations at Ophthalmology discussed, dental hygiene discussed  and foot care reviewed., home glucose monitoring emphasized, all medications, side effects and compliance discussed carefully and Hypoglycemia management and prevention reviewed. Reviewed ‘ABCs’ of diabetes management (respective goals in parentheses):  A1C (<7), blood pressure (<130/80), and cholesterol (LDL <100, if CVD <70).    Hyperlipidemia  -lipids updated 11/2021  -continue lipitor 20 mg qhs        There are no Patient Instructions on file for this visit.    Follow up: Return in about 4 months (around 11/11/2022).    Discussed the nature of the disease including, risks, complications, implications, management, safe and proper use of medications. Encouraged therapeutic lifestyle changes including low calorie diet with plenty of fruits and vegetables, daily exercise, medication compliance, and keeping scheduled follow up appointments with me and any other providers. Encouraged patient to have appointment for complete physical, fasting labs, appropriate screenings, and immunizations on an annual basis.        Rosario Claudio PA-C      Chief Complaint  F/u for Diabetes Mellitus.    CORINNE Rodriguez is a 66 y.o. female who is here today for f/u of Diabetes Mellitus type 2. The initial diagnosis of diabetes was made approximately 2009.      during visit. Had cereal for breakfast.   Is taking OTC vitamin D.     A1C- 6.5 (3/4/2022), 7.1 (11/8/21), 6.3 (4/19/2021), 6.1 (1/18/2021), 6.3 (9/16/2020), 6.4 (6/15/2020), 7.6 (3/11/2020), 6.6 (12/11/19), 9.9 (9/24/19), 9.5  (6/6/19)    Diabetic complications: peripheral neuropathy- tingling  Eye exam current (within one year): utd 10/2021 Dr Akbar at [facility], no retinopathy  Foot care and dental care: discussed    Current diabetic medications include:  Metformin ER 500mg 2 tab BID - tolerating better than immediate release  Glipizide 5mg BID  Januvia 100mg QD   Jardiance 10mg daily  Levemir 45U QHS    Statin: lipitor 20    Past medications: none    Diabetic Monitoring  -   No meter or log for review. Pt reports BG readings 130-150 mostly, did have one episode of near hypoglycemia (76) which she was symptomatic with - forgot to eat     The following portions of the patient's history were reviewed and updated by me as appropriate: allergies, current medications, past family history, past social history, past surgical history and problem list.      Past Medical History:   Diagnosis Date   • Anxiety    • Breast cancer (HCC)    • Depression    • Diabetes mellitus (HCC)    • Glaucoma    • Hypertension    • Myopic degeneration        Medications    Current Outpatient Medications:   •  glucose blood (True Metrix Blood Glucose Test) test strip, Use to test blood glucose once daily and as needed, Disp: 100 each, Rfl: 3  •  Insulin Pen Needle 32G X 4 MM misc, Use daily with insulin, Disp: 100 each, Rfl: 3  •  Lancets misc, USE TO TEST ONCE DAILY AND AS NEEDED, Disp: 100 each, Rfl: 3  •  SITagliptin (Januvia) 100 MG tablet, Take 1 tablet by mouth Daily., Disp: 90 tablet, Rfl: 1  •  amLODIPine (NORVASC) 5 MG tablet, Take 1 tablet by mouth Daily., Disp: 90 tablet, Rfl: 1  •  aspirin 81 MG tablet, Take  by mouth daily., Disp: , Rfl:   •  atenolol (TENORMIN) 100 MG tablet, Take 1 tablet by mouth Daily., Disp: 90 tablet, Rfl: 1  •  atorvastatin (LIPITOR) 20 MG tablet, Take 1 tablet by mouth Every Night., Disp: 90 tablet, Rfl: 1  •  CILOXAN 0.3 % ophthalmic ointment, , Disp: , Rfl:   •  dorzolamide-timolol (COSOPT) 22.3-6.8 MG/ML  "ophthalmic solution, Apply  to eye 2 (two) times a day., Disp: , Rfl:   •  empagliflozin (Jardiance) 10 MG tablet tablet, Take 1 tablet by mouth Daily., Disp: 90 tablet, Rfl: 1  •  glipizide (GLUCOTROL) 5 MG tablet, Take one tablet by mouth twice daily before food, Disp: 180 tablet, Rfl: 1  •  hydroCHLOROthiazide (HYDRODIURIL) 25 MG tablet, Take 1 tablet by mouth Daily., Disp: 90 tablet, Rfl: 1  •  insulin detemir (Levemir FlexTouch) 100 UNIT/ML injection, Inject 45 Units under the skin into the appropriate area as directed Every Night., Disp: 45 mL, Rfl: 1  •  latanoprost (XALATAN) 0.005 % ophthalmic solution, , Disp: , Rfl:   •  lisinopril (PRINIVIL,ZESTRIL) 40 MG tablet, Take 1 tablet by mouth Daily., Disp: 90 tablet, Rfl: 1  •  metFORMIN ER (GLUCOPHAGE-XR) 500 MG 24 hr tablet, Take 2 tablets by mouth 2 (Two) Times a Day., Disp: 360 tablet, Rfl: 1  •  metroNIDAZOLE (METROGEL) 1 % gel, Apply  topically Daily., Disp: 60 g, Rfl: 3  •  Multiple Vitamins-Minerals (ICAPS AREDS FORMULA PO), Take  by mouth 2 (two) times a day., Disp: , Rfl:   •  PARoxetine (PAXIL) 20 MG tablet, Take 1 tablet by mouth Every Morning., Disp: 90 tablet, Rfl: 1  •  prednisoLONE acetate (PRED FORTE) 1 % ophthalmic suspension, , Disp: , Rfl:     Review of Systems  Review of Systems   All other systems reviewed and are negative.       Physical Exam    /70   Pulse 64   Ht 154.9 cm (61\")   Wt 68.7 kg (151 lb 6.4 oz)   LMP  (LMP Unknown) Comment: Last pap 1/2020 by JEREMIE Watson  SpO2 95%   BMI 28.61 kg/m² Body mass index is 28.61 kg/m².  Physical Exam   Constitutional: She is oriented to person, place, and time. She appears well-developed. No distress.   HENT:   Head: Normocephalic.   Right Ear: External ear normal.   Left Ear: External ear normal.   Eyes: Conjunctivae and lids are normal. Right eye exhibits no discharge. Left eye exhibits no discharge.   Neck: No JVD present. No tracheal deviation present. No thyroid mass and no " thyromegaly present.   Cardiovascular: Normal rate, regular rhythm and normal heart sounds.   No murmur heard.  Pulmonary/Chest: Effort normal and breath sounds normal. No respiratory distress. She has no wheezes.   Musculoskeletal: No tenderness.   Lymphadenopathy:     She has no cervical adenopathy.   Neurological: She is alert and oriented to person, place, and time.   Skin: Skin is warm and dry. No rash noted. She is not diaphoretic. No erythema.   Psychiatric: Her speech is normal and behavior is normal. Thought content normal.       Labs and Imaging   Lab Results   Component Value Date    HGBA1C 6.7 07/11/2022    HGBA1C 6.5 03/04/2022    HGBA1C 7.1 11/08/2021     Office Visit on 07/11/2022   Component Date Value Ref Range Status   • Hemoglobin A1C 07/11/2022 6.7  % Final   • Lot Number 07/11/2022 [id number]   Final   • Expiration Date 07/11/2022 03/01/2024   Final   • Glucose 07/11/2022 162 (A) 70 - 130 mg/dL Final   • Lot Number 07/11/2022 [id number]   Final   • Expiration Date 07/11/2022 01/28/2023   Final     No images are attached to the encounter or orders placed in the encounter.  No Images in the past 120 days found..    Assessment / Plan   Diagnoses and all orders for this visit:    1. Controlled type 2 diabetes with neuropathy (HCC) (Primary)  -     POC Glycosylated Hemoglobin (Hb A1C)  -     POC Glucose, Blood  -     glucose blood (True Metrix Blood Glucose Test) test strip; Use to test blood glucose once daily and as needed  Dispense: 100 each; Refill: 3  -     Lancets misc; USE TO TEST ONCE DAILY AND AS NEEDED  Dispense: 100 each; Refill: 3  -     Insulin Pen Needle 32G X 4 MM misc; Use daily with insulin  Dispense: 100 each; Refill: 3  -     SITagliptin (Januvia) 100 MG tablet; Take 1 tablet by mouth Daily.  Dispense: 90 tablet; Refill: 1  -     Comprehensive Metabolic Panel  -     Lipid Panel  -     TSH    2. Mixed hyperlipidemia  -     Comprehensive Metabolic Panel  -     Lipid  Panel        Diabetes Mellitus 2 is under good control.  -A1c 6.7  -pt denies hypoglycemia  -continue metformin ER 500mg 2 pills 2x/day.  -continue Jardiance 10mg daily.   -continue levemir 45u qHS  -Continue Januvia 100mg daily - discussed changing to GLP-1 agonist for CV benefits but pt would like to think about it  -continue glipizide 5mg 2x/day AC  -eye exam was done 12/2021, foot exam updated 3/2022, labs today, ur alb due 11/2022    Hyperlipidemia  -lipids today  -continue lipitor 20 mg qhs        There are no Patient Instructions on file for this visit.    Follow up: Return in about 4 months (around 11/11/2022).      Rosario Claudio PA-C

## 2022-07-12 LAB
ALBUMIN SERPL-MCNC: 4.4 G/DL (ref 3.8–4.8)
ALBUMIN/GLOB SERPL: 2 {RATIO} (ref 1.2–2.2)
ALP SERPL-CCNC: 80 IU/L (ref 44–121)
ALT SERPL-CCNC: 22 IU/L (ref 0–32)
AST SERPL-CCNC: 23 IU/L (ref 0–40)
BILIRUB SERPL-MCNC: 0.4 MG/DL (ref 0–1.2)
BUN SERPL-MCNC: 15 MG/DL (ref 8–27)
BUN/CREAT SERPL: 19 (ref 12–28)
CALCIUM SERPL-MCNC: 9.7 MG/DL (ref 8.7–10.3)
CHLORIDE SERPL-SCNC: 99 MMOL/L (ref 96–106)
CHOLEST SERPL-MCNC: 121 MG/DL (ref 100–199)
CO2 SERPL-SCNC: 27 MMOL/L (ref 20–29)
CREAT SERPL-MCNC: 0.78 MG/DL (ref 0.57–1)
EGFRCR SERPLBLD CKD-EPI 2021: 83 ML/MIN/1.73
GLOBULIN SER CALC-MCNC: 2.2 G/DL (ref 1.5–4.5)
GLUCOSE SERPL-MCNC: 145 MG/DL (ref 65–99)
HDLC SERPL-MCNC: 38 MG/DL
LDLC SERPL CALC-MCNC: 50 MG/DL (ref 0–99)
POTASSIUM SERPL-SCNC: 4 MMOL/L (ref 3.5–5.2)
PROT SERPL-MCNC: 6.6 G/DL (ref 6–8.5)
SODIUM SERPL-SCNC: 147 MMOL/L (ref 134–144)
TRIGL SERPL-MCNC: 201 MG/DL (ref 0–149)
TSH SERPL DL<=0.005 MIU/L-ACNC: 1.1 UIU/ML (ref 0.45–4.5)
VLDLC SERPL CALC-MCNC: 33 MG/DL (ref 5–40)

## 2022-09-22 DIAGNOSIS — F41.9 ANXIETY: ICD-10-CM

## 2022-09-22 DIAGNOSIS — F32.89 OTHER DEPRESSION: ICD-10-CM

## 2022-09-22 RX ORDER — PAROXETINE HYDROCHLORIDE 20 MG/1
20 TABLET, FILM COATED ORAL EVERY MORNING
Qty: 90 TABLET | Refills: 1 | OUTPATIENT
Start: 2022-09-22

## 2022-09-22 NOTE — TELEPHONE ENCOUNTER
Caller: JenniferZeinab    Relationship: Self    Best call back number: [phone]     Requested Prescriptions:   Requested Prescriptions     Pending Prescriptions Disp Refills   • PARoxetine (PAXIL) 20 MG tablet 90 tablet 1     Sig: Take 1 tablet by mouth Every Morning.        Pharmacy where request should be sent:  [address] - [phone]  - [phone]   [phone]    Additional details provided by patient:    Does the patient have less than a 3 day supply:  [x] Yes  [] No    Veronica German Rep   09/22/22 12:45 EDT

## 2022-09-26 DIAGNOSIS — E11.649 CONTROLLED TYPE 2 DIABETES MELLITUS WITH HYPOGLYCEMIA, WITH LONG-TERM CURRENT USE OF INSULIN: Chronic | ICD-10-CM

## 2022-09-26 DIAGNOSIS — Z79.4 CONTROLLED TYPE 2 DIABETES MELLITUS WITH HYPOGLYCEMIA, WITH LONG-TERM CURRENT USE OF INSULIN: Chronic | ICD-10-CM

## 2022-09-26 RX ORDER — GLIPIZIDE 5 MG/1
TABLET ORAL
Qty: 180 TABLET | Refills: 1 | Status: SHIPPED | OUTPATIENT
Start: 2022-09-26

## 2022-10-03 ENCOUNTER — TELEPHONE (OUTPATIENT)
Dept: INTERNAL MEDICINE | Facility: CLINIC | Age: 67
End: 2022-10-03

## 2022-10-03 DIAGNOSIS — F32.89 OTHER DEPRESSION: ICD-10-CM

## 2022-10-03 DIAGNOSIS — F41.9 ANXIETY: ICD-10-CM

## 2022-10-03 NOTE — TELEPHONE ENCOUNTER
Caller: Zeinab Rodriguez    Relationship: Self    Best call back number: [phone]    What is the best time to reach you: ANYTIME    Who are you requesting to speak with (clinical staff, provider,  specific staff member): CLINICAL STAFF    Do you know the name of the person who called: SELF    What was the call regarding: PATIENT STATES THAT SHE WOULD LIKE A CALL ABOUT THE DENIAL OF HER PARoxetine (PAXIL) 20 MG tablet    Do you require a callback: YES

## 2022-10-04 RX ORDER — PAROXETINE HYDROCHLORIDE 20 MG/1
20 TABLET, FILM COATED ORAL EVERY MORNING
Qty: 90 TABLET | Refills: 1 | Status: SHIPPED | OUTPATIENT
Start: 2022-10-04 | End: 2023-03-23 | Stop reason: SDUPTHER

## 2022-10-04 NOTE — TELEPHONE ENCOUNTER
Spoke with patient, informed that we are not sure why it was denied but that Dr Almazan has sent it in and we have received verification of receipt from pharmacy.  Verbalized appreciation.

## 2022-10-12 ENCOUNTER — FLU SHOT (OUTPATIENT)
Dept: INTERNAL MEDICINE | Facility: CLINIC | Age: 67
End: 2022-10-12

## 2022-10-12 DIAGNOSIS — Z23 NEED FOR INFLUENZA VACCINATION: Primary | ICD-10-CM

## 2022-10-12 PROCEDURE — 90662 IIV NO PRSV INCREASED AG IM: CPT | Performed by: INTERNAL MEDICINE

## 2022-10-12 PROCEDURE — G0008 ADMIN INFLUENZA VIRUS VAC: HCPCS | Performed by: INTERNAL MEDICINE

## 2022-10-17 DIAGNOSIS — Z79.4 CONTROLLED TYPE 2 DIABETES MELLITUS WITH DIABETIC POLYNEUROPATHY, WITH LONG-TERM CURRENT USE OF INSULIN: Chronic | ICD-10-CM

## 2022-10-17 DIAGNOSIS — E11.42 CONTROLLED TYPE 2 DIABETES MELLITUS WITH DIABETIC POLYNEUROPATHY, WITH LONG-TERM CURRENT USE OF INSULIN: Chronic | ICD-10-CM

## 2022-10-17 RX ORDER — INSULIN DETEMIR 100 [IU]/ML
45 INJECTION, SOLUTION SUBCUTANEOUS NIGHTLY
Qty: 45 ML | Refills: 1 | Status: SHIPPED | OUTPATIENT
Start: 2022-10-17 | End: 2023-02-20 | Stop reason: SDUPTHER

## 2022-11-01 DIAGNOSIS — E11.65 UNCONTROLLED TYPE 2 DIABETES MELLITUS WITH HYPERGLYCEMIA: ICD-10-CM

## 2022-11-01 RX ORDER — METFORMIN HYDROCHLORIDE 500 MG/1
1000 TABLET, EXTENDED RELEASE ORAL 2 TIMES DAILY
Qty: 360 TABLET | Refills: 1 | Status: SHIPPED | OUTPATIENT
Start: 2022-11-01

## 2022-11-01 NOTE — TELEPHONE ENCOUNTER
Pt called requesting a prescription for Metformin 500 mg. Pt last f/u 07/11/22 pt next f/u 11/14/22

## 2022-11-21 RX ORDER — ATENOLOL 100 MG/1
100 TABLET ORAL DAILY
Qty: 90 TABLET | Refills: 1 | Status: SHIPPED | OUTPATIENT
Start: 2022-11-21

## 2022-12-07 DIAGNOSIS — E78.5 HYPERLIPIDEMIA, UNSPECIFIED HYPERLIPIDEMIA TYPE: ICD-10-CM

## 2022-12-07 DIAGNOSIS — E11.9 TYPE 2 DIABETES MELLITUS WITHOUT COMPLICATION, WITHOUT LONG-TERM CURRENT USE OF INSULIN: ICD-10-CM

## 2022-12-07 RX ORDER — ATORVASTATIN CALCIUM 20 MG/1
20 TABLET, FILM COATED ORAL NIGHTLY
Qty: 90 TABLET | Refills: 1 | Status: SHIPPED | OUTPATIENT
Start: 2022-12-07

## 2023-01-03 RX ORDER — HYDROCHLOROTHIAZIDE 25 MG/1
25 TABLET ORAL DAILY
Qty: 90 TABLET | Refills: 0 | Status: SHIPPED | OUTPATIENT
Start: 2023-01-03 | End: 2023-04-06 | Stop reason: SDUPTHER

## 2023-01-03 RX ORDER — LISINOPRIL 40 MG/1
40 TABLET ORAL DAILY
Qty: 90 TABLET | Refills: 0 | Status: SHIPPED | OUTPATIENT
Start: 2023-01-03 | End: 2023-01-05 | Stop reason: SDUPTHER

## 2023-01-03 RX ORDER — AMLODIPINE BESYLATE 5 MG/1
5 TABLET ORAL DAILY
Qty: 90 TABLET | Refills: 0 | Status: SHIPPED | OUTPATIENT
Start: 2023-01-03 | End: 2023-04-06 | Stop reason: SDUPTHER

## 2023-01-03 NOTE — TELEPHONE ENCOUNTER
Caller: Zeinab Rodriguez    Relationship: Self    Best call back number: [phone]    Requested Prescriptions:   Requested Prescriptions     Pending Prescriptions Disp Refills   • lisinopril (PRINIVIL,ZESTRIL) 40 MG tablet 90 tablet 1     Sig: Take 1 tablet by mouth Daily.   • amLODIPine (NORVASC) 5 MG tablet 90 tablet 1     Sig: Take 1 tablet by mouth Daily.   • hydroCHLOROthiazide (HYDRODIURIL) 25 MG tablet 90 tablet 1     Sig: Take 1 tablet by mouth Daily.        Pharmacy where request should be sent: [address] [phone] [facility] [phone]      Additional details provided by patient:    Does the patient have less than a 3 day supply:  [x] Yes  [] No    Would you like a call back once the refill request has been completed: [x] Yes [] No    If the office needs to give you a call back, can they leave a voicemail: [] Yes [x] No    Veronica Burgess Rep   01/03/23 14:14 EST

## 2023-01-05 RX ORDER — LISINOPRIL 40 MG/1
40 TABLET ORAL DAILY
Qty: 90 TABLET | Refills: 0 | Status: SHIPPED | OUTPATIENT
Start: 2023-01-05 | End: 2023-04-06 | Stop reason: SDUPTHER

## 2023-01-05 NOTE — TELEPHONE ENCOUNTER
Caller: Zeinab Rodriguez    Relationship to patient: Self    Best call back number: [phone]    Patient is needing: PATIENT STATES THE PHARMACY DOES NOT HAVE THE LISINOPRIL AND ASKING FOR A CALL FROM [facility] ABOUT THIS.

## 2023-01-05 NOTE — TELEPHONE ENCOUNTER
Spoke with patient, states she went to pharmacy to  [facility] and they had the Amlodipine and HCTZ but said they did not receive the Lisinopril.  Explained we sent them all same time and received confirmation of receipt from pharmacy but that we will resend to pharmacy now so that she can get it.  Verbalized great appreciation.  Encouraged to call with further needs.     Lisinopril resent via erx.

## 2023-02-02 DIAGNOSIS — E11.40 CONTROLLED TYPE 2 DIABETES WITH NEUROPATHY: Chronic | ICD-10-CM

## 2023-02-16 ENCOUNTER — OFFICE VISIT (OUTPATIENT)
Dept: INTERNAL MEDICINE | Facility: CLINIC | Age: 68
End: 2023-02-16
Payer: MEDICARE

## 2023-02-16 VITALS
HEART RATE: 65 BPM | DIASTOLIC BLOOD PRESSURE: 70 MMHG | HEIGHT: 61 IN | WEIGHT: 155.6 LBS | TEMPERATURE: 97.1 F | SYSTOLIC BLOOD PRESSURE: 130 MMHG | BODY MASS INDEX: 29.38 KG/M2 | OXYGEN SATURATION: 98 %

## 2023-02-16 DIAGNOSIS — E11.9 TYPE 2 DIABETES MELLITUS WITHOUT COMPLICATION, WITHOUT LONG-TERM CURRENT USE OF INSULIN: Primary | ICD-10-CM

## 2023-02-16 DIAGNOSIS — F32.9 REACTIVE DEPRESSION: ICD-10-CM

## 2023-02-16 DIAGNOSIS — I10 ESSENTIAL HYPERTENSION: ICD-10-CM

## 2023-02-16 DIAGNOSIS — E78.5 HYPERLIPIDEMIA, UNSPECIFIED HYPERLIPIDEMIA TYPE: ICD-10-CM

## 2023-02-16 LAB
ALBUMIN/CREATININE RATIO, URINE: <30
BILIRUB BLD-MCNC: NEGATIVE MG/DL
CLARITY, POC: CLEAR
COLOR UR: YELLOW
EXPIRATION DATE: ABNORMAL
EXPIRATION DATE: NORMAL
GLUCOSE UR STRIP-MCNC: ABNORMAL MG/DL
KETONES UR QL: NEGATIVE
LEUKOCYTE EST, POC: NEGATIVE
Lab: ABNORMAL
Lab: NORMAL
NITRITE UR-MCNC: NEGATIVE MG/ML
PH UR: 5 [PH] (ref 5–8)
POC CREATININE URINE: 100
POC MICROALBUMIN URINE: 10
PROT UR STRIP-MCNC: NEGATIVE MG/DL
RBC # UR STRIP: NEGATIVE /UL
SP GR UR: 1.01 (ref 1–1.03)
UROBILINOGEN UR QL: NORMAL

## 2023-02-16 PROCEDURE — 82044 UR ALBUMIN SEMIQUANTITATIVE: CPT | Performed by: INTERNAL MEDICINE

## 2023-02-16 PROCEDURE — 99214 OFFICE O/P EST MOD 30 MIN: CPT | Performed by: INTERNAL MEDICINE

## 2023-02-16 PROCEDURE — 81003 URINALYSIS AUTO W/O SCOPE: CPT | Performed by: INTERNAL MEDICINE

## 2023-02-16 PROCEDURE — 36415 COLL VENOUS BLD VENIPUNCTURE: CPT | Performed by: INTERNAL MEDICINE

## 2023-02-16 RX ORDER — BUPROPION HYDROCHLORIDE 150 MG/1
150 TABLET ORAL DAILY
Qty: 30 TABLET | Refills: 2 | Status: SHIPPED | OUTPATIENT
Start: 2023-02-16

## 2023-02-16 NOTE — PROGRESS NOTES
Chief Complaint   Patient presents with   • Med Refill       History of Present Illness    The patient presents for a follow-up related to Type 2 Diabetes Mellitus. She checks her blood sugars at home. Her sugars are checked daily. The average sugars are in the 250-300 range. She denies hypoglycemic symptoms. The patient denies polyuria, polydypsia or polyphagia. The patient takes her medication as prescribed. She is taking insulin. Her injection sites are rotated appropriately. The patient does check her feet daily at home. She denies chest pain, shortness of breath, orthopnea, paroxysmal nocturnal dyspnea, dyspnea on exertion, edema, palpitations or syncope. She continues to follow with endocrinology.    The patient presents for a follow-up related to hypertension. The patient reports that she has had no headaches or blurred vision. She states that she is taking her medication as prescribed. She is not having medication side effects.    The patient presents for a follow-up related to depression. She denies currently having depression symptoms. She denies suicidal ideation. Her energy level is good. She denies agorophobia. She sleeps well. She is not tearful. She states that her current depression symptoms are stable. She is currently taking a medication. The current medication regimen consists of Paxil. The patient denies having medication side effects including nausea, headaches, anxiety, increased depression or fatigue.    The patient presents for a follow-up related to hyperlipidemia. She is following a low fat diet. She reports that she is exercising. She is taking atorvastatin. The patient is taking her medication as prescribed. She reports no medication side effects, including muscle cramps, abdominal pain, headaches or weakness.    Medications      Current Outpatient Medications:   •  amLODIPine (NORVASC) 5 MG tablet, Take 1 tablet by mouth Daily., Disp: 90 tablet, Rfl: 0  •  aspirin 81 MG tablet, Take  by  mouth daily., Disp: , Rfl:   •  atenolol (TENORMIN) 100 MG tablet, Take 1 tablet by mouth Daily., Disp: 90 tablet, Rfl: 1  •  atorvastatin (LIPITOR) 20 MG tablet, Take 1 tablet by mouth Every Night., Disp: 90 tablet, Rfl: 1  •  CILOXAN 0.3 % ophthalmic ointment, , Disp: , Rfl:   •  dorzolamide-timolol (COSOPT) 22.3-6.8 MG/ML ophthalmic solution, Apply  to eye 2 (two) times a day., Disp: , Rfl:   •  glipizide (GLUCOTROL) 5 MG tablet, Take one tablet by mouth twice daily before food, Disp: 180 tablet, Rfl: 1  •  glucose blood (True Metrix Blood Glucose Test) test strip, Use to test blood glucose once daily and as needed, Disp: 100 each, Rfl: 3  •  hydroCHLOROthiazide (HYDRODIURIL) 25 MG tablet, Take 1 tablet by mouth Daily., Disp: 90 tablet, Rfl: 0  •  insulin detemir (Levemir FlexTouch) 100 UNIT/ML injection, Inject 45 Units under the skin into the appropriate area as directed Every Night., Disp: 45 mL, Rfl: 1  •  Insulin Pen Needle 32G X 4 MM misc, Use daily with insulin, Disp: 100 each, Rfl: 3  •  Lancets misc, USE TO TEST ONCE DAILY AND AS NEEDED, Disp: 100 each, Rfl: 3  •  latanoprost (XALATAN) 0.005 % ophthalmic solution, , Disp: , Rfl:   •  lisinopril (PRINIVIL,ZESTRIL) 40 MG tablet, Take 1 tablet by mouth Daily., Disp: 90 tablet, Rfl: 0  •  metFORMIN ER (GLUCOPHAGE-XR) 500 MG 24 hr tablet, Take 2 tablets by mouth 2 (Two) Times a Day., Disp: 360 tablet, Rfl: 1  •  metroNIDAZOLE (METROGEL) 1 % gel, Apply  topically Daily., Disp: 60 g, Rfl: 3  •  Multiple Vitamins-Minerals (ICAPS AREDS FORMULA PO), Take  by mouth 2 (two) times a day., Disp: , Rfl:   •  mupirocin (BACTROBAN) 2 % ointment, Apply 1 application topically to the appropriate area as directed 3 (Three) Times a Day., Disp: , Rfl:   •  nystatin-triamcinolone (MYCOLOG II) 313656-4.1 UNIT/GM-% cream, Apply 1 application topically to the appropriate area as directed 2 (Two) Times a Day., Disp: , Rfl:   •  PARoxetine (PAXIL) 20 MG tablet, Take 1 tablet by  "mouth Every Morning., Disp: 90 tablet, Rfl: 1  •  prednisoLONE acetate (PRED FORTE) 1 % ophthalmic suspension, , Disp: , Rfl:   •  SITagliptin (Januvia) 100 MG tablet, Take 1 tablet by mouth Daily., Disp: 90 tablet, Rfl: 1  •  buPROPion XL (Wellbutrin XL) 150 MG 24 hr tablet, Take 1 tablet by mouth Daily., Disp: 30 tablet, Rfl: 2     Allergies    No Known Allergies    Problem List    Patient Active Problem List   Diagnosis   • Abnormal mammogram   • Anemia   • Depression   • Uncontrolled type 2 diabetes mellitus with hyperglycemia (HCC)   • Mixed hyperlipidemia   • Hypertension   • Anxiety   • Vitamin D deficiency       Medications, Allergies, Problems List and Past History were reviewed and updated.    Physical Examination    /70   Pulse 65   Temp 97.1 °F (36.2 °C) (Temporal)   Ht 154.9 cm (60.98\")   Wt 70.6 kg (155 lb 9.6 oz)   LMP  (LMP Unknown) Comment: Last pap 1/2020 by JEREMIE Watson  SpO2 98%   BMI 29.42 kg/m²     HEENT: Facies- Within normal limits. Lids- Normal bilaterally. Conjunctiva- Clear bilaterally. Sclera- Anicteric bilaterally.    Neck: Thyroid- non enlarged, symmetric and has no nodules. No bruits are detected. ROM- Normal Range of Motion with no rigidity.    Lungs: Auscultation- Clear to auscultation bilaterally. There are no retractions, clubbing or cyanosis. The Expiratory to Inspiratory ratio is equal.    Cardiovascular: There are no carotid bruits. Heart- Normal Rate with Regular rhythm and no murmurs. There are no gallops. There are no rubs. In the lower extremities there is no edema. The upper extremities do not have edema.    Abdomen: Soft, benign, non-tender with no masses, hernias, organomegaly or scars.    Impression and Assessment    Type 2 Diabetes Mellitus without complication without insulin usage.    Essential Hypertension.    Reactive Depression.    Hyperlipidemia.    Plan    Essential Hypertension Plan: The patient was instructed to continue the current " medications.    Hyperlipidemia Plan: The patient was instructed to exercise daily, eat a low fat diet and continue her medications.    Type 2 Diabetes Mellitus without complication without insulin usage Plan: The patient was instructed to continue the current medications.    Reactive Depression Plan: The patient was instructed to continue the current medications. Medication will be added as noted.    Diagnoses and all orders for this visit:    1. Type 2 diabetes mellitus without complication, without long-term current use of insulin (HCC) (Primary)  -     Comprehensive Metabolic Panel; Future  -     Hemoglobin A1c; Future  -     POC Microalbumin; Future    2. Hyperlipidemia, unspecified hyperlipidemia type  -     Comprehensive Metabolic Panel; Future  -     Lipid Panel; Future    3. Essential hypertension  -     CBC & Differential; Future  -     Comprehensive Metabolic Panel; Future  -     TSH; Future  -     POC Urinalysis Dipstick, Automated; Future    4. Reactive depression  -     buPROPion XL (Wellbutrin XL) 150 MG 24 hr tablet; Take 1 tablet by mouth Daily.  Dispense: 30 tablet; Refill: 2        Return to Office    The patient was instructed to return for follow-up in 4 months. The patient was instructed to return sooner if the condition changes, worsens, or does not resolve.

## 2023-02-17 LAB
ALBUMIN SERPL-MCNC: 4 G/DL (ref 3.5–5.2)
ALBUMIN/GLOB SERPL: 2.1 G/DL
ALP SERPL-CCNC: 82 U/L (ref 39–117)
ALT SERPL-CCNC: 26 U/L (ref 1–33)
AST SERPL-CCNC: 25 U/L (ref 1–32)
BASOPHILS # BLD AUTO: 0.04 10*3/MM3 (ref 0–0.2)
BASOPHILS NFR BLD AUTO: 1 % (ref 0–1.5)
BILIRUB SERPL-MCNC: 0.4 MG/DL (ref 0–1.2)
BUN SERPL-MCNC: 13 MG/DL (ref 8–23)
BUN/CREAT SERPL: 17.3 (ref 7–25)
CALCIUM SERPL-MCNC: 9.1 MG/DL (ref 8.6–10.5)
CHLORIDE SERPL-SCNC: 100 MMOL/L (ref 98–107)
CHOLEST SERPL-MCNC: 131 MG/DL (ref 0–200)
CO2 SERPL-SCNC: 28.1 MMOL/L (ref 22–29)
CREAT SERPL-MCNC: 0.75 MG/DL (ref 0.57–1)
EGFRCR SERPLBLD CKD-EPI 2021: 86.8 ML/MIN/1.73
EOSINOPHIL # BLD AUTO: 0.26 10*3/MM3 (ref 0–0.4)
EOSINOPHIL NFR BLD AUTO: 6.4 % (ref 0.3–6.2)
ERYTHROCYTE [DISTWIDTH] IN BLOOD BY AUTOMATED COUNT: 14.7 % (ref 12.3–15.4)
GLOBULIN SER CALC-MCNC: 1.9 GM/DL
GLUCOSE SERPL-MCNC: 341 MG/DL (ref 65–99)
HBA1C MFR BLD: 8.3 % (ref 4.8–5.6)
HCT VFR BLD AUTO: 39.1 % (ref 34–46.6)
HDLC SERPL-MCNC: 36 MG/DL (ref 40–60)
HGB BLD-MCNC: 12.6 G/DL (ref 12–15.9)
IMM GRANULOCYTES # BLD AUTO: 0.02 10*3/MM3 (ref 0–0.05)
IMM GRANULOCYTES NFR BLD AUTO: 0.5 % (ref 0–0.5)
LDLC SERPL CALC-MCNC: 59 MG/DL (ref 0–100)
LYMPHOCYTES # BLD AUTO: 1.19 10*3/MM3 (ref 0.7–3.1)
LYMPHOCYTES NFR BLD AUTO: 29.1 % (ref 19.6–45.3)
MCH RBC QN AUTO: 26.1 PG (ref 26.6–33)
MCHC RBC AUTO-ENTMCNC: 32.2 G/DL (ref 31.5–35.7)
MCV RBC AUTO: 81.1 FL (ref 79–97)
MONOCYTES # BLD AUTO: 0.43 10*3/MM3 (ref 0.1–0.9)
MONOCYTES NFR BLD AUTO: 10.5 % (ref 5–12)
NEUTROPHILS # BLD AUTO: 2.15 10*3/MM3 (ref 1.7–7)
NEUTROPHILS NFR BLD AUTO: 52.5 % (ref 42.7–76)
NRBC BLD AUTO-RTO: 0 /100 WBC (ref 0–0.2)
PLATELET # BLD AUTO: 121 10*3/MM3 (ref 140–450)
POTASSIUM SERPL-SCNC: 4.2 MMOL/L (ref 3.5–5.2)
PROT SERPL-MCNC: 5.9 G/DL (ref 6–8.5)
RBC # BLD AUTO: 4.82 10*6/MM3 (ref 3.77–5.28)
SODIUM SERPL-SCNC: 140 MMOL/L (ref 136–145)
TRIGL SERPL-MCNC: 218 MG/DL (ref 0–150)
TSH SERPL DL<=0.005 MIU/L-ACNC: 1.3 UIU/ML (ref 0.27–4.2)
VLDLC SERPL CALC-MCNC: 36 MG/DL (ref 5–40)
WBC # BLD AUTO: 4.09 10*3/MM3 (ref 3.4–10.8)

## 2023-02-20 DIAGNOSIS — Z79.4 CONTROLLED TYPE 2 DIABETES MELLITUS WITH DIABETIC POLYNEUROPATHY, WITH LONG-TERM CURRENT USE OF INSULIN: Chronic | ICD-10-CM

## 2023-02-20 DIAGNOSIS — E11.42 CONTROLLED TYPE 2 DIABETES MELLITUS WITH DIABETIC POLYNEUROPATHY, WITH LONG-TERM CURRENT USE OF INSULIN: Chronic | ICD-10-CM

## 2023-02-20 RX ORDER — INSULIN DETEMIR 100 [IU]/ML
50 INJECTION, SOLUTION SUBCUTANEOUS NIGHTLY
Qty: 45 ML | Refills: 1 | Status: SHIPPED | OUTPATIENT
Start: 2023-02-20

## 2023-02-22 ENCOUNTER — OFFICE VISIT (OUTPATIENT)
Dept: ENDOCRINOLOGY | Facility: CLINIC | Age: 68
End: 2023-02-22
Payer: MEDICARE

## 2023-02-22 VITALS
BODY MASS INDEX: 29.27 KG/M2 | SYSTOLIC BLOOD PRESSURE: 128 MMHG | DIASTOLIC BLOOD PRESSURE: 76 MMHG | OXYGEN SATURATION: 95 % | WEIGHT: 155 LBS | HEART RATE: 66 BPM | HEIGHT: 61 IN

## 2023-02-22 DIAGNOSIS — E11.65 UNCONTROLLED TYPE 2 DIABETES MELLITUS WITH HYPERGLYCEMIA: Primary | Chronic | ICD-10-CM

## 2023-02-22 DIAGNOSIS — E78.2 MIXED HYPERLIPIDEMIA: Chronic | ICD-10-CM

## 2023-02-22 LAB
EXPIRATION DATE: ABNORMAL
GLUCOSE BLDC GLUCOMTR-MCNC: 192 MG/DL (ref 70–130)
Lab: ABNORMAL

## 2023-02-22 PROCEDURE — 82947 ASSAY GLUCOSE BLOOD QUANT: CPT | Performed by: PHYSICIAN ASSISTANT

## 2023-02-22 PROCEDURE — 3052F HG A1C>EQUAL 8.0%<EQUAL 9.0%: CPT | Performed by: PHYSICIAN ASSISTANT

## 2023-02-22 PROCEDURE — 99214 OFFICE O/P EST MOD 30 MIN: CPT | Performed by: PHYSICIAN ASSISTANT

## 2023-02-22 NOTE — PROGRESS NOTES
Chief Complaint  F/u for Diabetes Mellitus.    HPI   Zeinab Rodriguez is a 67 y.o. female who is here today for f/u of Diabetes Mellitus type 2. The initial diagnosis of diabetes was made approximately 2009.     Stopped taking Jardiance due to cost, $90 days for 3 month supply.     Diabetic complications: peripheral neuropathy- tingling  Eye exam current (within one year): utd 12/2021 Dr Akbar at [facility]    Current diabetic medications include:  Metformin ER 500mg 2 tab BID - tolerating better than immediate release  Glipizide 5mg BID  Januvia 100mg QD   Jardiance 10mg daily  Levemir 50U QHS    Statin: lipitor 20    Past medications: none    Diabetic Monitoring  -   No meter or log for review.      The following portions of the patient's history were reviewed and updated by me as appropriate: allergies, current medications, past family history, past social history, past surgical history and problem list.      Past Medical History:   Diagnosis Date   • Anxiety    • Breast cancer (HCC)    • Depression    • Diabetes mellitus (HCC)    • Glaucoma    • Hypertension    • Myopic degeneration        Medications    Current Outpatient Medications:   •  amLODIPine (NORVASC) 5 MG tablet, Take 1 tablet by mouth Daily., Disp: 90 tablet, Rfl: 0  •  aspirin 81 MG tablet, Take  by mouth daily., Disp: , Rfl:   •  atenolol (TENORMIN) 100 MG tablet, Take 1 tablet by mouth Daily., Disp: 90 tablet, Rfl: 1  •  atorvastatin (LIPITOR) 20 MG tablet, Take 1 tablet by mouth Every Night., Disp: 90 tablet, Rfl: 1  •  buPROPion XL (Wellbutrin XL) 150 MG 24 hr tablet, Take 1 tablet by mouth Daily., Disp: 30 tablet, Rfl: 2  •  CILOXAN 0.3 % ophthalmic ointment, Administer 1 application to the right eye Daily., Disp: , Rfl:   •  dorzolamide-timolol (COSOPT) 22.3-6.8 MG/ML ophthalmic solution, Apply  to eye 2 (two) times a day., Disp: , Rfl:   •  glipizide (GLUCOTROL) 5 MG tablet, Take one tablet by mouth twice daily before food,  "Disp: 180 tablet, Rfl: 1  •  glucose blood (True Metrix Blood Glucose Test) test strip, Use to test blood glucose once daily and as needed, Disp: 100 each, Rfl: 3  •  hydroCHLOROthiazide (HYDRODIURIL) 25 MG tablet, Take 1 tablet by mouth Daily., Disp: 90 tablet, Rfl: 0  •  insulin detemir (Levemir FlexTouch) 100 UNIT/ML injection, Inject 50 Units under the skin into the appropriate area as directed Every Night., Disp: 45 mL, Rfl: 1  •  Insulin Pen Needle 32G X 4 MM misc, Use daily with insulin, Disp: 100 each, Rfl: 3  •  Lancets misc, USE TO TEST ONCE DAILY AND AS NEEDED, Disp: 100 each, Rfl: 3  •  lisinopril (PRINIVIL,ZESTRIL) 40 MG tablet, Take 1 tablet by mouth Daily., Disp: 90 tablet, Rfl: 0  •  metFORMIN ER (GLUCOPHAGE-XR) 500 MG 24 hr tablet, Take 2 tablets by mouth 2 (Two) Times a Day., Disp: 360 tablet, Rfl: 1  •  metroNIDAZOLE (METROGEL) 1 % gel, Apply  topically Daily., Disp: 60 g, Rfl: 3  •  Multiple Vitamins-Minerals (ICAPS AREDS FORMULA PO), Take  by mouth 2 (two) times a day., Disp: , Rfl:   •  mupirocin (BACTROBAN) 2 % ointment, Apply 1 application topically to the appropriate area as directed 3 (Three) Times a Day., Disp: , Rfl:   •  nystatin-triamcinolone (MYCOLOG II) 260058-3.1 UNIT/GM-% cream, Apply 1 application topically to the appropriate area as directed 2 (Two) Times a Day., Disp: , Rfl:   •  PARoxetine (PAXIL) 20 MG tablet, Take 1 tablet by mouth Every Morning., Disp: 90 tablet, Rfl: 1  •  SITagliptin (Januvia) 100 MG tablet, Take 1 tablet by mouth Daily., Disp: 90 tablet, Rfl: 1    Review of Systems  Review of Systems   All other systems reviewed and are negative.       Objective   /76   Pulse 66   Ht 154.9 cm (61\")   Wt 70.3 kg (155 lb)   LMP  (LMP Unknown) Comment: Last pap 1/2020 by JEREMIE Watson  SpO2 95%   BMI 29.29 kg/m² Body mass index is 29.29 kg/m².    Physical Exam  Constitutional:       General: She is not in acute distress.     Appearance: She is well-developed. " She is not diaphoretic.   HENT:      Head: Normocephalic.      Right Ear: External ear normal.      Left Ear: External ear normal.   Eyes:      General: Lids are normal.         Right eye: No discharge.         Left eye: No discharge.      Conjunctiva/sclera: Conjunctivae normal.   Neck:      Thyroid: No thyroid mass or thyromegaly.      Vascular: No JVD.      Trachea: No tracheal deviation.   Cardiovascular:      Rate and Rhythm: Normal rate and regular rhythm.      Pulses:           Dorsalis pedis pulses are 2+ on the right side and 2+ on the left side.        Posterior tibial pulses are 2+ on the right side and 2+ on the left side.      Heart sounds: Normal heart sounds. No murmur heard.  Pulmonary:      Effort: Pulmonary effort is normal. No respiratory distress.      Breath sounds: Normal breath sounds. No wheezing.   Musculoskeletal:         General: No tenderness.      Right foot: No deformity or Charcot foot.      Left foot: No deformity or Charcot foot.   Feet:      Right foot:      Protective Sensation: 5 sites tested. 5 sites sensed.      Skin integrity: No ulcer, blister, skin breakdown, erythema, warmth or callus.      Left foot:      Protective Sensation: 5 sites tested. 5 sites sensed.      Skin integrity: No ulcer, blister, skin breakdown, erythema, warmth or callus.      Comments: Diabetic Foot Exam Performed and Monofilament Test Performed      Lymphadenopathy:      Cervical: No cervical adenopathy.   Skin:     General: Skin is warm and dry.      Findings: No erythema or rash.   Neurological:      Mental Status: She is alert and oriented to person, place, and time.   Psychiatric:         Speech: Speech normal.         Behavior: Behavior normal.         Thought Content: Thought content normal.             Labs and Imaging   Lab Results   Component Value Date    HGBA1C 8.30 (H) 02/16/2023    HGBA1C 6.7 07/11/2022    HGBA1C 6.5 03/04/2022     Office Visit on 02/22/2023   Component Date Value Ref Range  Status   • Glucose 02/22/2023 192 (A)  70 - 130 mg/dL Final   • Lot Number 02/22/2023 [id number]   Final   • Expiration Date 02/22/2023 07-   Final   Office Visit on 02/16/2023   Component Date Value Ref Range Status   • Microalbumin, Urine 02/16/2023 10   Final   • Creatinine, Urine 02/16/2023 100   Final   • Albumin/Creatinine Ratio, Urine 02/16/2023 <30   Final   • Lot Number 02/16/2023 [id number]   Final   • Expiration Date 02/16/2023 10/31/23   Final   • Color 02/16/2023 Yellow  Yellow, Straw, Dark Yellow, Kerry Final   • Clarity, UA 02/16/2023 Clear  Clear Final   • Specific Gravity  02/16/2023 1.015  1.005 - 1.030 Final   • pH, Urine 02/16/2023 5.0  5.0 - 8.0 Final   • Leukocytes 02/16/2023 Negative  Negative Final   • Nitrite, UA 02/16/2023 Negative  Negative Final   • Protein, POC 02/16/2023 Negative  Negative mg/dL Final   • Glucose, UA 02/16/2023 1000 mg/dL (A)  Negative mg/dL Final   • Ketones, UA 02/16/2023 Negative  Negative Final   • Urobilinogen, UA 02/16/2023 Normal  Normal, 0.2 E.U./dL Final   • Bilirubin 02/16/2023 Negative  Negative Final   • Blood, UA 02/16/2023 Negative  Negative Final   • Lot Number 02/16/2023 [id number]   Final   • Expiration Date 02/16/2023 11/30/23   Final   • TSH 02/16/2023 1.300  0.270 - 4.200 uIU/mL Final   • Total Cholesterol 02/16/2023 131  0 - 200 mg/dL Final    Comment: Cholesterol Reference Ranges  (U.S. Department of Health and Human Services ATP III  Classifications)  Desirable          <200 mg/dL  Borderline High    200-239 mg/dL  High Risk          >240 mg/dL  Triglyceride Reference Ranges  (U.S. Department of Health and Human Services ATP III  Classifications)  Normal           <150 mg/dL  Borderline High  150-199 mg/dL  High             200-499 mg/dL  Very High        >500 mg/dL  HDL Reference Ranges  (U.S. Department of Health and Human Services ATP III  Classifications)  Low     <40 mg/dl (major risk factor for CHD)  High    >60 mg/dl ('negative' risk  factor for CHD)  LDL Reference Ranges  (U.S. Department of Health and Human Services ATP III  Classifications)  Optimal          <100 mg/dL  Near Optimal     100-129 mg/dL  Borderline High  130-159 mg/dL  High             160-189 mg/dL  Very High        >189 mg/dL     • Triglycerides 02/16/2023 218 (H)  0 - 150 mg/dL Final   • HDL Cholesterol 02/16/2023 36 (L)  40 - 60 mg/dL Final   • VLDL Cholesterol [place] 02/16/2023 36  5 - 40 mg/dL Final   • LDL Chol Calc (NIH) 02/16/2023 59  0 - 100 mg/dL Final   • Hemoglobin A1C 02/16/2023 8.30 (H)  4.80 - 5.60 % Final    Comment: Hemoglobin A1C Ranges:  Increased Risk for Diabetes  5.7% to 6.4%  Diabetes                     >= 6.5%  Diabetic Goal                < 7.0%     • Glucose 02/16/2023 341 (H)  65 - 99 mg/dL Final   • BUN 02/16/2023 13  8 - 23 mg/dL Final   • Creatinine 02/16/2023 0.75  0.57 - 1.00 mg/dL Final   • EGFR Result 02/16/2023 86.8  >60.0 mL/min/1.73 Final    Comment: GFR Normal >60  Chronic Kidney Disease <60  Kidney Failure <15     • BUN/Creatinine Ratio 02/16/2023 17.3  7.0 - 25.0 Final   • Sodium 02/16/2023 140  136 - 145 mmol/L Final   • Potassium 02/16/2023 4.2  3.5 - 5.2 mmol/L Final   • Chloride 02/16/2023 100  98 - 107 mmol/L Final   • Total CO2 02/16/2023 28.1  22.0 - 29.0 mmol/L Final   • Calcium 02/16/2023 9.1  8.6 - 10.5 mg/dL Final   • Total Protein 02/16/2023 5.9 (L)  6.0 - 8.5 g/dL Final   • Albumin 02/16/2023 4.0  3.5 - 5.2 g/dL Final   • Globulin 02/16/2023 1.9  gm/dL Final   • A/G Ratio 02/16/2023 2.1  g/dL Final   • Total Bilirubin 02/16/2023 0.4  0.0 - 1.2 mg/dL Final   • Alkaline Phosphatase 02/16/2023 82  39 - 117 U/L Final   • AST (SGOT) 02/16/2023 25  1 - 32 U/L Final   • ALT (SGPT) 02/16/2023 26  1 - 33 U/L Final   • WBC 02/16/2023 4.09  3.40 - 10.80 10*3/mm3 Final   • RBC 02/16/2023 4.82  3.77 - 5.28 10*6/mm3 Final   • Hemoglobin 02/16/2023 12.6  12.0 - 15.9 g/dL Final   • Hematocrit 02/16/2023 39.1  34.0 - 46.6 % Final   • MCV  02/16/2023 81.1  79.0 - 97.0 fL Final   • MCH 02/16/2023 26.1 (L)  26.6 - 33.0 pg Final   • MCHC 02/16/2023 32.2  31.5 - 35.7 g/dL Final   • RDW 02/16/2023 14.7  12.3 - 15.4 % Final   • Platelets 02/16/2023 121 (L)  140 - 450 10*3/mm3 Final   • Neutrophil Rel % 02/16/2023 52.5  42.7 - 76.0 % Final   • Lymphocyte Rel % 02/16/2023 29.1  19.6 - 45.3 % Final   • Monocyte Rel % 02/16/2023 10.5  5.0 - 12.0 % Final   • Eosinophil Rel % 02/16/2023 6.4 (H)  0.3 - 6.2 % Final   • Basophil Rel % 02/16/2023 1.0  0.0 - 1.5 % Final   • Neutrophils Absolute 02/16/2023 2.15  1.70 - 7.00 10*3/mm3 Final   • Lymphocytes Absolute 02/16/2023 1.19  0.70 - 3.10 10*3/mm3 Final   • Monocytes Absolute 02/16/2023 0.43  0.10 - 0.90 10*3/mm3 Final   • Eosinophils Absolute 02/16/2023 0.26  0.00 - 0.40 10*3/mm3 Final   • Basophils Absolute 02/16/2023 0.04  0.00 - 0.20 10*3/mm3 Final   • Immature Granulocyte Rel % 02/16/2023 0.5  0.0 - 0.5 % Final   • Immature Grans Absolute 02/16/2023 0.02  0.00 - 0.05 10*3/mm3 Final   • nRBC 02/16/2023 0.0  0.0 - 0.2 /100 WBC Final     No images are attached to the encounter or orders placed in the encounter.  No Images in the past 120 days found..    Assessment / Plan   Diagnoses and all orders for this visit:    1. Uncontrolled type 2 diabetes mellitus with hyperglycemia (HCC) (Primary)  -     POC Glucose, Blood    2. Mixed hyperlipidemia        Diabetes Mellitus 2 is under good control.  -A1c 8.3 2/16/2023  -Patient is not willing to make any adjustments to her medications  -We will try to get patient assistance for her Jardiance  -continue metformin ER 500mg 2 pills 2x/day.  -continue levemir 50 u qHS  -Continue Januvia 100mg daily  -continue glipizide 5mg 2x/day AC  -Would not increase Levemir or glipizide due to high risk of hypoglycemia  -eye exam was done 12/2022, foot exam updated today, labs updated 2/2023    Hyperlipidemia  -LDL within guidelines  -continue lipitor 20 mg qhs        There are no  Patient Instructions on file for this visit.    Follow up: Return in about 3 months (around 5/22/2023).      Rosario Claudio PA-C

## 2023-03-02 ENCOUNTER — TELEPHONE (OUTPATIENT)
Dept: ENDOCRINOLOGY | Facility: CLINIC | Age: 68
End: 2023-03-02

## 2023-03-03 NOTE — TELEPHONE ENCOUNTER
Spoke with patient and she gave me her income info for me to submit her pt assistance for jardiance. Told her I would call her once I receive a determination from [employer]. She voiced understanding.

## 2023-03-13 ENCOUNTER — TELEPHONE (OUTPATIENT)
Dept: ENDOCRINOLOGY | Facility: CLINIC | Age: 68
End: 2023-03-13
Payer: MEDICARE

## 2023-03-13 NOTE — TELEPHONE ENCOUNTER
Called patient to let her know that her application for Jardiance pt assistance was denied due to her income exceeding program eligibility. She voiced understanding.

## 2023-03-23 DIAGNOSIS — F41.9 ANXIETY: ICD-10-CM

## 2023-03-23 DIAGNOSIS — F32.89 OTHER DEPRESSION: ICD-10-CM

## 2023-03-23 RX ORDER — PAROXETINE HYDROCHLORIDE 20 MG/1
20 TABLET, FILM COATED ORAL EVERY MORNING
Qty: 90 TABLET | Refills: 1 | Status: SHIPPED | OUTPATIENT
Start: 2023-03-23

## 2023-04-06 RX ORDER — AMLODIPINE BESYLATE 5 MG/1
5 TABLET ORAL DAILY
Qty: 90 TABLET | Refills: 1 | Status: SHIPPED | OUTPATIENT
Start: 2023-04-06

## 2023-04-06 RX ORDER — LISINOPRIL 40 MG/1
40 TABLET ORAL DAILY
Qty: 90 TABLET | Refills: 1 | Status: SHIPPED | OUTPATIENT
Start: 2023-04-06

## 2023-04-06 RX ORDER — HYDROCHLOROTHIAZIDE 25 MG/1
25 TABLET ORAL DAILY
Qty: 90 TABLET | Refills: 1 | Status: SHIPPED | OUTPATIENT
Start: 2023-04-06

## 2023-05-15 DIAGNOSIS — E11.42 CONTROLLED TYPE 2 DIABETES MELLITUS WITH DIABETIC POLYNEUROPATHY, WITH LONG-TERM CURRENT USE OF INSULIN: Chronic | ICD-10-CM

## 2023-05-15 DIAGNOSIS — Z79.4 CONTROLLED TYPE 2 DIABETES MELLITUS WITH HYPOGLYCEMIA, WITH LONG-TERM CURRENT USE OF INSULIN: Chronic | ICD-10-CM

## 2023-05-15 DIAGNOSIS — Z79.4 CONTROLLED TYPE 2 DIABETES MELLITUS WITH DIABETIC POLYNEUROPATHY, WITH LONG-TERM CURRENT USE OF INSULIN: Chronic | ICD-10-CM

## 2023-05-15 DIAGNOSIS — E11.65 UNCONTROLLED TYPE 2 DIABETES MELLITUS WITH HYPERGLYCEMIA: ICD-10-CM

## 2023-05-15 DIAGNOSIS — E11.649 CONTROLLED TYPE 2 DIABETES MELLITUS WITH HYPOGLYCEMIA, WITH LONG-TERM CURRENT USE OF INSULIN: Chronic | ICD-10-CM

## 2023-05-15 RX ORDER — METFORMIN HYDROCHLORIDE 500 MG/1
1000 TABLET, EXTENDED RELEASE ORAL 2 TIMES DAILY
Qty: 360 TABLET | Refills: 1 | Status: SHIPPED | OUTPATIENT
Start: 2023-05-15

## 2023-05-15 RX ORDER — INSULIN DETEMIR 100 [IU]/ML
50 INJECTION, SOLUTION SUBCUTANEOUS NIGHTLY
Qty: 45 ML | Refills: 1 | Status: SHIPPED | OUTPATIENT
Start: 2023-05-15

## 2023-05-15 RX ORDER — GLIPIZIDE 5 MG/1
TABLET ORAL
Qty: 180 TABLET | Refills: 1 | Status: SHIPPED | OUTPATIENT
Start: 2023-05-15

## 2023-05-17 RX ORDER — ATENOLOL 100 MG/1
100 TABLET ORAL DAILY
Qty: 90 TABLET | Refills: 1 | Status: SHIPPED | OUTPATIENT
Start: 2023-05-17

## 2023-06-07 DIAGNOSIS — F32.9 REACTIVE DEPRESSION: ICD-10-CM

## 2023-06-07 RX ORDER — BUPROPION HYDROCHLORIDE 150 MG/1
150 TABLET ORAL DAILY
Qty: 30 TABLET | Refills: 2 | Status: SHIPPED | OUTPATIENT
Start: 2023-06-07

## 2023-06-07 NOTE — TELEPHONE ENCOUNTER
Caller: Zeinab Rodriguez    Relationship: Self    Best call back number: [phone]     Requested Prescriptions:   Requested Prescriptions     Pending Prescriptions Disp Refills    buPROPion XL (Wellbutrin XL) 150 MG 24 hr tablet 30 tablet 2     Sig: Take 1 tablet by mouth Daily.        Pharmacy where request should be sent: [address] [phone] [facility] [phone]     Last office visit with prescribing clinician: 2/16/2023   Last telemedicine visit with prescribing clinician: Visit date not found   Next office visit with prescribing clinician: 6/21/2023         Does the patient have less than a 3 day supply:  [x] Yes  [] No    Would you like a call back once the refill request has been completed: [] Yes [x] No    If the office needs to give you a call back, can they leave a voicemail: [] Yes [x] No    Veronica Delgado   06/07/23 16:04 EDT

## 2023-08-14 DIAGNOSIS — E11.40 CONTROLLED TYPE 2 DIABETES WITH NEUROPATHY: Chronic | ICD-10-CM

## 2023-08-14 NOTE — TELEPHONE ENCOUNTER
Caller: [facility]    Relationship: SELF    Best call back number: [phone]    Requested Prescriptions: JANUVIA 100 MG TABLET  Requested Prescriptions     Pending Prescriptions Disp Refills    SITagliptin (Januvia) 100 MG tablet 90 tablet 1     Sig: Take 1 tablet by mouth Daily.        Pharmacy where request should be sent:  [facility] [phone]    Last office visit with prescribing clinician: Visit date not found   Last telemedicine visit with prescribing clinician: Visit date not found   Next office visit with prescribing clinician: Visit date not found     Additional details provided by patient:     Does the patient have less than a 3 day supply:  [x] Yes  [] No    Would you like a call back once the refill request has been completed: [x] Yes [] No    If the office needs to give you a call back, can they leave a voicemail: [x] Yes [] No    Veronica Rai Rep   08/14/23 15:21 EDT

## 2023-09-11 ENCOUNTER — OFFICE VISIT (OUTPATIENT)
Dept: ENDOCRINOLOGY | Facility: CLINIC | Age: 68
End: 2023-09-11
Payer: MEDICARE

## 2023-09-11 VITALS
HEIGHT: 61 IN | WEIGHT: 156 LBS | OXYGEN SATURATION: 96 % | HEART RATE: 71 BPM | DIASTOLIC BLOOD PRESSURE: 69 MMHG | BODY MASS INDEX: 29.45 KG/M2 | SYSTOLIC BLOOD PRESSURE: 122 MMHG

## 2023-09-11 DIAGNOSIS — E78.2 MIXED HYPERLIPIDEMIA: ICD-10-CM

## 2023-09-11 DIAGNOSIS — E11.65 TYPE 2 DIABETES MELLITUS WITH HYPERGLYCEMIA, WITH LONG-TERM CURRENT USE OF INSULIN: Primary | ICD-10-CM

## 2023-09-11 DIAGNOSIS — Z79.4 TYPE 2 DIABETES MELLITUS WITH HYPERGLYCEMIA, WITH LONG-TERM CURRENT USE OF INSULIN: Primary | ICD-10-CM

## 2023-09-11 LAB
EXPIRATION DATE: ABNORMAL
EXPIRATION DATE: NORMAL
GLUCOSE BLDC GLUCOMTR-MCNC: 210 MG/DL (ref 70–130)
HBA1C MFR BLD: 7.7 %
Lab: ABNORMAL
Lab: NORMAL

## 2023-09-11 PROCEDURE — 1159F MED LIST DOCD IN RCRD: CPT | Performed by: PHYSICIAN ASSISTANT

## 2023-09-11 PROCEDURE — 3078F DIAST BP <80 MM HG: CPT | Performed by: PHYSICIAN ASSISTANT

## 2023-09-11 PROCEDURE — 3074F SYST BP LT 130 MM HG: CPT | Performed by: PHYSICIAN ASSISTANT

## 2023-09-11 PROCEDURE — 99214 OFFICE O/P EST MOD 30 MIN: CPT | Performed by: PHYSICIAN ASSISTANT

## 2023-09-11 PROCEDURE — 1160F RVW MEDS BY RX/DR IN RCRD: CPT | Performed by: PHYSICIAN ASSISTANT

## 2023-09-11 PROCEDURE — 83036 HEMOGLOBIN GLYCOSYLATED A1C: CPT | Performed by: PHYSICIAN ASSISTANT

## 2023-09-11 PROCEDURE — 3051F HG A1C>EQUAL 7.0%<8.0%: CPT | Performed by: PHYSICIAN ASSISTANT

## 2023-09-11 PROCEDURE — 82947 ASSAY GLUCOSE BLOOD QUANT: CPT | Performed by: PHYSICIAN ASSISTANT

## 2023-09-11 RX ORDER — CALCIUM CITRATE/VITAMIN D3 200MG-6.25
TABLET ORAL
Qty: 100 EACH | Refills: 3 | Status: SHIPPED | OUTPATIENT
Start: 2023-09-11

## 2023-09-11 RX ORDER — LANCETS 30 GAUGE
EACH MISCELLANEOUS
Qty: 100 EACH | Refills: 3 | Status: SHIPPED | OUTPATIENT
Start: 2023-09-11

## 2023-09-11 NOTE — PROGRESS NOTES
"     Office Note      Date: 2023  Patient Name: Zeinab Rodriguez  MRN: [id number]  : 1955    Chief Complaint   Patient presents with    Diabetes       History of Present Illness:   Zeinab Rodriguez is a 68 y.o. female who presents for follow-up for type 2 diabetes diagnosed in .  She typically sees Rosario.  She has been off Jardiance due to cost and was denied patient assistance for this medication.  She remains on metformin extended release 500 mg 2 tablets twice a day, glipizide 5 mg twice a day, Januvia 100 mg daily and Levemir 45 units daily.  She reports she checks her blood sugar a few times a week and reports these are typically 80s to 90s or low 100s in the mornings, around 150 to 180 mg/dL in the afternoons and in the high 100s or low 200s in the evenings.  She denies any severe or frequent hypoglycemia.  She reports she has been trying to walk more regularly in an effort to get her diabetes under better control.  She recently had fasting lab work completed with Dr. Almazan in .  She is up-to-date on her eye exam she goes every 3 to 4 months.  She denies any trouble with her feet today.  Rosario checked her feet at her appointment in February.      Subjective     Review of Systems:   Review of Systems   Constitutional: Negative.    Cardiovascular: Negative.    Gastrointestinal: Negative.    Endocrine: Negative.    Neurological: Negative.      The following portions of the patient's history were reviewed and updated as appropriate: allergies, current medications, past family history, past medical history, past social history, past surgical history, and problem list.    Objective     Vitals:    23 1058   BP: 122/69   Pulse: 71   SpO2: 96%   Weight: 70.8 kg (156 lb)   Height: 154.9 cm (61\")     Body mass index is 29.48 kg/m².    Physical Exam  Vitals reviewed.   Constitutional:       General: She is not in acute distress.     Appearance: Normal appearance.   Neurological:      " Mental Status: She is alert.       HEMOGLOBIN A1C  Lab Results   Component Value Date    HGBA1C 7.7 09/11/2023       GLUCOSE  Glucose   Date Value Ref Range Status   09/11/2023 210 (A) 70 - 130 mg/dL Final       CMP  Lab Results   Component Value Date    GLUCOSE 224 (H) 06/21/2023    BUN 13 06/21/2023    CREATININE 0.67 06/21/2023    EGFRIFNONA 82 11/08/2021    EGFRIFAFRI 100 11/08/2021    BCR 19.4 06/21/2023    K 3.9 06/21/2023    CO2 28.8 06/21/2023    CALCIUM 9.5 06/21/2023    PROTENTOTREF 6.3 06/21/2023    LABIL2 2.2 06/21/2023    AST 21 06/21/2023    ALT 23 06/21/2023       LIPID PANEL  Lab Results   Component Value Date    CHOL 194 09/15/2016    CHLPL 118 06/21/2023    TRIG 201 (H) 06/21/2023    HDL 38 (L) 06/21/2023    LDL 47 06/21/2023       URINE MICROALBUMIN/CREATININE RATIO  Microalbumin/Creatinine Ratio   Date Value Ref Range Status   11/08/2021 8 0 - 29 mg/g creat Final     Comment:                            Normal:                0 -  29                         Moderately increased: 30 - 300                         Severely increased:       >300         TSH  Lab Results   Component Value Date    TSH 1.170 06/21/2023       Assessment / Plan      Assessment & Plan:  1. Type 2 diabetes mellitus with hyperglycemia, with long-term current use of insulin  Her hemoglobin A1c today has improved as compared to February at 7.7%.  This is still above goal.  For now she will continue Levemir 45 units daily, Januvia 100 mg daily, glipizide 5 mg twice a day and metformin extended release 500 mg 2 tablets twice a day.  We discussed considering adjusting her insulin but due to her morning blood sugar readings I would worry about hypoglycemia if we increase this.  We did not make any changes in her medications today.  Her weight is stable.  I encouraged continued walking and healthy eating habits.  Her blood pressure is okay today.  I refilled her testing supplies and insulin pen needles today.  - POC Glucose,  Blood  - POC Glycosylated Hemoglobin (Hb A1C)  - True Metrix Blood Glucose Test test strip; Use to test blood glucose once daily and as needed  Dispense: 100 each; Refill: 3  - Lancets misc; USE TO TEST ONCE DAILY AND AS NEEDED  Dispense: 100 each; Refill: 3  - Insulin Pen Needle 32G X 4 MM misc; Use daily with insulin  Dispense: 100 each; Refill: 3    2. Mixed hyperlipidemia  I reviewed her fasting cholesterol numbers from June.  Her triglycerides were elevated but her hemoglobin A1c is doing better.  She will continue atorvastatin 20 mg daily for now.      Return in about 3 months (around 12/11/2023) for Recheck 3-4 mos pamella Ponce.     This note was dictated using Dragon voice recognition.    Nola De Dios PA-C  09/11/2023

## 2023-09-15 DIAGNOSIS — F32.9 REACTIVE DEPRESSION: ICD-10-CM

## 2023-09-15 RX ORDER — BUPROPION HYDROCHLORIDE 150 MG/1
150 TABLET ORAL DAILY
Qty: 30 TABLET | Refills: 2 | Status: SHIPPED | OUTPATIENT
Start: 2023-09-15

## 2023-09-15 NOTE — TELEPHONE ENCOUNTER
Caller: Zeinab Rodriguez    Relationship: Self    Best call back number: [phone]     Requested Prescriptions:   Requested Prescriptions     Pending Prescriptions Disp Refills    buPROPion XL (Wellbutrin XL) 150 MG 24 hr tablet 30 tablet 2     Sig: Take 1 tablet by mouth Daily.        Pharmacy where request should be sent: [address] [phone] [facility] [phone]      Last office visit with prescribing clinician: 6/21/2023   Last telemedicine visit with prescribing clinician: Visit date not found   Next office visit with prescribing clinician: 10/5/2023     Additional details provided by patient: REQUEST 90 DAY SUPPLY    Does the patient have less than a 3 day supply:  [x] Yes  [] No    Would you like a call back once the refill request has been completed: [] Yes [x] No    If the office needs to give you a call back, can they leave a voicemail: [] Yes [x] No    Veronica Delgado Rep   09/15/23 11:45 EDT

## 2023-10-05 ENCOUNTER — HOSPITAL ENCOUNTER (OUTPATIENT)
Dept: GENERAL RADIOLOGY | Facility: HOSPITAL | Age: 68
Discharge: HOME | End: 2023-10-05
Admitting: INTERNAL MEDICINE
Payer: MEDICARE

## 2023-10-05 ENCOUNTER — OFFICE VISIT (OUTPATIENT)
Dept: INTERNAL MEDICINE | Facility: CLINIC | Age: 68
End: 2023-10-05
Payer: MEDICARE

## 2023-10-05 VITALS
HEART RATE: 72 BPM | DIASTOLIC BLOOD PRESSURE: 68 MMHG | SYSTOLIC BLOOD PRESSURE: 128 MMHG | WEIGHT: 156 LBS | BODY MASS INDEX: 29.48 KG/M2 | TEMPERATURE: 98 F | RESPIRATION RATE: 16 BRPM

## 2023-10-05 DIAGNOSIS — M54.41 ACUTE RIGHT-SIDED LOW BACK PAIN WITH RIGHT-SIDED SCIATICA: Primary | ICD-10-CM

## 2023-10-05 DIAGNOSIS — M54.41 ACUTE RIGHT-SIDED LOW BACK PAIN WITH RIGHT-SIDED SCIATICA: ICD-10-CM

## 2023-10-05 LAB
BILIRUB BLD-MCNC: NEGATIVE MG/DL
CLARITY, POC: CLEAR
COLOR UR: YELLOW
EXPIRATION DATE: ABNORMAL
GLUCOSE UR STRIP-MCNC: NEGATIVE MG/DL
KETONES UR QL: ABNORMAL
LEUKOCYTE EST, POC: NEGATIVE
Lab: ABNORMAL
NITRITE UR-MCNC: NEGATIVE MG/ML
PH UR: 5 [PH] (ref 5–8)
PROT UR STRIP-MCNC: NEGATIVE MG/DL
RBC # UR STRIP: NEGATIVE /UL
SP GR UR: 1.02 (ref 1–1.03)
UROBILINOGEN UR QL: NORMAL

## 2023-10-05 PROCEDURE — 81003 URINALYSIS AUTO W/O SCOPE: CPT | Performed by: INTERNAL MEDICINE

## 2023-10-05 PROCEDURE — 72100 X-RAY EXAM L-S SPINE 2/3 VWS: CPT

## 2023-10-05 PROCEDURE — 3074F SYST BP LT 130 MM HG: CPT | Performed by: INTERNAL MEDICINE

## 2023-10-05 PROCEDURE — 3078F DIAST BP <80 MM HG: CPT | Performed by: INTERNAL MEDICINE

## 2023-10-05 PROCEDURE — 99214 OFFICE O/P EST MOD 30 MIN: CPT | Performed by: INTERNAL MEDICINE

## 2023-10-05 PROCEDURE — 3051F HG A1C>EQUAL 7.0%<8.0%: CPT | Performed by: INTERNAL MEDICINE

## 2023-10-05 RX ORDER — PAROXETINE HYDROCHLORIDE 20 MG/1
20 TABLET, FILM COATED ORAL EVERY MORNING
Qty: 90 TABLET | Refills: 1 | Status: SHIPPED | OUTPATIENT
Start: 2023-10-05

## 2023-10-05 RX ORDER — AMLODIPINE BESYLATE 5 MG/1
5 TABLET ORAL DAILY
Qty: 90 TABLET | Refills: 1 | Status: SHIPPED | OUTPATIENT
Start: 2023-10-05

## 2023-10-05 RX ORDER — HYDROCHLOROTHIAZIDE 25 MG/1
25 TABLET ORAL DAILY
Qty: 90 TABLET | Refills: 1 | Status: SHIPPED | OUTPATIENT
Start: 2023-10-05

## 2023-10-05 RX ORDER — METAXALONE 800 MG/1
800 TABLET ORAL NIGHTLY
Qty: 14 TABLET | Refills: 0 | Status: SHIPPED | OUTPATIENT
Start: 2023-10-05

## 2023-10-05 RX ORDER — LISINOPRIL 40 MG/1
40 TABLET ORAL DAILY
Qty: 90 TABLET | Refills: 1 | Status: SHIPPED | OUTPATIENT
Start: 2023-10-05

## 2023-10-08 NOTE — PROGRESS NOTES
Chief Complaint   Patient presents with    Back Pain       History of Present Illness    The patient presents for an acute visit for low back pain which began two weeks ago. She states that the pain was first noted to come on insidiously. The pain is not associated with fever, chills, weight loss, rashes, dysuria, hematuria or a decreased urine stream. The patient reports a past history of malignancy. She states that she has had breast cancer. The pain radiates to the right buttock and to the right thigh. There is no numbness. The patient denies loss of bladder control. The patient denies loss of bowel control. The patient has attempted stretches. The exercise and stretching did not give pain relief. The patient reports lifting and bending aggravates the symptoms.    Medications      Current Outpatient Medications:     amLODIPine (NORVASC) 5 MG tablet, Take 1 tablet by mouth Daily., Disp: 90 tablet, Rfl: 1    aspirin 81 MG tablet, Take  by mouth daily., Disp: , Rfl:     atenolol (TENORMIN) 100 MG tablet, Take 1 tablet by mouth Daily., Disp: 90 tablet, Rfl: 1    atorvastatin (LIPITOR) 20 MG tablet, Take 1 tablet by mouth Every Night., Disp: 90 tablet, Rfl: 1    buPROPion XL (Wellbutrin XL) 150 MG 24 hr tablet, Take 1 tablet by mouth Daily., Disp: 30 tablet, Rfl: 2    CILOXAN 0.3 % ophthalmic ointment, Administer 1 application to the right eye Daily., Disp: , Rfl:     dorzolamide-timolol (COSOPT) 22.3-6.8 MG/ML ophthalmic solution, Apply  to eye 2 (two) times a day., Disp: , Rfl:     glipizide (GLUCOTROL) 5 MG tablet, Take one tablet by mouth twice daily before food, Disp: 180 tablet, Rfl: 1    hydroCHLOROthiazide (HYDRODIURIL) 25 MG tablet, Take 1 tablet by mouth Daily., Disp: 90 tablet, Rfl: 1    insulin detemir (Levemir FlexTouch) 100 UNIT/ML injection, Inject 50 Units under the skin into the appropriate area as directed Every Night. (Patient taking differently: Inject 45 Units under the skin into the appropriate  area as directed Every Night.), Disp: 45 mL, Rfl: 1    Insulin Pen Needle 32G X 4 MM misc, Use daily with insulin, Disp: 100 each, Rfl: 3    Lancets misc, USE TO TEST ONCE DAILY AND AS NEEDED, Disp: 100 each, Rfl: 3    lisinopril (PRINIVIL,ZESTRIL) 40 MG tablet, Take 1 tablet by mouth Daily., Disp: 90 tablet, Rfl: 1    metFORMIN ER (GLUCOPHAGE-XR) 500 MG 24 hr tablet, Take 2 tablets by mouth 2 (Two) Times a Day., Disp: 360 tablet, Rfl: 1    metroNIDAZOLE (METROGEL) 1 % gel, Apply  topically Daily., Disp: 60 g, Rfl: 3    Multiple Vitamins-Minerals (ICAPS AREDS FORMULA PO), Take  by mouth 2 (two) times a day., Disp: , Rfl:     multivitamin (THERAGRAN) tablet tablet, Take  by mouth Daily., Disp: , Rfl:     nystatin-triamcinolone (MYCOLOG II) 250293-7.1 UNIT/GM-% cream, Apply 1 application  topically to the appropriate area as directed 2 (Two) Times a Day., Disp: , Rfl:     PARoxetine (PAXIL) 20 MG tablet, Take 1 tablet by mouth Every Morning., Disp: 90 tablet, Rfl: 1    SITagliptin (Januvia) 100 MG tablet, Take 1 tablet by mouth Daily., Disp: 90 tablet, Rfl: 0    True Metrix Blood Glucose Test test strip, Use to test blood glucose once daily and as needed, Disp: 100 each, Rfl: 3     Allergies    No Known Allergies    Problem List    Patient Active Problem List   Diagnosis    Abnormal mammogram    Anemia    Depression    Uncontrolled type 2 diabetes mellitus with hyperglycemia    Mixed hyperlipidemia    Hypertension    Anxiety    Vitamin D deficiency       Medications, Allergies, Problems List and Past History were reviewed and updated.    Physical Examination    /68 (BP Location: Right arm, Patient Position: Sitting, Cuff Size: Adult)   Pulse 72   Temp 98 °F (36.7 °C) (Infrared)   Resp 16   Wt 70.8 kg (156 lb)   LMP  (LMP Unknown) Comment: Last pap 1/2020 by JEREMIE Watson  BMI 29.48 kg/m²       Abdomen: Soft, benign, non-tender with no masses, hernias, organomegaly or scars.    Back: The patient has a  normal spinal curvature with no evidence of scoliosis. There are no skin lesions noted on the back. Palpation reveals no tenderness and normal muscle tone. The straight leg raising test is negative. The back has normal flexion, extension, rotation, and lateral bending.    Lower Extremity Neuro Exam: Muscle Strength is 5/5 in all major lower extremity muscle groups. Deep Tendon Reflexes are 2+ and equal in the patellar and Achilles distributions bilaterally. Sensation is normal in all distributions.    Impression and Assessment    Low Back Pain with Right Sided Sciatica.    Plan    Low Back Pain with Right Sided Sciatica Plan: Stretching of the affected area was encouraged. Weight loss was recommended. Medication will be added as noted.  Physical Therapy was scheduled.  Further plans will be made after testing.     Diagnoses and all orders for this visit:    1. Acute right-sided low back pain with right-sided sciatica (Primary)  -     XR Spine Lumbar 2 or 3 View; Future  -     POC Urinalysis Dipstick, Automated; Future  -     metaxalone (Skelaxin) 800 MG tablet; Take 1 tablet by mouth Every Night.  Dispense: 14 tablet; Refill: 0  -     Ambulatory Referral to Physical Therapy Evaluate and treat  -     POC Urinalysis Dipstick, Automated    Other orders  -     amLODIPine (NORVASC) 5 MG tablet; Take 1 tablet by mouth Daily.  Dispense: 90 tablet; Refill: 1  -     lisinopril (PRINIVIL,ZESTRIL) 40 MG tablet; Take 1 tablet by mouth Daily.  Dispense: 90 tablet; Refill: 1  -     PARoxetine (PAXIL) 20 MG tablet; Take 1 tablet by mouth Every Morning.  Dispense: 90 tablet; Refill: 1  -     hydroCHLOROthiazide (HYDRODIURIL) 25 MG tablet; Take 1 tablet by mouth Daily.  Dispense: 90 tablet; Refill: 1        Return to Office    The patient was instructed to return for follow-up at the next scheduled visit. The patient was instructed to return sooner if the condition changes, worsens, or does not resolve.

## 2023-11-14 DIAGNOSIS — E11.40 CONTROLLED TYPE 2 DIABETES WITH NEUROPATHY: Chronic | ICD-10-CM

## 2023-11-14 NOTE — TELEPHONE ENCOUNTER
Rx Refill Note  Requested Prescriptions     Pending Prescriptions Disp Refills    SITagliptin (Januvia) 100 MG tablet 90 tablet 0     Sig: Take 1 tablet by mouth Daily.      Last office visit with prescribing clinician: 9/11/2023     Next office visit with prescribing clinician: 1/18/2024

## 2023-11-14 NOTE — TELEPHONE ENCOUNTER
Caller: Zeinab Rodriguez    Relationship: Self    Best call back number: [phone]     Requested Prescriptions:   Requested Prescriptions     Pending Prescriptions Disp Refills    SITagliptin (Januvia) 100 MG tablet 90 tablet 0     Sig: Take 1 tablet by mouth Daily.        Pharmacy where request should be sent:  [address] [phone] [facility] [phone]     Last office visit with prescribing clinician: 9/11/2023   Next office visit with prescribing clinician: 1/18/2024     Additional details provided by patient: PATIENT WANTS TO CONTINUE HER PRESCRIPTION BUT THERE WERE NO REFILLS LEFT.    Does the patient have less than a 3 day supply:  [] Yes  [x] No    Would you like a call back once the refill request has been completed: [] Yes [x] No    If the office needs to give you a call back, can they leave a voicemail: [] Yes [x] No    Veronica Monk Rep   11/14/23 15:33 EST

## 2023-11-27 DIAGNOSIS — Z79.4 CONTROLLED TYPE 2 DIABETES MELLITUS WITH HYPOGLYCEMIA, WITH LONG-TERM CURRENT USE OF INSULIN: Chronic | ICD-10-CM

## 2023-11-27 DIAGNOSIS — E11.65 UNCONTROLLED TYPE 2 DIABETES MELLITUS WITH HYPERGLYCEMIA: ICD-10-CM

## 2023-11-27 DIAGNOSIS — E11.649 CONTROLLED TYPE 2 DIABETES MELLITUS WITH HYPOGLYCEMIA, WITH LONG-TERM CURRENT USE OF INSULIN: Chronic | ICD-10-CM

## 2023-11-27 RX ORDER — METFORMIN HYDROCHLORIDE 500 MG/1
1000 TABLET, EXTENDED RELEASE ORAL 2 TIMES DAILY
Qty: 360 TABLET | Refills: 0 | Status: SHIPPED | OUTPATIENT
Start: 2023-11-27

## 2023-11-27 RX ORDER — GLIPIZIDE 5 MG/1
TABLET ORAL
Qty: 180 TABLET | Refills: 0 | Status: SHIPPED | OUTPATIENT
Start: 2023-11-27

## 2023-11-27 NOTE — TELEPHONE ENCOUNTER
Rx Refill Note  Requested Prescriptions     Pending Prescriptions Disp Refills    metFORMIN ER (GLUCOPHAGE-XR) 500 MG 24 hr tablet 360 tablet 0     Sig: Take 2 tablets by mouth 2 (Two) Times a Day.    glipizide (GLUCOTROL) 5 MG tablet 180 tablet 0     Sig: Take one tablet by mouth twice daily before food        Last office visit with prescribing clinician: 9/11/23     Next office visit with prescribing clinician: 1/18/24      Ana Nguyen (Jodi)  11/27/23, 15:45 EST

## 2023-11-27 NOTE — TELEPHONE ENCOUNTER
Caller: Zeinab Rodriguez    Relationship: Self    Best call back number: [phone]     Requested Prescriptions:   Requested Prescriptions     Pending Prescriptions Disp Refills    metFORMIN ER (GLUCOPHAGE-XR) 500 MG 24 hr tablet 360 tablet 1     Sig: Take 2 tablets by mouth 2 (Two) Times a Day.    glipizide (GLUCOTROL) 5 MG tablet 180 tablet 1     Sig: Take one tablet by mouth twice daily before food        Pharmacy where request should be sent:  [address] [phone] [facility] [phone]      Last office visit with prescribing clinician: 2/22/2023   Last telemedicine visit with prescribing clinician: Visit date not found   Next office visit with prescribing clinician: Visit date not found     Additional details provided by patient: PT NEEDS THIS ASAP, IS OUT OF METFORMIN. WOULD LIKE A FEW REFILLS IF POSSIBLE SO SHE DIDN'T HAVE TO CALL EVERY TIME     Does the patient have less than a 3 day supply:  [x] Yes  [] No    Would you like a call back once the refill request has been completed: [x] Yes [] No    If the office needs to give you a call back, can they leave a voicemail: [x] Yes [] No    Veronica Stafford Rep   11/27/23 14:45 EST

## 2023-12-01 RX ORDER — ATENOLOL 100 MG/1
100 TABLET ORAL DAILY
Qty: 90 TABLET | Refills: 1 | Status: SHIPPED | OUTPATIENT
Start: 2023-12-01

## 2023-12-01 NOTE — TELEPHONE ENCOUNTER
Caller: Zeinab Rodriguez    Relationship: Self    Best call back number: [phone]     Requested Prescriptions:   Requested Prescriptions     Pending Prescriptions Disp Refills    atenolol (TENORMIN) 100 MG tablet 90 tablet 1     Sig: Take 1 tablet by mouth Daily.        Pharmacy where request should be sent: [address] - [phone] [facility] [phone] FX     Last office visit with prescribing clinician: 10/5/2023   Last telemedicine visit with prescribing clinician: Visit date not found   Next office visit with prescribing clinician: 12/21/2023     Additional details provided by patient:  NEW PRESCRIPTION NEEDED? 90 DAY SUPPLY    Does the patient have less than a 3 day supply:  [] Yes  [x] No    Would you like a call back once the refill request has been completed: [] Yes [x] No    If the office needs to give you a call back, can they leave a voicemail: [] Yes [x] No    Veronica German Rep   12/01/23 14:27 EST

## 2023-12-15 DIAGNOSIS — Z79.4 CONTROLLED TYPE 2 DIABETES MELLITUS WITH DIABETIC POLYNEUROPATHY, WITH LONG-TERM CURRENT USE OF INSULIN: Chronic | ICD-10-CM

## 2023-12-15 DIAGNOSIS — E11.42 CONTROLLED TYPE 2 DIABETES MELLITUS WITH DIABETIC POLYNEUROPATHY, WITH LONG-TERM CURRENT USE OF INSULIN: Chronic | ICD-10-CM

## 2023-12-15 RX ORDER — INSULIN DETEMIR 100 [IU]/ML
50 INJECTION, SOLUTION SUBCUTANEOUS NIGHTLY
Qty: 45 ML | Refills: 0 | Status: SHIPPED | OUTPATIENT
Start: 2023-12-15

## 2023-12-15 NOTE — TELEPHONE ENCOUNTER
Rx Refill Note  Requested Prescriptions      No prescriptions requested or ordered in this encounter        Last office visit with prescribing clinician: 9/11/23      Next office visit with prescribing clinician: 1/18/24      Ana Nguyen (Jodi)  12/15/23, 10:39 EST

## 2023-12-22 DIAGNOSIS — F32.9 REACTIVE DEPRESSION: ICD-10-CM

## 2023-12-22 RX ORDER — BUPROPION HYDROCHLORIDE 150 MG/1
150 TABLET ORAL DAILY
Qty: 30 TABLET | Refills: 2 | Status: SHIPPED | OUTPATIENT
Start: 2023-12-22

## 2023-12-22 NOTE — TELEPHONE ENCOUNTER
Caller: Zeinab Rodriguez    Relationship: Self    Best call back number: [phone]     Requested Prescriptions:   Requested Prescriptions     Pending Prescriptions Disp Refills    buPROPion XL (Wellbutrin XL) 150 MG 24 hr tablet 30 tablet 2     Sig: Take 1 tablet by mouth Daily.      Pharmacy where request should be sent: [address] [phone] [facility] [phone] FX     Last office visit with prescribing clinician: 10/5/2023   Last telemedicine visit with prescribing clinician: Visit date not found   Next office visit with prescribing clinician: 2/6/2024     Additional details provided by patient: THE PATIENT HAS HAD ISSUES WITH THE PHARMACY'S REQUEST GETTING TO THE OFFICE. SHE REQUESTED THIS LAST WEEK BUT NOW SHE IS OUT OF MEDICATION.     THIS NEEDS TO BE A 90 DAY SUPPLY FOR INSURANCE.    Does the patient have less than a 3 day supply:  [x] Yes  [] No    Would you like a call back once the refill request has been completed: [] Yes [x] No    If the office needs to give you a call back, can they leave a voicemail: [] Yes [x] No    Veronica Poole Rep   12/22/23 12:43 EST

## 2023-12-22 NOTE — TELEPHONE ENCOUNTER
Rx Refill Note  Requested Prescriptions     Pending Prescriptions Disp Refills    buPROPion XL (Wellbutrin XL) 150 MG 24 hr tablet 30 tablet 2     Sig: Take 1 tablet by mouth Daily.      Last office visit with prescribing clinician: 10/5/2023   Last telemedicine visit with prescribing clinician: Visit date not found   Next office visit with prescribing clinician: 2/6/2024                         Would you like a call back once the refill request has been completed: [] Yes [] No    If the office needs to give you a call back, can they leave a voicemail: [] Yes [] No    Janina Allan MA  12/22/23, 13:13 EST

## 2024-01-29 DIAGNOSIS — E11.9 TYPE 2 DIABETES MELLITUS WITHOUT COMPLICATION, WITHOUT LONG-TERM CURRENT USE OF INSULIN: ICD-10-CM

## 2024-01-29 DIAGNOSIS — E78.5 HYPERLIPIDEMIA, UNSPECIFIED HYPERLIPIDEMIA TYPE: ICD-10-CM

## 2024-01-29 RX ORDER — ATORVASTATIN CALCIUM 20 MG/1
20 TABLET, FILM COATED ORAL NIGHTLY
Qty: 90 TABLET | Refills: 1 | Status: SHIPPED | OUTPATIENT
Start: 2024-01-29

## 2024-01-29 NOTE — TELEPHONE ENCOUNTER
Caller: Zeinab Rodriguez ZUNILDA    Relationship: Self    Best call back number: [phone]     Requested Prescriptions:   Requested Prescriptions     Pending Prescriptions Disp Refills    atorvastatin (LIPITOR) 20 MG tablet 90 tablet 1     Sig: Take 1 tablet by mouth Every Night.        Pharmacy where request should be sent: [address] - [phone] [facility] [phone]      Last office visit with prescribing clinician: 10/5/2023   Last telemedicine visit with prescribing clinician: Visit date not found   Next office visit with prescribing clinician: 2/6/2024     Does the patient have less than a 3 day supply:  [x] Yes  [] No    Would you like a call back once the refill request has been completed: [] Yes [x] No    If the office needs to give you a call back, can they leave a voicemail: [] Yes [x] No    Veronica Tejada Rep   01/29/24 11:55 EST             
Quality 111:Pneumonia Vaccination Status For Older Adults: Pneumococcal vaccine administered on or after patient’s 60th birthday and before the end of the measurement period
Quality 110: Preventive Care And Screening: Influenza Immunization: Influenza Immunization Administered during Influenza season
Additional Notes: Received covid shots
Quality 431: Preventive Care And Screening: Unhealthy Alcohol Use - Screening: Patient not identified as an unhealthy alcohol user when screened for unhealthy alcohol use using a systematic screening method
Detail Level: Detailed

## 2024-02-06 ENCOUNTER — OFFICE VISIT (OUTPATIENT)
Dept: INTERNAL MEDICINE | Facility: CLINIC | Age: 69
End: 2024-02-06
Payer: MEDICARE

## 2024-02-06 VITALS
WEIGHT: 157 LBS | RESPIRATION RATE: 18 BRPM | HEART RATE: 72 BPM | BODY MASS INDEX: 29.66 KG/M2 | DIASTOLIC BLOOD PRESSURE: 64 MMHG | SYSTOLIC BLOOD PRESSURE: 136 MMHG | TEMPERATURE: 98.4 F

## 2024-02-06 DIAGNOSIS — Z23 IMMUNIZATION DUE: ICD-10-CM

## 2024-02-06 DIAGNOSIS — R21 RASH: ICD-10-CM

## 2024-02-06 DIAGNOSIS — E11.9 TYPE 2 DIABETES MELLITUS WITHOUT COMPLICATION, WITHOUT LONG-TERM CURRENT USE OF INSULIN: Primary | ICD-10-CM

## 2024-02-06 DIAGNOSIS — I10 ESSENTIAL HYPERTENSION: ICD-10-CM

## 2024-02-06 DIAGNOSIS — R82.998 LEUKOCYTES IN URINE: ICD-10-CM

## 2024-02-06 DIAGNOSIS — F32.9 REACTIVE DEPRESSION: ICD-10-CM

## 2024-02-06 DIAGNOSIS — E78.5 HYPERLIPIDEMIA, UNSPECIFIED HYPERLIPIDEMIA TYPE: ICD-10-CM

## 2024-02-06 LAB
ALBUMIN/CREATININE RATIO, URINE: <30
BILIRUB BLD-MCNC: NEGATIVE MG/DL
CLARITY, POC: ABNORMAL
COLOR UR: ABNORMAL
EXPIRATION DATE: ABNORMAL
EXPIRATION DATE: NORMAL
GLUCOSE UR STRIP-MCNC: ABNORMAL MG/DL
KETONES UR QL: NEGATIVE
LEUKOCYTE EST, POC: ABNORMAL
Lab: ABNORMAL
Lab: NORMAL
NITRITE UR-MCNC: NEGATIVE MG/ML
PH UR: 5 [PH] (ref 5–8)
POC CREATININE URINE: 200
POC MICROALBUMIN URINE: 30
PROT UR STRIP-MCNC: NEGATIVE MG/DL
RBC # UR STRIP: NEGATIVE /UL
SP GR UR: 1.02 (ref 1–1.03)
UROBILINOGEN UR QL: NORMAL

## 2024-02-06 PROCEDURE — 81003 URINALYSIS AUTO W/O SCOPE: CPT | Performed by: INTERNAL MEDICINE

## 2024-02-06 PROCEDURE — G0008 ADMIN INFLUENZA VIRUS VAC: HCPCS | Performed by: INTERNAL MEDICINE

## 2024-02-06 PROCEDURE — 3075F SYST BP GE 130 - 139MM HG: CPT | Performed by: INTERNAL MEDICINE

## 2024-02-06 PROCEDURE — 82044 UR ALBUMIN SEMIQUANTITATIVE: CPT | Performed by: INTERNAL MEDICINE

## 2024-02-06 PROCEDURE — 90662 IIV NO PRSV INCREASED AG IM: CPT | Performed by: INTERNAL MEDICINE

## 2024-02-06 PROCEDURE — 3078F DIAST BP <80 MM HG: CPT | Performed by: INTERNAL MEDICINE

## 2024-02-06 PROCEDURE — 87086 URINE CULTURE/COLONY COUNT: CPT | Performed by: INTERNAL MEDICINE

## 2024-02-06 PROCEDURE — 99214 OFFICE O/P EST MOD 30 MIN: CPT | Performed by: INTERNAL MEDICINE

## 2024-02-06 RX ORDER — TRIAMCINOLONE ACETONIDE 1 MG/ML
LOTION TOPICAL 3 TIMES DAILY
Qty: 120 ML | Refills: 1 | Status: SHIPPED | OUTPATIENT
Start: 2024-02-06

## 2024-02-06 RX ORDER — MUPIROCIN CALCIUM 20 MG/G
1 CREAM TOPICAL 2 TIMES DAILY
Qty: 60 G | Refills: 1 | Status: SHIPPED | OUTPATIENT
Start: 2024-02-06

## 2024-02-06 NOTE — PROGRESS NOTES
Chief Complaint   Patient presents with    Follow-up     6 month follow up chronic medical problems    Rash     Back and legs       History of Present Illness      The patient presents for a follow-up related to Type 2 Diabetes Mellitus. She checks her blood sugars at home. Her sugars are checked daily. The average sugars are in the 100-150 range. She denies hypoglycemic symptoms. The patient denies polyuria, polydypsia or polyphagia. The patient takes her medication as prescribed. She is taking insulin. Her injection sites are rotated appropriately. The patient does check her feet daily at home. She denies chest pain, shortness of breath, orthopnea, paroxysmal nocturnal dyspnea, dyspnea on exertion, edema, palpitations or syncope.    The patient presents for a follow-up related to hypertension. The patient reports that she has had no headaches or blurred vision. She states that she is taking her medication as prescribed. She is not having medication side effects.    The patient presents for a follow-up related to hyperlipidemia. She is following a low fat diet. She reports that she is exercising. She is taking atorvastatin. The patient is taking her medication as prescribed. She reports no medication side effects, including muscle cramps, abdominal pain, headaches or weakness.    The patient presents for a follow-up related to depression. She denies currently having depression symptoms. She denies suicidal ideation. Her energy level is good. She denies agorophobia. She sleeps well. She is not tearful. She states that her current depression symptoms are stable. She is currently taking a medication. The current medication regimen consists of Paxil and Wellbutrin XL. The patient reports side effect consisting of distractibility but denies nausea, headaches, anxiety, increased depression or fatigue.    Medications      Current Outpatient Medications:     amLODIPine (NORVASC) 5 MG tablet, Take 1 tablet by mouth Daily.,  Disp: 90 tablet, Rfl: 1    aspirin 81 MG tablet, Take  by mouth daily., Disp: , Rfl:     atenolol (TENORMIN) 100 MG tablet, Take 1 tablet by mouth Daily., Disp: 90 tablet, Rfl: 1    atorvastatin (LIPITOR) 20 MG tablet, Take 1 tablet by mouth Every Night., Disp: 90 tablet, Rfl: 1    buPROPion XL (Wellbutrin XL) 150 MG 24 hr tablet, Take 1 tablet by mouth Daily. (Patient taking differently: Take 1 tablet by mouth Daily. 2/6/24 Patient reports that she is taking it every other day), Disp: 30 tablet, Rfl: 2    CILOXAN 0.3 % ophthalmic ointment, Administer 1 application to the right eye Daily., Disp: , Rfl:     dorzolamide-timolol (COSOPT) 22.3-6.8 MG/ML ophthalmic solution, Apply  to eye 2 (two) times a day., Disp: , Rfl:     glipizide (GLUCOTROL) 5 MG tablet, Take one tablet by mouth twice daily before food, Disp: 180 tablet, Rfl: 0    hydroCHLOROthiazide (HYDRODIURIL) 25 MG tablet, Take 1 tablet by mouth Daily., Disp: 90 tablet, Rfl: 1    insulin detemir (Levemir FlexTouch) 100 UNIT/ML injection, Inject 50 Units under the skin into the appropriate area as directed Every Night., Disp: 45 mL, Rfl: 0    Insulin Pen Needle 32G X 4 MM misc, Use daily with insulin, Disp: 100 each, Rfl: 3    Lancets misc, USE TO TEST ONCE DAILY AND AS NEEDED, Disp: 100 each, Rfl: 3    lisinopril (PRINIVIL,ZESTRIL) 40 MG tablet, Take 1 tablet by mouth Daily., Disp: 90 tablet, Rfl: 1    metFORMIN ER (GLUCOPHAGE-XR) 500 MG 24 hr tablet, Take 2 tablets by mouth 2 (Two) Times a Day., Disp: 360 tablet, Rfl: 0    metroNIDAZOLE (METROGEL) 1 % gel, Apply  topically Daily., Disp: 60 g, Rfl: 3    Multiple Vitamins-Minerals (ICAPS AREDS FORMULA PO), Take  by mouth 2 (two) times a day., Disp: , Rfl:     multivitamin (THERAGRAN) tablet tablet, Take  by mouth Daily., Disp: , Rfl:     nystatin-triamcinolone (MYCOLOG II) 879737-2.1 UNIT/GM-% cream, Apply 1 Application topically to the appropriate area as directed 2 (Two) Times a Day., Disp: , Rfl:      PARoxetine (PAXIL) 20 MG tablet, Take 1 tablet by mouth Every Morning., Disp: 90 tablet, Rfl: 1    SITagliptin (Januvia) 100 MG tablet, Take 1 tablet by mouth Daily., Disp: 90 tablet, Rfl: 0    True Metrix Blood Glucose Test test strip, Use to test blood glucose once daily and as needed, Disp: 100 each, Rfl: 3    metaxalone (Skelaxin) 800 MG tablet, Take 1 tablet by mouth Every Night. (Patient not taking: Reported on 2/6/2024), Disp: 14 tablet, Rfl: 0       Allergies    No Known Allergies    Problem List    Patient Active Problem List   Diagnosis    Abnormal mammogram    Anemia    Depression    Uncontrolled type 2 diabetes mellitus with hyperglycemia    Mixed hyperlipidemia    Hypertension    Anxiety    Vitamin D deficiency       Medications, Allergies, Problems List and Past History were reviewed and updated.    Physical Examination    /64 (BP Location: Right arm, Patient Position: Sitting, Cuff Size: Adult)   Pulse 72   Temp 98.4 °F (36.9 °C) (Infrared)   Resp 18   Wt 71.2 kg (157 lb)   LMP  (LMP Unknown) Comment: Last pap 1/2020 by JEREMIE Watson  BMI 29.66 kg/m²     HEENT: Facies- Within normal limits. Lids- Normal bilaterally. Conjunctiva- Clear bilaterally. Sclera- Anicteric bilaterally.    Neck: Thyroid- non enlarged, symmetric and has no nodules. No bruits are detected.    Lungs: Auscultation- Clear to auscultation bilaterally. There are no retractions, clubbing or cyanosis. The Expiratory to Inspiratory ratio is equal.    Cardiovascular: There are no carotid bruits. Heart- Normal Rate with Regular rhythm and no murmurs. There are no gallops. There are no rubs. In the lower extremities there is no edema. The upper extremities do not have edema.    Abdomen: Soft, benign, non-tender with no masses, hernias, organomegaly or scars.    Impression and Assessment    Encounter for Immunization Administration.    Type 2 Diabetes Mellitus without complication with insulin usage.    Essential  Hypertension.    Hyperlipidemia.    Reactive Depression.    Plan    Essential Hypertension Plan: The patient was instructed to continue the current medications.    Hyperlipidemia Plan: The patient was instructed to exercise daily, eat a low fat diet and continue her medications.    Type 2 Diabetes Mellitus without complication with insulin usage Plan: The patient was instructed to continue the current medications.    Reactive Depression Plan: The medications were adjusted as noted.    Counseled regarding immunizations and applicable VIS given.    Immunizations Ordered and Administered: Fluzone High Dose 65+ years.    Diagnoses and all orders for this visit:    1. Type 2 diabetes mellitus without complication, without long-term current use of insulin (Primary)  -     Comprehensive Metabolic Panel; Future  -     Hemoglobin A1c; Future  -     POC Microalbumin; Future    2. Hyperlipidemia, unspecified hyperlipidemia type  -     Comprehensive Metabolic Panel; Future  -     Lipid Panel; Future    3. Reactive depression    4. Essential hypertension  -     CBC & Differential; Future  -     Comprehensive Metabolic Panel; Future  -     POC Urinalysis Dipstick, Automated; Future    5. Rash  -     triamcinolone (KENALOG) 0.1 % lotion; Apply  topically to the appropriate area as directed 3 (Three) Times a Day.  Dispense: 120 mL; Refill: 1  -     mupirocin (BACTROBAN) 2 % cream; Apply 1 Application topically to the appropriate area as directed 2 (Two) Times a Day.  Dispense: 60 g; Refill: 1    6. Immunization due  -     Fluzone High-Dose 65+yrs        Return to Office    The patient was instructed to return for follow-up in 4 months. The patient was instructed to return sooner if the condition changes, worsens, or does not resolve.        BMI is >= 25 and <30. (Overweight) The following options were offered after discussion;: weight loss educational material (shared in after visit summary), exercise counseling/recommendations,  nutrition counseling/recommendations, and Information on healthy weight added to patient's after visit summary.

## 2024-02-07 LAB
ALBUMIN SERPL-MCNC: 4.1 G/DL (ref 3.5–5.2)
ALBUMIN/GLOB SERPL: 1.9 G/DL
ALP SERPL-CCNC: 85 U/L (ref 39–117)
ALT SERPL-CCNC: 19 U/L (ref 1–33)
AST SERPL-CCNC: 22 U/L (ref 1–32)
BASOPHILS # BLD AUTO: 0.04 10*3/MM3 (ref 0–0.2)
BASOPHILS NFR BLD AUTO: 0.9 % (ref 0–1.5)
BILIRUB SERPL-MCNC: 0.4 MG/DL (ref 0–1.2)
BUN SERPL-MCNC: 12 MG/DL (ref 8–23)
BUN/CREAT SERPL: 16.9 (ref 7–25)
CALCIUM SERPL-MCNC: 9 MG/DL (ref 8.6–10.5)
CHLORIDE SERPL-SCNC: 99 MMOL/L (ref 98–107)
CHOLEST SERPL-MCNC: 117 MG/DL (ref 0–200)
CO2 SERPL-SCNC: 36.8 MMOL/L (ref 22–29)
CREAT SERPL-MCNC: 0.71 MG/DL (ref 0.57–1)
EGFRCR SERPLBLD CKD-EPI 2021: 92.2 ML/MIN/1.73
EOSINOPHIL # BLD AUTO: 0.14 10*3/MM3 (ref 0–0.4)
EOSINOPHIL NFR BLD AUTO: 3.2 % (ref 0.3–6.2)
ERYTHROCYTE [DISTWIDTH] IN BLOOD BY AUTOMATED COUNT: 15.3 % (ref 12.3–15.4)
GLOBULIN SER CALC-MCNC: 2.2 GM/DL
GLUCOSE SERPL-MCNC: 255 MG/DL (ref 65–99)
HBA1C MFR BLD: 8.5 % (ref 4.8–5.6)
HCT VFR BLD AUTO: 37 % (ref 34–46.6)
HDLC SERPL-MCNC: 38 MG/DL (ref 40–60)
HGB BLD-MCNC: 11.5 G/DL (ref 12–15.9)
IMM GRANULOCYTES # BLD AUTO: 0.02 10*3/MM3 (ref 0–0.05)
IMM GRANULOCYTES NFR BLD AUTO: 0.5 % (ref 0–0.5)
LDLC SERPL CALC-MCNC: 45 MG/DL (ref 0–100)
LYMPHOCYTES # BLD AUTO: 1.21 10*3/MM3 (ref 0.7–3.1)
LYMPHOCYTES NFR BLD AUTO: 27.9 % (ref 19.6–45.3)
MCH RBC QN AUTO: 24.8 PG (ref 26.6–33)
MCHC RBC AUTO-ENTMCNC: 31.1 G/DL (ref 31.5–35.7)
MCV RBC AUTO: 79.9 FL (ref 79–97)
MONOCYTES # BLD AUTO: 0.49 10*3/MM3 (ref 0.1–0.9)
MONOCYTES NFR BLD AUTO: 11.3 % (ref 5–12)
NEUTROPHILS # BLD AUTO: 2.44 10*3/MM3 (ref 1.7–7)
NEUTROPHILS NFR BLD AUTO: 56.2 % (ref 42.7–76)
NRBC BLD AUTO-RTO: 0 /100 WBC (ref 0–0.2)
PLATELET # BLD AUTO: 134 10*3/MM3 (ref 140–450)
POTASSIUM SERPL-SCNC: 3.8 MMOL/L (ref 3.5–5.2)
PROT SERPL-MCNC: 6.3 G/DL (ref 6–8.5)
RBC # BLD AUTO: 4.63 10*6/MM3 (ref 3.77–5.28)
SODIUM SERPL-SCNC: 139 MMOL/L (ref 136–145)
TRIGL SERPL-MCNC: 210 MG/DL (ref 0–150)
VLDLC SERPL CALC-MCNC: 34 MG/DL (ref 5–40)
WBC # BLD AUTO: 4.34 10*3/MM3 (ref 3.4–10.8)

## 2024-02-09 LAB
BACTERIA UR CULT: NORMAL
BACTERIA UR CULT: NORMAL

## 2024-02-11 DIAGNOSIS — D64.9 ANEMIA, UNSPECIFIED TYPE: Primary | ICD-10-CM

## 2024-02-22 DIAGNOSIS — E11.40 CONTROLLED TYPE 2 DIABETES WITH NEUROPATHY: Chronic | ICD-10-CM

## 2024-02-22 DIAGNOSIS — E11.649 CONTROLLED TYPE 2 DIABETES MELLITUS WITH HYPOGLYCEMIA, WITH LONG-TERM CURRENT USE OF INSULIN: Chronic | ICD-10-CM

## 2024-02-22 DIAGNOSIS — Z79.4 CONTROLLED TYPE 2 DIABETES MELLITUS WITH HYPOGLYCEMIA, WITH LONG-TERM CURRENT USE OF INSULIN: Chronic | ICD-10-CM

## 2024-02-22 NOTE — TELEPHONE ENCOUNTER
Caller: WakirstieZeinab    Relationship: Self    Best call back number: [phone]    Requested Prescriptions:     SITagliptin (Januvia) 100 MG tablet  100 mg, Daily        glipizide (GLUCOTROL) 5 MG tablet       Pharmacy where request should be sent:  [address] - [phone]  - [phone]  [phone]     Last office visit with prescribing clinician: 9/11/2023   Last telemedicine visit with prescribing clinician: Visit date not found   Next office visit with prescribing clinician: Visit date not found     Additional details provided by patient: 90 DAY SUPPLY     Does the patient have less than a 3 day supply:  [x] Yes  [] No    Would you like a call back once the refill request has been completed: [] Yes [] No    If the office needs to give you a call back, can they leave a voicemail: [] Yes [] No    Veronica Ugarte Rep   02/22/24 15:20 EST

## 2024-02-23 RX ORDER — GLIPIZIDE 5 MG/1
TABLET ORAL
Qty: 180 TABLET | Refills: 0 | Status: SHIPPED | OUTPATIENT
Start: 2024-02-23

## 2024-02-23 NOTE — TELEPHONE ENCOUNTER
Rx Refill Note  Requested Prescriptions      No prescriptions requested or ordered in this encounter        Last office visit with prescribing clinician: 9/11/2023      Next office visit with prescribing clinician: 5/2/2024       Ana Nguyen (Jodi)  02/23/24, 10:56 EST

## 2024-03-04 DIAGNOSIS — E11.65 UNCONTROLLED TYPE 2 DIABETES MELLITUS WITH HYPERGLYCEMIA: ICD-10-CM

## 2024-03-04 RX ORDER — METFORMIN HYDROCHLORIDE 500 MG/1
1000 TABLET, EXTENDED RELEASE ORAL 2 TIMES DAILY
Qty: 360 TABLET | Refills: 0 | Status: SHIPPED | OUTPATIENT
Start: 2024-03-04

## 2024-03-04 NOTE — TELEPHONE ENCOUNTER
Caller: JenniferZeinab    Relationship: Self    Best call back number:     [phone]        Requested Prescriptions:     metFORMIN ER (GLUCOPHAGE-XR) 500 MG 24 hr tablet  1,000 mg, 2 Times Daily        Pharmacy where request should be sent:  [facility] [address] - [phone]  - [phone]  [phone]     Last office visit with prescribing clinician: 9/11/2023   Last telemedicine visit with prescribing clinician: Visit date not found   Next office visit with prescribing clinician: 5/2/2024     Additional details provided by patient: WOULD LIKE A 90 DAY SUPPLY     Does the patient have less than a 3 day supply:  [] Yes  [x] No    Would you like a call back once the refill request has been completed: [] Yes [] No    If the office needs to give you a call back, can they leave a voicemail: [] Yes [] No    Veronica Ugarte Rep   03/04/24 10:15 EST

## 2024-03-15 ENCOUNTER — TELEPHONE (OUTPATIENT)
Dept: ENDOCRINOLOGY | Facility: CLINIC | Age: 69
End: 2024-03-15
Payer: MEDICARE

## 2024-03-15 NOTE — TELEPHONE ENCOUNTER
PT CALLED STATING SHE RECEIVED A LETTER STATING LEVEMIR WILL NO LONGER BE COVERED. SHE STATED SHE CANNOT READ THE SECTION OF LETTER STATING WHAT MEDS ARE PREFERRED. SHE REQUESTED WE LOOK INTO THIS AND REACH OUT TO HER.     PT STATED SHE USES [facility] IN [place], [place].

## 2024-03-18 RX ORDER — INSULIN DEGLUDEC 100 U/ML
50 INJECTION, SOLUTION SUBCUTANEOUS DAILY
Qty: 45 ML | Refills: 0 | Status: SHIPPED | OUTPATIENT
Start: 2024-03-18

## 2024-03-18 NOTE — TELEPHONE ENCOUNTER
Please let her know I sent in a prescription for Tresiba pens-- she will take the same dose she has been taking of Levemir when she switches to the new insulin.  Based on our records it looks like this should be covered.  Thanks RT

## 2024-03-25 RX ORDER — PAROXETINE HYDROCHLORIDE 20 MG/1
20 TABLET, FILM COATED ORAL EVERY MORNING
Qty: 90 TABLET | Refills: 1 | Status: SHIPPED | OUTPATIENT
Start: 2024-03-25

## 2024-03-25 RX ORDER — HYDROCHLOROTHIAZIDE 25 MG/1
25 TABLET ORAL DAILY
Qty: 90 TABLET | Refills: 1 | Status: SHIPPED | OUTPATIENT
Start: 2024-03-25

## 2024-03-25 RX ORDER — AMLODIPINE BESYLATE 5 MG/1
5 TABLET ORAL DAILY
Qty: 90 TABLET | Refills: 1 | Status: SHIPPED | OUTPATIENT
Start: 2024-03-25

## 2024-03-25 RX ORDER — LISINOPRIL 40 MG/1
40 TABLET ORAL DAILY
Qty: 90 TABLET | Refills: 1 | Status: SHIPPED | OUTPATIENT
Start: 2024-03-25

## 2024-03-25 NOTE — TELEPHONE ENCOUNTER
Caller: Zeinab Rodriguez    Relationship: Self    Best call back number: [phone]     Requested Prescriptions:   Requested Prescriptions     Pending Prescriptions Disp Refills    lisinopril (PRINIVIL,ZESTRIL) 40 MG tablet 90 tablet 1     Sig: Take 1 tablet by mouth Daily.    amLODIPine (NORVASC) 5 MG tablet 90 tablet 1     Sig: Take 1 tablet by mouth Daily.    hydroCHLOROthiazide 25 MG tablet 90 tablet 1     Sig: Take 1 tablet by mouth Daily.    PARoxetine (PAXIL) 20 MG tablet 90 tablet 1     Sig: Take 1 tablet by mouth Every Morning.        Pharmacy where request should be sent: [address] [phone] [facility] [phone]      Last office visit with prescribing clinician: 2/6/2024   Last telemedicine visit with prescribing clinician: Visit date not found   Next office visit with prescribing clinician: 6/6/2024     Additional details provided by patient: PATIENT WOULD LIKE A 90 DAY SUPPLY.     Does the patient have less than a 3 day supply:  [] Yes  [x] No    Would you like a call back once the refill request has been completed: [] Yes [x] No    If the office needs to give you a call back, can they leave a voicemail: [] Yes [x] No    Cadance Dunaway, RegSched Rep   03/25/24 13:19 EDT

## 2024-05-02 ENCOUNTER — OFFICE VISIT (OUTPATIENT)
Dept: ENDOCRINOLOGY | Facility: CLINIC | Age: 69
End: 2024-05-02
Payer: MEDICARE

## 2024-05-02 VITALS
OXYGEN SATURATION: 92 % | SYSTOLIC BLOOD PRESSURE: 152 MMHG | BODY MASS INDEX: 29.29 KG/M2 | WEIGHT: 155 LBS | DIASTOLIC BLOOD PRESSURE: 66 MMHG | HEART RATE: 75 BPM

## 2024-05-02 DIAGNOSIS — E11.65 UNCONTROLLED TYPE 2 DIABETES MELLITUS WITH HYPERGLYCEMIA: Primary | ICD-10-CM

## 2024-05-02 DIAGNOSIS — I10 PRIMARY HYPERTENSION: ICD-10-CM

## 2024-05-02 LAB
EXPIRATION DATE: ABNORMAL
EXPIRATION DATE: ABNORMAL
GLUCOSE BLDC GLUCOMTR-MCNC: 238 MG/DL (ref 70–130)
HBA1C MFR BLD: 8.2 % (ref 4.5–5.7)
Lab: ABNORMAL
Lab: ABNORMAL

## 2024-05-02 RX ORDER — NETARSUDIL AND LATANOPROST OPHTHALMIC SOLUTION, 0.02%/0.005% .2; .05 MG/ML; MG/ML
SOLUTION/ DROPS OPHTHALMIC; TOPICAL
COMMUNITY
Start: 2024-04-04

## 2024-05-02 RX ORDER — PILOCARPINE HYDROCHLORIDE 40 MG/ML
SOLUTION/ DROPS OPHTHALMIC
COMMUNITY
Start: 2024-01-16

## 2024-05-02 NOTE — PROGRESS NOTES
Office Note      Date: 2024  Patient Name: Zeinab Rodriguez  MRN: [id number]  : 1955    Chief Complaint   Patient presents with    Type 2 diabetes mellitus with hyperglycemia, with long-term     Follow-up       History of Present Illness:   Zeinab Rodriguez is a 68 y.o. female who presents for follow-up for type 2 diabetes diagnosed in .  She remains on metformin extended release 500 mg 2 tablets twice a day, glipizide 5 mg twice a day, Januvia 100 mg daily and Tresiba 45 units daily.  She switched over to the Tresiba since last visit and has not had any trouble on this insulin.  She was previously on Jardiance but is no longer taking this due to cost.  She reports overall her blood sugars have been a little high.  She denies any severe or frequent hypoglycemia.  She is up-to-date on her eye exam she goes every 3 to 4 months.  She denies any trouble with her feet today.  She had fasting labs including urine microalbumin/creatinine ratio in February.  She went to [place] in April to visit her son    Subjective     Review of Systems:   Review of Systems   Constitutional: Negative.    Cardiovascular: Negative.    Gastrointestinal: Negative.    Endocrine: Negative.    Neurological: Negative.        The following portions of the patient's history were reviewed and updated as appropriate: allergies, current medications, past family history, past medical history, past social history, past surgical history, and problem list.    Objective     Vitals:    24 1423   BP: 152/66   BP Location: Right arm   Patient Position: Sitting   Cuff Size: Adult   Pulse: 75   SpO2: 92%   Weight: 70.3 kg (155 lb)     Body mass index is 29.29 kg/m².    Physical Exam  Vitals reviewed.   Constitutional:       General: She is not in acute distress.     Appearance: Normal appearance.   Cardiovascular:      Pulses:           Dorsalis pedis pulses are 2+ on the right side and 2+ on the left side.        Posterior  tibial pulses are 2+ on the right side.   Musculoskeletal:      Right foot: No deformity.      Left foot: No deformity.   Feet:      Right foot:      Protective Sensation: 5 sites tested.  5 sites sensed.      Skin integrity: Skin integrity normal. Dry skin present.      Toenail Condition: Fungal disease present.     Left foot:      Protective Sensation: 5 sites tested.  5 sites sensed.      Skin integrity: Skin integrity normal. Dry skin present.      Toenail Condition: Fungal disease present.     Comments: Diabetic Foot Exam Performed and Monofilament Test Performed      Neurological:      Mental Status: She is alert.         HEMOGLOBIN A1C  Lab Results   Component Value Date    HGBA1C 8.2 (A) 05/02/2024       GLUCOSE  Glucose   Date Value Ref Range Status   05/02/2024 238 (A) 70 - 130 mg/dL Final       CMP  Lab Results   Component Value Date    GLUCOSE 255 (H) 02/06/2024    BUN 12 02/06/2024    CREATININE 0.71 02/06/2024    EGFRIFNONA 82 11/08/2021    EGFRIFAFRI 100 11/08/2021    BCR 16.9 02/06/2024    K 3.8 02/06/2024    CO2 36.8 (H) 02/06/2024    CALCIUM 9.0 02/06/2024    PROTENTOTREF 6.3 02/06/2024    LABIL2 1.9 02/06/2024    AST 22 02/06/2024    ALT 19 02/06/2024       LIPID PANEL  Lab Results   Component Value Date    CHOL 194 09/15/2016    CHLPL 117 02/06/2024    TRIG 210 (H) 02/06/2024    HDL 38 (L) 02/06/2024    LDL 45 02/06/2024       TSH  Lab Results   Component Value Date    TSH 1.170 06/21/2023       Assessment / Plan      Assessment & Plan:  1. Uncontrolled type 2 diabetes mellitus with hyperglycemia  Her hemoglobin A1c today is 8.2%.  This is slightly better than it was in February but still above goal.  For now we will try increasing her Tresiba to 47 units daily.  She will continue Januvia 100 mg daily, glipizide 5 mg twice a day and metformin extended release 500 mg 2 tablets twice a day.  I encouraged healthy eating habits and physical activity as tolerated.    - POC Glycosylated Hemoglobin (Hb  A1C)  - POC Glucose, Blood    2. Primary hypertension  Her systolic blood pressure is up some today.  It has been okay at other visits.  She will check this outside the office and reach out to me if this remains elevated.  For now we will continue to monitor this and she will continue her current medications.      Return in about 4 months (around 9/2/2024) for Recheck, usually sees Rosario.     This note was dictated using Dragon voice recognition.    Electronically signed by: PARDEEP Porter  05/02/2024

## 2024-05-20 DIAGNOSIS — E11.40 CONTROLLED TYPE 2 DIABETES WITH NEUROPATHY: Chronic | ICD-10-CM

## 2024-05-20 RX ORDER — SITAGLIPTIN 100 MG/1
TABLET, FILM COATED ORAL
Qty: 90 TABLET | Refills: 1 | Status: SHIPPED | OUTPATIENT
Start: 2024-05-20

## 2024-05-20 NOTE — TELEPHONE ENCOUNTER
Rx Refill Note  Requested Prescriptions     Pending Prescriptions Disp Refills    Januvia 100 MG tablet [Pharmacy Med Name: JANUVIA 100 MG TABLET] 30 tablet 0     Sig: TAKE 1 TABLET ONCE DAILY FOR DIABETES          Last office visit with prescribing clinician: 5/2/2024     Next office visit with prescribing clinician: 9/10/2024         Ailyn Eaton MA  05/20/24, 13:53 EDT

## 2024-06-06 ENCOUNTER — OFFICE VISIT (OUTPATIENT)
Dept: INTERNAL MEDICINE | Facility: CLINIC | Age: 69
End: 2024-06-06
Payer: MEDICARE

## 2024-06-06 VITALS
HEART RATE: 68 BPM | BODY MASS INDEX: 28.91 KG/M2 | DIASTOLIC BLOOD PRESSURE: 60 MMHG | RESPIRATION RATE: 18 BRPM | SYSTOLIC BLOOD PRESSURE: 126 MMHG | TEMPERATURE: 98.2 F | WEIGHT: 153 LBS

## 2024-06-06 DIAGNOSIS — I10 ESSENTIAL HYPERTENSION: ICD-10-CM

## 2024-06-06 DIAGNOSIS — F32.9 REACTIVE DEPRESSION: ICD-10-CM

## 2024-06-06 DIAGNOSIS — E78.5 HYPERLIPIDEMIA, UNSPECIFIED HYPERLIPIDEMIA TYPE: ICD-10-CM

## 2024-06-06 DIAGNOSIS — E11.9 TYPE 2 DIABETES MELLITUS WITHOUT COMPLICATION, WITHOUT LONG-TERM CURRENT USE OF INSULIN: ICD-10-CM

## 2024-06-06 DIAGNOSIS — Z00.00 PHYSICAL EXAM: ICD-10-CM

## 2024-06-06 DIAGNOSIS — Z12.11 SCREENING FOR COLON CANCER: ICD-10-CM

## 2024-06-06 DIAGNOSIS — D64.9 ANEMIA, UNSPECIFIED TYPE: ICD-10-CM

## 2024-06-06 DIAGNOSIS — Z00.00 MEDICARE ANNUAL WELLNESS VISIT, SUBSEQUENT: Primary | ICD-10-CM

## 2024-06-06 LAB
BILIRUB BLD-MCNC: NEGATIVE MG/DL
CLARITY, POC: CLEAR
COLOR UR: YELLOW
EXPIRATION DATE: ABNORMAL
GLUCOSE UR STRIP-MCNC: NEGATIVE MG/DL
KETONES UR QL: NEGATIVE
LEUKOCYTE EST, POC: ABNORMAL
Lab: ABNORMAL
NITRITE UR-MCNC: NEGATIVE MG/ML
PH UR: 5 [PH] (ref 5–8)
PROT UR STRIP-MCNC: NEGATIVE MG/DL
RBC # UR STRIP: NEGATIVE /UL
SP GR UR: 1.01 (ref 1–1.03)
UROBILINOGEN UR QL: NORMAL

## 2024-06-06 PROCEDURE — 96160 PT-FOCUSED HLTH RISK ASSMT: CPT | Performed by: INTERNAL MEDICINE

## 2024-06-06 PROCEDURE — 3078F DIAST BP <80 MM HG: CPT | Performed by: INTERNAL MEDICINE

## 2024-06-06 PROCEDURE — 1160F RVW MEDS BY RX/DR IN RCRD: CPT | Performed by: INTERNAL MEDICINE

## 2024-06-06 PROCEDURE — 82784 ASSAY IGA/IGD/IGG/IGM EACH: CPT | Performed by: INTERNAL MEDICINE

## 2024-06-06 PROCEDURE — 80053 COMPREHEN METABOLIC PANEL: CPT | Performed by: INTERNAL MEDICINE

## 2024-06-06 PROCEDURE — 1170F FXNL STATUS ASSESSED: CPT | Performed by: INTERNAL MEDICINE

## 2024-06-06 PROCEDURE — 1125F AMNT PAIN NOTED PAIN PRSNT: CPT | Performed by: INTERNAL MEDICINE

## 2024-06-06 PROCEDURE — 81003 URINALYSIS AUTO W/O SCOPE: CPT | Performed by: INTERNAL MEDICINE

## 2024-06-06 PROCEDURE — 86334 IMMUNOFIX E-PHORESIS SERUM: CPT | Performed by: INTERNAL MEDICINE

## 2024-06-06 PROCEDURE — 83036 HEMOGLOBIN GLYCOSYLATED A1C: CPT | Performed by: INTERNAL MEDICINE

## 2024-06-06 PROCEDURE — 3074F SYST BP LT 130 MM HG: CPT | Performed by: INTERNAL MEDICINE

## 2024-06-06 PROCEDURE — 82570 ASSAY OF URINE CREATININE: CPT | Performed by: INTERNAL MEDICINE

## 2024-06-06 PROCEDURE — 3052F HG A1C>EQUAL 8.0%<EQUAL 9.0%: CPT | Performed by: INTERNAL MEDICINE

## 2024-06-06 PROCEDURE — 82043 UR ALBUMIN QUANTITATIVE: CPT | Performed by: INTERNAL MEDICINE

## 2024-06-06 PROCEDURE — 36415 COLL VENOUS BLD VENIPUNCTURE: CPT | Performed by: INTERNAL MEDICINE

## 2024-06-06 PROCEDURE — 80061 LIPID PANEL: CPT | Performed by: INTERNAL MEDICINE

## 2024-06-06 PROCEDURE — 1159F MED LIST DOCD IN RCRD: CPT | Performed by: INTERNAL MEDICINE

## 2024-06-06 PROCEDURE — 84165 PROTEIN E-PHORESIS SERUM: CPT | Performed by: INTERNAL MEDICINE

## 2024-06-06 PROCEDURE — G0439 PPPS, SUBSEQ VISIT: HCPCS | Performed by: INTERNAL MEDICINE

## 2024-06-06 RX ORDER — PREDNISOLONE ACETATE 10 MG/ML
1 SUSPENSION/ DROPS OPHTHALMIC 4 TIMES DAILY
COMMUNITY
Start: 2024-05-16

## 2024-06-06 RX ORDER — ATROPINE SULFATE 10 MG/ML
1 SOLUTION/ DROPS OPHTHALMIC 2 TIMES DAILY
COMMUNITY
Start: 2024-05-28

## 2024-06-06 RX ORDER — ACETAZOLAMIDE 500 MG/1
500 CAPSULE, EXTENDED RELEASE ORAL DAILY
COMMUNITY
Start: 2024-05-13

## 2024-06-06 NOTE — PROGRESS NOTES
The ABCs of the Annual Wellness Visit  Subsequent Medicare Wellness Visit    Subjective      Zeinab Rodriguez is a 68 y.o. female who presents for a Subsequent Medicare Wellness Visit.    The following portions of the patient's history were reviewed and   updated as appropriate: allergies, current medications, past family history, past medical history, past social history, past surgical history, and problem list.    Compared to one year ago, the patient feels her physical   health is the same.    Compared to one year ago, the patient feels her mental   health is the same.    Recent Hospitalizations:  She was not admitted to the hospital during the last year.       Current Medical Providers:  Patient Care Team:  Janes Almazan MD as PCP - General (Internal Medicine)  Elin Arredondo MD as Surgeon (General Surgery)  Karina Brown MD (Radiation Oncology)  Steven Akbar MD as Consulting Physician (Ophthalmology)  Elin Tapia APRN as Nurse Practitioner (Nurse Practitioner)  Rosario Claudio PA-C as Physician Assistant (Endocrinology)    Outpatient Medications Prior to Visit   Medication Sig Dispense Refill    acetaZOLAMIDE (DIAMOX) 500 MG capsule Take 1 capsule by mouth Daily.      amLODIPine (NORVASC) 5 MG tablet Take 1 tablet by mouth Daily. 90 tablet 1    atenolol (TENORMIN) 100 MG tablet Take 1 tablet by mouth Daily. 90 tablet 1    atorvastatin (LIPITOR) 20 MG tablet Take 1 tablet by mouth Every Night. 90 tablet 1    atropine 1 % ophthalmic solution Administer 1 drop into the left eye 2 (Two) Times a Day.      CILOXAN 0.3 % ophthalmic ointment Administer 1 application to the right eye Daily.      dorzolamide-timolol (COSOPT) 22.3-6.8 MG/ML ophthalmic solution Apply  to eye 2 (two) times a day.      glipizide (GLUCOTROL) 5 MG tablet Take one tablet by mouth twice daily before food 180 tablet 0    hydroCHLOROthiazide 25 MG tablet Take 1 tablet by mouth Daily. 90 tablet 1    insulin degludec (Tresiba  FlexTouch) 100 UNIT/ML solution pen-injector injection Inject 50 Units under the skin into the appropriate area as directed Daily. TO REPLACE LEVEMIR (Patient taking differently: Inject 47 Units under the skin into the appropriate area as directed Daily. TO REPLACE LEVEMIR) 45 mL 0    Insulin Pen Needle 32G X 4 MM misc Use daily with insulin 100 each 3    Lancets misc USE TO TEST ONCE DAILY AND AS NEEDED 100 each 3    lisinopril (PRINIVIL,ZESTRIL) 40 MG tablet Take 1 tablet by mouth Daily. 90 tablet 1    metFORMIN ER (GLUCOPHAGE-XR) 500 MG 24 hr tablet Take 2 tablets by mouth 2 (Two) Times a Day. 360 tablet 0    metroNIDAZOLE (METROGEL) 1 % gel Apply  topically Daily. 60 g 3    Multiple Vitamins-Minerals (ICAPS AREDS FORMULA PO) Take  by mouth 2 (two) times a day.      multivitamin (THERAGRAN) tablet tablet Take  by mouth Daily.      PARoxetine (PAXIL) 20 MG tablet Take 1 tablet by mouth Every Morning. 90 tablet 1    prednisoLONE acetate (PRED FORTE) 1 % ophthalmic suspension Administer 1 drop into the left eye 4 (Four) Times a Day.      Rocklatan 0.02-0.005 % solution       SITagliptin (Januvia) 100 MG tablet TAKE 1 TABLET ONCE DAILY FOR DIABETES 90 tablet 1    triamcinolone (KENALOG) 0.1 % lotion Apply  topically to the appropriate area as directed 3 (Three) Times a Day. 120 mL 1    True Metrix Blood Glucose Test test strip Use to test blood glucose once daily and as needed 100 each 3    aspirin 81 MG tablet Take  by mouth daily. (Patient not taking: Reported on 6/6/2024)      metaxalone (Skelaxin) 800 MG tablet Take 1 tablet by mouth Every Night. (Patient not taking: Reported on 2/6/2024) 14 tablet 0    mupirocin (BACTROBAN) 2 % cream Apply 1 Application topically to the appropriate area as directed 2 (Two) Times a Day. 60 g 1    nystatin-triamcinolone (MYCOLOG II) 461290-2.1 UNIT/GM-% cream Apply 1 Application topically to the appropriate area as directed 2 (Two) Times a Day.      pilocarpine (PILOCAR) 4 %  "ophthalmic solution        No facility-administered medications prior to visit.       No opioid medication identified on active medication list. I have reviewed chart for other potential  high risk medication/s and harmful drug interactions in the elderly.        Aspirin is on active medication list. Aspirin use is indicated based on review of current medical condition/s. Pros and cons of this therapy have been discussed today. Benefits of this medication outweigh potential harm.  Patient has been encouraged to continue taking this medication.  .      Patient Active Problem List   Diagnosis    Abnormal mammogram    Anemia    Depression    Uncontrolled type 2 diabetes mellitus with hyperglycemia    Mixed hyperlipidemia    Hypertension    Anxiety    Vitamin D deficiency     Advance Care Planning   Advance Care Planning     Advance Directive is not on file.  ACP discussion was held with the patient during this visit. Patient does not have an advance directive, information provided.     Objective    Vitals:    06/06/24 1414   BP: 126/60   BP Location: Right leg   Patient Position: Sitting   Cuff Size: Adult   Pulse: 68   Resp: 18   Temp: 98.2 °F (36.8 °C)   TempSrc: Infrared   Weight: 69.4 kg (153 lb)   Height: Comment: Unable to remove shoes   PainSc:   3   PainLoc: Head     Estimated body mass index is 28.91 kg/m² as calculated from the following:    Height as of 9/11/23: 154.9 cm (61\").    Weight as of this encounter: 69.4 kg (153 lb).       Finger Rub Hearing{Test (right ear):passed  Finger Rub Hearing{Test (left ear):passed      Does the patient have evidence of cognitive impairment?   No    Lab Results   Component Value Date    HGBA1C 8.2 (A) 05/02/2024          HEALTH RISK ASSESSMENT    Smoking Status:  Social History     Tobacco Use   Smoking Status Never    Passive exposure: Past   Smokeless Tobacco Never     Alcohol Consumption:  Social History     Substance and Sexual Activity   Alcohol Use No     Fall Risk " Screen:    KAUSHAL Fall Risk Assessment was completed, and patient is at HIGH risk for falls. Assessment completed on:2024    Depression Screenin/6/2024     2:30 PM   PHQ-2/PHQ-9 Depression Screening   Little Interest or Pleasure in Doing Things 0-->not at all   Feeling Down, Depressed or Hopeless 1-->several days   PHQ-9: Brief Depression Severity Measure Score 1       Health Habits and Functional and Cognitive Screenin/6/2024     2:27 PM   Functional & Cognitive Status   Do you have difficulty preparing food and eating? Yes   Do you have difficulty bathing yourself, getting dressed or grooming yourself? Yes   Do you have difficulty using the toilet? No   Do you have difficulty moving around from place to place? No   Do you have trouble with steps or getting out of a bed or a chair? Yes   Current Diet Limited Junk Food   Dental Exam Up to date   Eye Exam Up to date   Exercise (times per week) 3 times per week   Current Exercises Include Walking   Do you need help using the phone?  No   Are you deaf or do you have serious difficulty hearing?  Yes   Do you need help to go to places out of walking distance? Yes   Do you need help shopping? Yes   Do you need help preparing meals?  Yes   Do you need help with housework?  Yes   Do you need help with laundry? No   Do you need help taking your medications? Yes   Do you need help managing money? No   Do you ever drive or ride in a car without wearing a seat belt? No   Have you felt unusual stress, anger or loneliness in the last month? Yes   Who do you live with? Spouse   If you need help, do you have trouble finding someone available to you? Yes   Have you been bothered in the last four weeks by sexual problems? No   Do you have difficulty concentrating, remembering or making decisions? Yes       Age-appropriate Screening Schedule:  Refer to the list below for future screening recommendations based on patient's age, sex and/or medical conditions.  Orders for these recommended tests are listed in the plan section. The patient has been provided with a written plan.    Health Maintenance   Topic Date Due    DXA SCAN  Never done    ZOSTER VACCINE (1 of 2) Never done    RSV Vaccine - Adults (1 - 1-dose 60+ series) Never done    COVID-19 Vaccine (3 - 2023-24 season) 09/01/2023    COLORECTAL CANCER SCREENING  10/07/2023    ANNUAL WELLNESS VISIT  06/21/2024    Pneumococcal Vaccine 65+ (3 of 3 - PPSV23 or PCV20) 06/21/2024 (Originally 1/25/2022)    TDAP/TD VACCINES (1 - Tdap) 06/21/2024 (Originally 7/17/1974)    INFLUENZA VACCINE  08/01/2024    DIABETIC EYE EXAM  10/30/2024    HEMOGLOBIN A1C  11/02/2024    MAMMOGRAM  02/06/2025    LIPID PANEL  02/06/2025    URINE MICROALBUMIN  02/06/2025    BMI FOLLOWUP  02/06/2025    DIABETIC FOOT EXAM  05/02/2025    PAP SMEAR  02/15/2026    HEPATITIS C SCREENING  Completed                  CMS Preventative Services Quick Reference  Risk Factors Identified During Encounter:    Immunizations Discussed/Encouraged: Shingrix, COVID19, and RSV (Respiratory Syncytial Virus)    The above risks/problems have been discussed with the patient.  Pertinent information has been shared with the patient in the After Visit Summary.    Diagnoses and all orders for this visit:    1. Medicare annual wellness visit, subsequent (Primary)    2. Physical exam    3. Type 2 diabetes mellitus without complication, without long-term current use of insulin  -     Comprehensive Metabolic Panel; Future  -     Hemoglobin A1c; Future  -     Cancel: POC Microalbumin; Future  -     Comprehensive Metabolic Panel  -     Hemoglobin A1c  -     Microalbumin / Creatinine Urine Ratio - Urine, Clean Catch; Future  -     Microalbumin / Creatinine Urine Ratio - Urine, Clean Catch    4. Hyperlipidemia, unspecified hyperlipidemia type  -     Comprehensive Metabolic Panel; Future  -     Lipid Panel; Future  -     Comprehensive Metabolic Panel  -     Lipid Panel    5. Essential  hypertension  -     CBC & Differential; Future  -     Comprehensive Metabolic Panel; Future  -     Cancel: CBC & Differential  -     Comprehensive Metabolic Panel    6. Reactive depression  -     POC Urinalysis Dipstick, Automated; Future  -     POC Urinalysis Dipstick, Automated    7. Anemia, unspecified type  -     CBC & Differential; Future  -     CBC & Differential  -     Folate  -     BELEM + PE  -     Iron Profile  -     Reticulocytes  -     Vitamin B12  -     Ferritin  -     Cancel: CBC & Differential    8. Screening for colon cancer  -     Ambulatory Referral For Screening Colonoscopy          Follow Up:   Next Medicare Wellness visit to be scheduled in 1 year.      An After Visit Summary and PPPS were made available to the patient.

## 2024-06-06 NOTE — PATIENT INSTRUCTIONS
Medicare Wellness  Personal Prevention Plan of Service     Date of Office Visit:    Encounter Provider:  Janes Almazan MD  Place of Service:  [facility]  Patient Name: Zeinab Rodriguez  :  1955    As part of the Medicare Wellness portion of your visit today, we are providing you with this personalized preventive plan of services (PPPS). This plan is based upon recommendations of the [place] Preventive Services Task Force (USPSTF) and the Advisory Committee on Immunization Practices (ACIP).    This lists the preventive care services that should be considered, and provides dates of when you are due. Items listed as completed are up-to-date and do not require any further intervention.    Health Maintenance   Topic Date Due    DXA SCAN  Never done    ZOSTER VACCINE (1 of 2) Never done    RSV Vaccine - Adults (1 - 1-dose 60+ series) Never done    COVID-19 Vaccine (3 - - season) 2023    COLORECTAL CANCER SCREENING  10/07/2023    Pneumococcal Vaccine 65+ (3 of 3 - PPSV23 or PCV20) 2024 (Originally 2022)    TDAP/TD VACCINES (1 - Tdap) 2024 (Originally 1974)    INFLUENZA VACCINE  2024    DIABETIC EYE EXAM  10/30/2024    HEMOGLOBIN A1C  2024    MAMMOGRAM  2025    LIPID PANEL  2025    URINE MICROALBUMIN  2025    BMI FOLLOWUP  2025    DIABETIC FOOT EXAM  2025    ANNUAL WELLNESS VISIT  2025    PAP SMEAR  02/15/2026    HEPATITIS C SCREENING  Completed       Orders Placed This Encounter   Procedures    Comprehensive Metabolic Panel     Standing Status:   Future     Standing Expiration Date:   2025     Order Specific Question:   Release to patient     Answer:   Routine Release [[id number]]    Lipid Panel     Standing Status:   Future     Standing Expiration Date:   2025     Order Specific Question:   Release to patient     Answer:   Routine Release [[id number]]     Hemoglobin A1c     Standing Status:   Future     Standing Expiration Date:   6/7/2025     Order Specific Question:   Release to patient     Answer:   Routine Release [[id number]]    Ambulatory Referral For Screening Colonoscopy     Referral Priority:   Routine     Referral Type:   Diagnostic Medical     Referral Reason:   Specialty Services Required     Referred to Provider:   Pancho Marie MD     Number of Visits Requested:   1    POC Microalbumin     Standing Status:   Future     Order Specific Question:   Release to patient     Answer:   Routine Release [[id number]]    POC Urinalysis Dipstick, Automated     Standing Status:   Future     Order Specific Question:   Release to patient     Answer:   Routine Release [[id number]]    CBC & Differential     Standing Status:   Future     Standing Expiration Date:   6/7/2025     Order Specific Question:   Manual Differential     Answer:   No     Order Specific Question:   Release to patient     Answer:   Routine Release [[id number]]       Return in about 4 months (around 10/6/2024) for Follow-up.          Advance Care Planning and Advance Directives     You make decisions on a daily basis - decisions about where you want to live, your career, your home, your life. Perhaps one of the most important decisions you face is your choice for future medical care. Take time to talk with your family and your healthcare team and start planning today.  Advance Care Planning is a process that can help you:  Understand possible future healthcare decisions in light of your own experiences  Reflect on those decision in light of your goals and values  Discuss your decisions with those closest to you and the healthcare professionals that care for you  Make a plan by creating a document that reflects your wishes    Surrogate Decision Maker  In the event of a medical emergency, which has left you unable to communicate or to make your own decisions, you would need someone to make  decisions for you.  It is important to discuss your preferences for medical treatment with this person while you are in good health.     Qualities of a surrogate decision maker:  Willing to take on this role and responsibility  Knows what you want for future medical care  Willing to follow your wishes even if they don't agree with them  Able to make difficult medical decisions under stressful circumstances    Advance Directives  These are legal documents you can create that will guide your healthcare team and decision maker(s) when needed. These documents can be stored in the electronic medical record.    Living Will - a legal document to guide your care if you have a terminal condition or a serious illness and are unable to communicate. States vary by statute in document names/types, but most forms may include one or more of the following:        -  Directions regarding life-prolonging treatments        -  Directions regarding artificially provided nutrition/hydration        -  Choosing a healthcare decision maker        -  Direction regarding organ/tissue donation    Durable Power of  for Healthcare - this document names an -in-fact to make medical decisions for you, but it may also allow this person to make personal and financial decisions for you. Please seek the advice of an  if you need this type of document.    **Advance Directives are not required and no one may discriminate against you if you do not sign one.    Medical Orders  Many states allow specific forms/orders signed by your physician to record your wishes for medical treatment in your current state of health. This form, signed in personal communication with your physician, addresses resuscitation and other medical interventions that you may or may not want.      For more information or to schedule a time with a [facility] Advance Care Planning Facilitator contact: [facility] or call [phone] and someone  will contact you directly.

## 2024-06-06 NOTE — PROGRESS NOTES
Chief Complaint   Patient presents with    Medicare Wellness-subsequent       History of Present Illness      The patient presents for an established patient physical examination and health maintenance visit.    The patient presents for a follow-up related to hypertension. The patient reports that she has had no headaches, chest pain, dyspnea, edema, syncope, blurred vision or palpitations. She states that she is taking her medication as prescribed. She is not having medication side effects.    The patient presents for a follow-up related to Type 2 Diabetes Mellitus. She checks her blood sugars at home. Her sugars are checked daily. The average sugars are in the 100-150 range. She denies hypoglycemic symptoms. The patient denies polyuria, polydypsia or polyphagia. She reports no symptoms of neuropathy. The patient takes her medication as prescribed. She is not taking insulin. The patient does check her feet daily at home. She denies orthopnea, paroxysmal nocturnal dyspnea or dyspnea on exertion.    The patient presents for a follow-up related to hyperlipidemia. She is following a low fat diet. She reports that she is exercising. She is taking atorvastatin. The patient is taking her medication as prescribed. She reports no medication side effects, including muscle cramps, abdominal pain, headaches or weakness.    The patient presents for a follow-up related to anemia. There are no reports of blood loss. The patient has no symptoms of a dry cough, a wet cough, wheezing, fever, nausea, vomiting, diarrhea, myalgias, abdominal pain, sweats, weight loss, decreased appetite, chills, fatigue or paresthesias. The patient's energy level is normal.    The patient presents for a follow-up related to depression. She denies currently having depression symptoms. She denies suicidal ideation. Her energy level is good. She denies agorophobia. She sleeps well. She is not tearful. She states that her current depression symptoms are  stable. She is currently taking a medication. The current medication regimen consists of paroxetine. The patient denies having medication side effects including nausea, headaches, anxiety, increased depression or fatigue.    Medications      Current Outpatient Medications:     acetaZOLAMIDE (DIAMOX) 500 MG capsule, Take 1 capsule by mouth Daily., Disp: , Rfl:     amLODIPine (NORVASC) 5 MG tablet, Take 1 tablet by mouth Daily., Disp: 90 tablet, Rfl: 1    atenolol (TENORMIN) 100 MG tablet, Take 1 tablet by mouth Daily., Disp: 90 tablet, Rfl: 1    atorvastatin (LIPITOR) 20 MG tablet, Take 1 tablet by mouth Every Night., Disp: 90 tablet, Rfl: 1    atropine 1 % ophthalmic solution, Administer 1 drop into the left eye 2 (Two) Times a Day., Disp: , Rfl:     CILOXAN 0.3 % ophthalmic ointment, Administer 1 application to the right eye Daily., Disp: , Rfl:     dorzolamide-timolol (COSOPT) 22.3-6.8 MG/ML ophthalmic solution, Apply  to eye 2 (two) times a day., Disp: , Rfl:     glipizide (GLUCOTROL) 5 MG tablet, Take one tablet by mouth twice daily before food, Disp: 180 tablet, Rfl: 0    hydroCHLOROthiazide 25 MG tablet, Take 1 tablet by mouth Daily., Disp: 90 tablet, Rfl: 1    insulin degludec (Tresiba FlexTouch) 100 UNIT/ML solution pen-injector injection, Inject 50 Units under the skin into the appropriate area as directed Daily. TO REPLACE LEVEMIR (Patient taking differently: Inject 47 Units under the skin into the appropriate area as directed Daily. TO REPLACE LEVEMIR), Disp: 45 mL, Rfl: 0    Insulin Pen Needle 32G X 4 MM misc, Use daily with insulin, Disp: 100 each, Rfl: 3    Lancets misc, USE TO TEST ONCE DAILY AND AS NEEDED, Disp: 100 each, Rfl: 3    lisinopril (PRINIVIL,ZESTRIL) 40 MG tablet, Take 1 tablet by mouth Daily., Disp: 90 tablet, Rfl: 1    metFORMIN ER (GLUCOPHAGE-XR) 500 MG 24 hr tablet, Take 2 tablets by mouth 2 (Two) Times a Day., Disp: 360 tablet, Rfl: 0    metroNIDAZOLE (METROGEL) 1 % gel, Apply   topically Daily., Disp: 60 g, Rfl: 3    Multiple Vitamins-Minerals (ICAPS AREDS FORMULA PO), Take  by mouth 2 (two) times a day., Disp: , Rfl:     multivitamin (THERAGRAN) tablet tablet, Take  by mouth Daily., Disp: , Rfl:     PARoxetine (PAXIL) 20 MG tablet, Take 1 tablet by mouth Every Morning., Disp: 90 tablet, Rfl: 1    prednisoLONE acetate (PRED FORTE) 1 % ophthalmic suspension, Administer 1 drop into the left eye 4 (Four) Times a Day., Disp: , Rfl:     Rocklatan 0.02-0.005 % solution, , Disp: , Rfl:     SITagliptin (Januvia) 100 MG tablet, TAKE 1 TABLET ONCE DAILY FOR DIABETES, Disp: 90 tablet, Rfl: 1    triamcinolone (KENALOG) 0.1 % lotion, Apply  topically to the appropriate area as directed 3 (Three) Times a Day., Disp: 120 mL, Rfl: 1    True Metrix Blood Glucose Test test strip, Use to test blood glucose once daily and as needed, Disp: 100 each, Rfl: 3    aspirin 81 MG tablet, Take  by mouth daily. (Patient not taking: Reported on 6/6/2024), Disp: , Rfl:      Allergies    No Known Allergies    Problem List    Patient Active Problem List   Diagnosis    Abnormal mammogram    Anemia    Depression    Uncontrolled type 2 diabetes mellitus with hyperglycemia    Mixed hyperlipidemia    Hypertension    Anxiety    Vitamin D deficiency       Medications, Allergies, Problems List and Past History were reviewed and updated.    Physical Examination    /60 (BP Location: Right leg, Patient Position: Sitting, Cuff Size: Adult)   Pulse 68   Temp 98.2 °F (36.8 °C) (Infrared)   Resp 18   Wt 69.4 kg (153 lb)   LMP  (LMP Unknown) Comment: Last pap 1/2020 by JEREMIE Watson  BMI 28.91 kg/m²       HEENT: Head- Normocephalic Atraumatic. Facies- Within normal limits. Pinnas- Normal texture and shape bilaterally. Canals- Normal bilaterally. TMs- Normal bilaterally. Nares- Patent bilaterally. Nasal Septum- is normal. There is no tenderness to palpation over the frontal or maxillary sinuses. Lids- Normal bilaterally.  Conjunctiva- Clear bilaterally. Sclera- Anicteric bilaterally. Oropharynx- Moist with no lesions. Tonsils- No enlargement, erythema or exudate.    Neck: Thyroid- non enlarged, symmetric and has no nodules. No bruits are detected. ROM- Normal Range of Motion with no rigidity.    Lungs: Auscultation- Clear to auscultation bilaterally. There are no retractions, clubbing or cyanosis. The Expiratory to Inspiratory ratio is equal.    Lymph Nodes: Cervical- no enlarged lymph nodes noted. Clavicular- Deferred. Axillary- Deferred. Inguinal- Deferred.    Cardiovascular: There are no carotid bruits. Heart- Normal Rate with Regular rhythm and no murmurs. There are no gallops. There are no rubs. In the lower extremities there is no edema. The upper extremities do not have edema.    Abdomen: Soft, benign, non-tender with no masses, hernias, organomegaly or scars.    Breast: Examination reveals that the right breast has normal contours with no masses, discharge, skin changes, or lumps and the left breast has skin changes. There are no scars noted.    Feet: The feet are symmetric with normal blanca landmarks. There is no tenderness to palpation bilaterally. The feet have normal posterior tibial and dorsalis pedis pulses and normal capillary refill bilaterally. The monofilament examination is normal bilaterally.       The arches are normal bilaterally. There are no skin or nail lesions present. There are no ingrown nails. There are no bunions noted.    Dermatologic: The patient has no worrisome or suspicious skin lesions noted.    Impression and Assessment    Normal Physical Examination.    Essential Hypertension.    Type 2 Diabetes Mellitus without complication without insulin usage.    Hyperlipidemia.    Anemia.    Reactive Depression.    Plan    Essential Hypertension Plan: The patient was instructed to continue the current medications.    Hyperlipidemia Plan: The patient was instructed to exercise daily, eat a low fat diet and  continue her medications.    Type 2 Diabetes Mellitus without complication without insulin usage Plan: The patient was instructed to continue the current medications.    Reactive Depression Plan: The patient was instructed to continue the current medications.    Anemia Plan: The patient was instructed to continue the current medications.    Counseling was provided regarding: Adequate Aerobic Exercise, Dental Visits, Flossing Teeth, Heart Healthy Diet and Weight Lose.    The following was ordered for screening and health maintenance: Colonoscopy.       Immunizations Ordered and Administered: None.    Diagnoses and all orders for this visit:    1. Medicare annual wellness visit, subsequent (Primary)    2. Physical exam    3. Type 2 diabetes mellitus without complication, without long-term current use of insulin  -     Comprehensive Metabolic Panel; Future  -     Hemoglobin A1c; Future  -     Cancel: POC Microalbumin; Future  -     Comprehensive Metabolic Panel  -     Hemoglobin A1c  -     Microalbumin / Creatinine Urine Ratio - Urine, Clean Catch; Future  -     Microalbumin / Creatinine Urine Ratio - Urine, Clean Catch    4. Hyperlipidemia, unspecified hyperlipidemia type  -     Comprehensive Metabolic Panel; Future  -     Lipid Panel; Future  -     Comprehensive Metabolic Panel  -     Lipid Panel    5. Essential hypertension  -     CBC & Differential; Future  -     Comprehensive Metabolic Panel; Future  -     Cancel: CBC & Differential  -     Comprehensive Metabolic Panel    6. Reactive depression  -     POC Urinalysis Dipstick, Automated; Future  -     POC Urinalysis Dipstick, Automated    7. Anemia, unspecified type  -     CBC & Differential; Future  -     CBC & Differential  -     Folate  -     BELEM + PE  -     Iron Profile  -     Reticulocytes  -     Vitamin B12  -     Ferritin  -     Cancel: CBC & Differential    8. Screening for colon cancer  -     Ambulatory Referral For Screening Colonoscopy        Return to  Office    The patient was instructed to return for follow-up in 4 months. The patient was instructed to return sooner if the condition changes, worsens, or does not resolve.

## 2024-06-07 ENCOUNTER — LAB (OUTPATIENT)
Dept: LAB | Facility: HOSPITAL | Age: 69
End: 2024-06-07
Payer: MEDICARE

## 2024-06-07 DIAGNOSIS — D50.9 IRON DEFICIENCY ANEMIA, UNSPECIFIED IRON DEFICIENCY ANEMIA TYPE: Primary | ICD-10-CM

## 2024-06-07 LAB
ALBUMIN SERPL-MCNC: 4.2 G/DL (ref 3.5–5.2)
ALBUMIN UR-MCNC: 1.3 MG/DL
ALBUMIN/GLOB SERPL: 1.6 G/DL
ALP SERPL-CCNC: 82 U/L (ref 39–117)
ALT SERPL W P-5'-P-CCNC: 19 U/L (ref 1–33)
ANION GAP SERPL CALCULATED.3IONS-SCNC: 12.9 MMOL/L (ref 5–15)
AST SERPL-CCNC: 21 U/L (ref 1–32)
BASOPHILS # BLD AUTO: 0.04 10*3/MM3 (ref 0–0.2)
BASOPHILS NFR BLD AUTO: 0.9 % (ref 0–1.5)
BILIRUB SERPL-MCNC: 0.3 MG/DL (ref 0–1.2)
BUN SERPL-MCNC: 12 MG/DL (ref 8–23)
BUN/CREAT SERPL: 20.3 (ref 7–25)
CALCIUM SPEC-SCNC: 9.2 MG/DL (ref 8.6–10.5)
CHLORIDE SERPL-SCNC: 106 MMOL/L (ref 98–107)
CHOLEST SERPL-MCNC: 113 MG/DL (ref 0–200)
CO2 SERPL-SCNC: 22.1 MMOL/L (ref 22–29)
CREAT SERPL-MCNC: 0.59 MG/DL (ref 0.57–1)
CREAT UR-MCNC: 78.5 MG/DL
DEPRECATED RDW RBC AUTO: 45 FL (ref 37–54)
EGFRCR SERPLBLD CKD-EPI 2021: 97.7 ML/MIN/1.73
EOSINOPHIL # BLD AUTO: 0.15 10*3/MM3 (ref 0–0.4)
EOSINOPHIL NFR BLD AUTO: 3.5 % (ref 0.3–6.2)
ERYTHROCYTE [DISTWIDTH] IN BLOOD BY AUTOMATED COUNT: 15.5 % (ref 12.3–15.4)
FERRITIN SERPL-MCNC: 16.5 NG/ML (ref 13–150)
FOLATE SERPL-MCNC: >20 NG/ML (ref 4.78–24.2)
GLOBULIN UR ELPH-MCNC: 2.6 GM/DL
GLUCOSE SERPL-MCNC: 174 MG/DL (ref 65–99)
HBA1C MFR BLD: 7.6 % (ref 4.8–5.6)
HCT VFR BLD AUTO: 39 % (ref 34–46.6)
HDLC SERPL-MCNC: 37 MG/DL (ref 40–60)
HGB BLD-MCNC: 12.1 G/DL (ref 12–15.9)
IMM GRANULOCYTES # BLD AUTO: 0.01 10*3/MM3 (ref 0–0.05)
IMM GRANULOCYTES NFR BLD AUTO: 0.2 % (ref 0–0.5)
IRON 24H UR-MRATE: 54 MCG/DL (ref 37–145)
IRON SATN MFR SERPL: 10 % (ref 20–50)
LDLC SERPL CALC-MCNC: 45 MG/DL (ref 0–100)
LDLC/HDLC SERPL: 1.04 {RATIO}
LYMPHOCYTES # BLD AUTO: 0.96 10*3/MM3 (ref 0.7–3.1)
LYMPHOCYTES NFR BLD AUTO: 22.2 % (ref 19.6–45.3)
MCH RBC QN AUTO: 24.9 PG (ref 26.6–33)
MCHC RBC AUTO-ENTMCNC: 31 G/DL (ref 31.5–35.7)
MCV RBC AUTO: 80.2 FL (ref 79–97)
MICROALBUMIN/CREAT UR: 16.6 MG/G (ref 0–29)
MONOCYTES # BLD AUTO: 0.42 10*3/MM3 (ref 0.1–0.9)
MONOCYTES NFR BLD AUTO: 9.7 % (ref 5–12)
NEUTROPHILS NFR BLD AUTO: 2.74 10*3/MM3 (ref 1.7–7)
NEUTROPHILS NFR BLD AUTO: 63.5 % (ref 42.7–76)
NRBC BLD AUTO-RTO: 0 /100 WBC (ref 0–0.2)
PLATELET # BLD AUTO: 148 10*3/MM3 (ref 140–450)
PMV BLD AUTO: 11.6 FL (ref 6–12)
POTASSIUM SERPL-SCNC: 3.7 MMOL/L (ref 3.5–5.2)
PROT SERPL-MCNC: 6.8 G/DL (ref 6–8.5)
RBC # BLD AUTO: 4.86 10*6/MM3 (ref 3.77–5.28)
RETICS # AUTO: 0.07 10*6/MM3 (ref 0.02–0.13)
RETICS/RBC NFR AUTO: 1.52 % (ref 0.7–1.9)
SODIUM SERPL-SCNC: 141 MMOL/L (ref 136–145)
TIBC SERPL-MCNC: 535 MCG/DL (ref 298–536)
TRANSFERRIN SERPL-MCNC: 359 MG/DL (ref 200–360)
TRIGL SERPL-MCNC: 188 MG/DL (ref 0–150)
VIT B12 BLD-MCNC: 476 PG/ML (ref 211–946)
VLDLC SERPL-MCNC: 31 MG/DL (ref 5–40)
WBC NRBC COR # BLD AUTO: 4.32 10*3/MM3 (ref 3.4–10.8)

## 2024-06-07 PROCEDURE — 85045 AUTOMATED RETICULOCYTE COUNT: CPT

## 2024-06-07 PROCEDURE — 85025 COMPLETE CBC W/AUTO DIFF WBC: CPT

## 2024-06-07 PROCEDURE — 82746 ASSAY OF FOLIC ACID SERUM: CPT

## 2024-06-07 PROCEDURE — 84466 ASSAY OF TRANSFERRIN: CPT

## 2024-06-07 PROCEDURE — 83540 ASSAY OF IRON: CPT

## 2024-06-07 PROCEDURE — 36415 COLL VENOUS BLD VENIPUNCTURE: CPT

## 2024-06-07 PROCEDURE — 82607 VITAMIN B-12: CPT

## 2024-06-07 PROCEDURE — 82728 ASSAY OF FERRITIN: CPT

## 2024-06-10 LAB
ALBUMIN SERPL ELPH-MCNC: 3.5 G/DL (ref 2.9–4.4)
ALBUMIN/GLOB SERPL: 1.2 {RATIO} (ref 0.7–1.7)
ALPHA1 GLOB SERPL ELPH-MCNC: 0.3 G/DL (ref 0–0.4)
ALPHA2 GLOB SERPL ELPH-MCNC: 0.7 G/DL (ref 0.4–1)
B-GLOBULIN SERPL ELPH-MCNC: 1 G/DL (ref 0.7–1.3)
GAMMA GLOB SERPL ELPH-MCNC: 1 G/DL (ref 0.4–1.8)
GLOBULIN SER-MCNC: 3 G/DL (ref 2.2–3.9)
IGA SERPL-MCNC: 143 MG/DL (ref 87–352)
IGG SERPL-MCNC: 930 MG/DL (ref 586–1602)
IGM SERPL-MCNC: 92 MG/DL (ref 26–217)
INTERPRETATION SERPL IEP-IMP: ABNORMAL
LABORATORY COMMENT REPORT: ABNORMAL
M PROTEIN SERPL ELPH-MCNC: 0.2 G/DL
PROT SERPL-MCNC: 6.5 G/DL (ref 6–8.5)

## 2024-06-11 DIAGNOSIS — E11.65 UNCONTROLLED TYPE 2 DIABETES MELLITUS WITH HYPERGLYCEMIA: ICD-10-CM

## 2024-06-11 RX ORDER — ATENOLOL 100 MG/1
100 TABLET ORAL DAILY
Qty: 90 TABLET | Refills: 1 | Status: SHIPPED | OUTPATIENT
Start: 2024-06-11

## 2024-06-11 RX ORDER — METFORMIN HYDROCHLORIDE 500 MG/1
1000 TABLET, EXTENDED RELEASE ORAL 2 TIMES DAILY
Qty: 360 TABLET | Refills: 1 | Status: SHIPPED | OUTPATIENT
Start: 2024-06-11

## 2024-06-11 NOTE — TELEPHONE ENCOUNTER
Caller: Zeinab Rodriguez    Relationship: Self    Best call back number: [phone]    Requested Prescriptions:   Requested Prescriptions     Pending Prescriptions Disp Refills    atenolol (TENORMIN) 100 MG tablet 90 tablet 1     Sig: Take 1 tablet by mouth Daily.        Pharmacy where request should be sent: [address] [phone] [facility] [phone]      Last office visit with prescribing clinician: 6/6/2024   Last telemedicine visit with prescribing clinician: Visit date not found   Next office visit with prescribing clinician: 10/8/2024     Additional details provided by patient: PATIENT WOULD LIKE A 90 DAY SUPPLY AND HAS ENOUGH MEDICATION TO LAST A WEEK     Does the patient have less than a 3 day supply:  [] Yes  [x] No    Would you like a call back once the refill request has been completed: [] Yes [x] No    If the office needs to give you a call back, can they leave a voicemail: [] Yes [x] No    Veronica St Rep   06/11/24 15:20 EDT

## 2024-06-11 NOTE — TELEPHONE ENCOUNTER
Caller: Zeinab Rodriguez    Relationship: Self    Best call back number: [phone]    Requested Prescriptions:   Requested Prescriptions     Pending Prescriptions Disp Refills    metFORMIN ER (GLUCOPHAGE-XR) 500 MG 24 hr tablet 360 tablet 0     Sig: Take 2 tablets by mouth 2 (Two) Times a Day.        Pharmacy where request should be sent:    [facility]  Last office visit with prescribing clinician: 5/2/2024   Last telemedicine visit with prescribing clinician: Visit date not found   Next office visit with prescribing clinician: 9/10/2024     Additional details provided by patient: WILL BE OUT OF METFORMIN BY THE END OF WEEK. SHE NEEDS A 90 SUPPLY     Does the patient have less than a 3 day supply:  [x] Yes  [] No    Would you like a call back once the refill request has been completed: [] Yes [] No    If the office needs to give you a call back, can they leave a voicemail: [] Yes [] No    Veronica Coppola Rep   06/11/24 15:16 EDT

## 2024-07-05 RX ORDER — INSULIN DEGLUDEC 100 U/ML
50 INJECTION, SOLUTION SUBCUTANEOUS DAILY
Qty: 45 ML | Refills: 0 | Status: SHIPPED | OUTPATIENT
Start: 2024-07-05

## 2024-07-05 NOTE — TELEPHONE ENCOUNTER
Caller: Zeinab Rodriguez ZUNILDA    Relationship: Self    Best call back number: [phone]    Requested Prescriptions:   Requested Prescriptions     Pending Prescriptions Disp Refills    insulin degludec (Tresiba FlexTouch) 100 UNIT/ML solution pen-injector injection 45 mL 0     Sig: Inject 50 Units under the skin into the appropriate area as directed Daily. TO REPLACE LEVIR        Pharmacy where request should be sent:  [facility] [address] [phone] [facility] [phone] FX [[id number]]     Last office visit with prescribing clinician: 5/2/2024   Last telemedicine visit with prescribing clinician: Visit date not found   Next office visit with prescribing clinician: 9/10/2024     Additional details provided by patient:     Does the patient have less than a 3 day supply:  [] Yes  [x] No    Would you like a call back once the refill request has been completed: [] Yes [] No    If the office needs to give you a call back, can they leave a voicemail: [] Yes [] No    Veronica Wells Rep   07/05/24 13:49 EDT

## 2024-07-19 DIAGNOSIS — E11.649 CONTROLLED TYPE 2 DIABETES MELLITUS WITH HYPOGLYCEMIA, WITH LONG-TERM CURRENT USE OF INSULIN: Chronic | ICD-10-CM

## 2024-07-19 DIAGNOSIS — Z79.4 CONTROLLED TYPE 2 DIABETES MELLITUS WITH HYPOGLYCEMIA, WITH LONG-TERM CURRENT USE OF INSULIN: Chronic | ICD-10-CM

## 2024-07-19 RX ORDER — GLIPIZIDE 5 MG/1
TABLET ORAL
Qty: 180 TABLET | Refills: 0 | Status: SHIPPED | OUTPATIENT
Start: 2024-07-19

## 2024-07-23 DIAGNOSIS — E78.5 HYPERLIPIDEMIA, UNSPECIFIED HYPERLIPIDEMIA TYPE: ICD-10-CM

## 2024-07-23 DIAGNOSIS — E11.9 TYPE 2 DIABETES MELLITUS WITHOUT COMPLICATION, WITHOUT LONG-TERM CURRENT USE OF INSULIN: ICD-10-CM

## 2024-07-23 RX ORDER — ATORVASTATIN CALCIUM 20 MG/1
20 TABLET, FILM COATED ORAL NIGHTLY
Qty: 90 TABLET | Refills: 1 | Status: SHIPPED | OUTPATIENT
Start: 2024-07-23

## 2024-07-23 NOTE — TELEPHONE ENCOUNTER
Caller: Zeinab Rodriguez    Relationship: Self    Best call back number: [phone]     Requested Prescriptions:   Requested Prescriptions     Pending Prescriptions Disp Refills    atorvastatin (LIPITOR) 20 MG tablet 90 tablet 1     Sig: Take 1 tablet by mouth Every Night.        Pharmacy where request should be sent: [address] - [phone] [facility] [phone]      Last office visit with prescribing clinician: 6/6/2024   Last telemedicine visit with prescribing clinician: Visit date not found   Next office visit with prescribing clinician: 10/8/2024     Additional details provided by patient: HAS ABOUT 3 DAYS LEFT     Does the patient have less than a 3 day supply:  [x] Yes  [] No    Would you like a call back once the refill request has been completed: [x] Yes [] No    If the office needs to give you a call back, can they leave a voicemail: [x] Yes [] No    Veronica Mendez Rep   07/23/24 15:35 EDT

## 2024-09-09 DIAGNOSIS — E11.65 UNCONTROLLED TYPE 2 DIABETES MELLITUS WITH HYPERGLYCEMIA: ICD-10-CM

## 2024-09-09 RX ORDER — ATENOLOL 100 MG/1
100 TABLET ORAL DAILY
Qty: 90 TABLET | Refills: 1 | Status: SHIPPED | OUTPATIENT
Start: 2024-09-09

## 2024-09-10 ENCOUNTER — OFFICE VISIT (OUTPATIENT)
Dept: ENDOCRINOLOGY | Facility: CLINIC | Age: 69
End: 2024-09-10
Payer: MEDICARE

## 2024-09-10 VITALS
SYSTOLIC BLOOD PRESSURE: 122 MMHG | DIASTOLIC BLOOD PRESSURE: 73 MMHG | WEIGHT: 148 LBS | HEART RATE: 71 BPM | HEIGHT: 61 IN | BODY MASS INDEX: 27.94 KG/M2 | OXYGEN SATURATION: 98 %

## 2024-09-10 DIAGNOSIS — E11.40 CONTROLLED TYPE 2 DIABETES WITH NEUROPATHY: Chronic | ICD-10-CM

## 2024-09-10 DIAGNOSIS — E11.65 UNCONTROLLED TYPE 2 DIABETES MELLITUS WITH HYPERGLYCEMIA: Primary | ICD-10-CM

## 2024-09-10 DIAGNOSIS — I10 PRIMARY HYPERTENSION: ICD-10-CM

## 2024-09-10 DIAGNOSIS — Z79.4 CONTROLLED TYPE 2 DIABETES MELLITUS WITH HYPOGLYCEMIA, WITH LONG-TERM CURRENT USE OF INSULIN: Chronic | ICD-10-CM

## 2024-09-10 DIAGNOSIS — E11.649 CONTROLLED TYPE 2 DIABETES MELLITUS WITH HYPOGLYCEMIA, WITH LONG-TERM CURRENT USE OF INSULIN: Chronic | ICD-10-CM

## 2024-09-10 LAB
EXPIRATION DATE: ABNORMAL
EXPIRATION DATE: ABNORMAL
GLUCOSE BLDC GLUCOMTR-MCNC: 225 MG/DL (ref 70–130)
HBA1C MFR BLD: 7.4 % (ref 4.5–5.7)
Lab: ABNORMAL
Lab: ABNORMAL

## 2024-09-10 PROCEDURE — 83036 HEMOGLOBIN GLYCOSYLATED A1C: CPT | Performed by: PHYSICIAN ASSISTANT

## 2024-09-10 PROCEDURE — 99214 OFFICE O/P EST MOD 30 MIN: CPT | Performed by: PHYSICIAN ASSISTANT

## 2024-09-10 PROCEDURE — 1160F RVW MEDS BY RX/DR IN RCRD: CPT | Performed by: PHYSICIAN ASSISTANT

## 2024-09-10 PROCEDURE — 3051F HG A1C>EQUAL 7.0%<8.0%: CPT | Performed by: PHYSICIAN ASSISTANT

## 2024-09-10 PROCEDURE — 3078F DIAST BP <80 MM HG: CPT | Performed by: PHYSICIAN ASSISTANT

## 2024-09-10 PROCEDURE — 3074F SYST BP LT 130 MM HG: CPT | Performed by: PHYSICIAN ASSISTANT

## 2024-09-10 PROCEDURE — 1159F MED LIST DOCD IN RCRD: CPT | Performed by: PHYSICIAN ASSISTANT

## 2024-09-10 PROCEDURE — 82947 ASSAY GLUCOSE BLOOD QUANT: CPT | Performed by: PHYSICIAN ASSISTANT

## 2024-09-10 RX ORDER — GLIPIZIDE 5 MG/1
TABLET ORAL
Qty: 180 TABLET | Refills: 2 | Status: SHIPPED | OUTPATIENT
Start: 2024-09-10

## 2024-09-10 RX ORDER — METFORMIN HCL 500 MG
TABLET, EXTENDED RELEASE 24 HR ORAL
Qty: 360 TABLET | Refills: 1 | OUTPATIENT
Start: 2024-09-10

## 2024-09-10 RX ORDER — INSULIN DEGLUDEC 100 U/ML
50 INJECTION, SOLUTION SUBCUTANEOUS DAILY
Qty: 45 ML | Refills: 2 | Status: SHIPPED | OUTPATIENT
Start: 2024-09-10

## 2024-09-10 NOTE — PROGRESS NOTES
"     Office Note      Date: 09/10/2024  Patient Name: Zeinab Rodriguez  MRN: [id number]  : 1955    Chief Complaint   Patient presents with    Diabetes       History of Present Illness:   Zeinab Rodriguez is a 69 y.o. female who presents for follow-up for type 2 diabetes diagnosed in .  She remains on metformin extended release 500 mg 2 tablets twice a day, glipizide 5 mg twice a day, Januvia 100 mg daily and Tresiba 47 units daily.  She reports she has been more active recently walking her dog.  She checks her fasting blood glucose readings and these are typically between 120 to 155 mg/dL.  She reports the lowest blood glucose reading she has had recently has been 115 mg/dL.  She was previously on Jardiance but is off of this due to cost.  She goes regularly for eye exam she has an appointment on Monday.  She is seen every 3 to 4 months.  She denies any trouble with her feet today.  We did her foot exam at her appointment in May.  She recently had fasting labs completed with her primary care physician in .      Subjective     Review of Systems:   Review of Systems   Constitutional: Negative.    Cardiovascular: Negative.    Gastrointestinal: Negative.    Endocrine: Negative.    Neurological: Negative.        The following portions of the patient's history were reviewed and updated as appropriate: allergies, current medications, past family history, past medical history, past social history, past surgical history, and problem list.    Objective     Vitals:    09/10/24 1327   BP: 122/73   Pulse: 71   SpO2: 98%   Weight: 67.1 kg (148 lb)   Height: 154.9 cm (61\")     Body mass index is 27.96 kg/m².    Physical Exam  Vitals reviewed.   Constitutional:       General: She is not in acute distress.     Appearance: Normal appearance.   Neurological:      Mental Status: She is alert.         HEMOGLOBIN A1C  Lab Results   Component Value Date    HGBA1C 7.4 (A) 09/10/2024       GLUCOSE  Glucose   Date Value Ref " Range Status   09/10/2024 225 (A) 70 - 130 mg/dL Final       CMP  Lab Results   Component Value Date    GLUCOSE 174 (H) 06/06/2024    BUN 12 06/06/2024    CREATININE 0.59 06/06/2024    EGFRIFNONA 82 11/08/2021    EGFRIFAFRI 100 11/08/2021    BCR 20.3 06/06/2024    K 3.7 06/06/2024    CO2 22.1 06/06/2024    CALCIUM 9.2 06/06/2024    PROTENTOTREF 6.5 06/06/2024    LABIL2 1.2 06/06/2024    AST 21 06/06/2024    ALT 19 06/06/2024       LIPID PANEL  Lab Results   Component Value Date    CHOL 113 06/06/2024    CHLPL 117 02/06/2024    TRIG 188 (H) 06/06/2024    HDL 37 (L) 06/06/2024    LDL 45 06/06/2024       URINE MICROALBUMIN/CREATININE RATIO  Microalbumin/Creatinine Ratio   Date Value Ref Range Status   06/06/2024 16.6 0.0 - 29.0 mg/g Final       TSH  Lab Results   Component Value Date    TSH 1.170 06/21/2023       Assessment / Plan      Assessment & Plan:  1. Uncontrolled type 2 diabetes mellitus with hyperglycemia  Her hemoglobin A1c has improved as compared to May at 7.4%.  This is still above goal but much better.  We will try increasing her Tresiba to 49 units daily.  She will continue Januvia 100 mg daily, glipizide 5 mg twice a day and metformin extended release 500 mg 2 tablets daily.  I refilled her Tresiba Januvia and glipizide today.  She will reach out if she has any trouble with her blood glucose readings.  Her weight is down 7 pounds since her appointment in May.  I encouraged continued healthy eating habits and physical activity as tolerated.  - POC Glucose, Blood  - POC Glycosylated Hemoglobin (Hb A1C)    2. Primary hypertension  Her blood pressure is okay today.  She will continue her current medications.          Return in about 4 months (around 1/10/2025) for Recheck.     This note was dictated using Dragon voice recognition.    Electronically signed by: PARDEEP Porter  09/10/2024

## 2024-09-13 RX ORDER — HYDROCHLOROTHIAZIDE 25 MG/1
25 TABLET ORAL DAILY
Qty: 90 TABLET | Refills: 1 | Status: SHIPPED | OUTPATIENT
Start: 2024-09-13

## 2024-09-13 RX ORDER — AMLODIPINE BESYLATE 5 MG/1
5 TABLET ORAL DAILY
Qty: 90 TABLET | Refills: 1 | Status: SHIPPED | OUTPATIENT
Start: 2024-09-13

## 2024-09-13 NOTE — TELEPHONE ENCOUNTER
Caller: Zeinab Rodriguez    Relationship: Self    Best call back number: [phone]    Requested Prescriptions:   Requested Prescriptions     Pending Prescriptions Disp Refills    amLODIPine (NORVASC) 5 MG tablet 90 tablet 1     Sig: Take 1 tablet by mouth Daily.    hydroCHLOROthiazide 25 MG tablet 90 tablet 1     Sig: Take 1 tablet by mouth Daily.        Pharmacy where request should be sent: [address] [phone] [facility] [phone]      Last office visit with prescribing clinician: 6/6/2024   Last telemedicine visit with prescribing clinician: Visit date not found   Next office visit with prescribing clinician: 10/8/2024     Additional details provided by patient: THE PATIENT HAS MORE THAN THREE DAYS SHE WANTS A 90 DAY SUPPLY     Does the patient have less than a 3 day supply:  [] Yes  [x] No    Would you like a call back once the refill request has been completed: [] Yes [x] No    If the office needs to give you a call back, can they leave a voicemail: [] Yes [x] No    Veronica St   09/13/24 15:07 EDT

## 2024-10-06 PROCEDURE — 80061 LIPID PANEL: CPT | Performed by: INTERNAL MEDICINE

## 2024-10-08 ENCOUNTER — OFFICE VISIT (OUTPATIENT)
Dept: INTERNAL MEDICINE | Facility: CLINIC | Age: 69
End: 2024-10-08
Payer: MEDICARE

## 2024-10-08 VITALS
SYSTOLIC BLOOD PRESSURE: 132 MMHG | WEIGHT: 160 LBS | OXYGEN SATURATION: 93 % | HEART RATE: 72 BPM | BODY MASS INDEX: 30.21 KG/M2 | HEIGHT: 61 IN | DIASTOLIC BLOOD PRESSURE: 70 MMHG

## 2024-10-08 DIAGNOSIS — F32.9 REACTIVE DEPRESSION: ICD-10-CM

## 2024-10-08 DIAGNOSIS — I10 ESSENTIAL HYPERTENSION: ICD-10-CM

## 2024-10-08 DIAGNOSIS — E11.9 TYPE 2 DIABETES MELLITUS WITHOUT COMPLICATION, WITH LONG-TERM CURRENT USE OF INSULIN: Primary | ICD-10-CM

## 2024-10-08 DIAGNOSIS — E78.5 HYPERLIPIDEMIA, UNSPECIFIED HYPERLIPIDEMIA TYPE: ICD-10-CM

## 2024-10-08 DIAGNOSIS — Z23 NEED FOR IMMUNIZATION AGAINST INFLUENZA: ICD-10-CM

## 2024-10-08 DIAGNOSIS — Z79.4 TYPE 2 DIABETES MELLITUS WITHOUT COMPLICATION, WITH LONG-TERM CURRENT USE OF INSULIN: Primary | ICD-10-CM

## 2024-10-08 LAB
ALBUMIN SERPL-MCNC: 4.1 G/DL (ref 3.5–5.2)
ALBUMIN/GLOB SERPL: 1.5 G/DL
ALP SERPL-CCNC: 93 U/L (ref 39–117)
ALT SERPL W P-5'-P-CCNC: 25 U/L (ref 1–33)
ANION GAP SERPL CALCULATED.3IONS-SCNC: 11 MMOL/L (ref 5–15)
AST SERPL-CCNC: 23 U/L (ref 1–32)
BILIRUB BLD-MCNC: NEGATIVE MG/DL
BILIRUB SERPL-MCNC: 0.4 MG/DL (ref 0–1.2)
BUN SERPL-MCNC: 11 MG/DL (ref 8–23)
BUN/CREAT SERPL: 15.7 (ref 7–25)
CALCIUM SPEC-SCNC: 9.8 MG/DL (ref 8.6–10.5)
CHLORIDE SERPL-SCNC: 99 MMOL/L (ref 98–107)
CLARITY, POC: ABNORMAL
CO2 SERPL-SCNC: 32 MMOL/L (ref 22–29)
COLOR UR: YELLOW
CREAT SERPL-MCNC: 0.7 MG/DL (ref 0.57–1)
EGFRCR SERPLBLD CKD-EPI 2021: 93.2 ML/MIN/1.73
EXPIRATION DATE: ABNORMAL
EXPIRATION DATE: NORMAL
GLOBULIN UR ELPH-MCNC: 2.7 GM/DL
GLUCOSE SERPL-MCNC: 267 MG/DL (ref 65–99)
GLUCOSE UR STRIP-MCNC: ABNORMAL MG/DL
KETONES UR QL: NEGATIVE
LEUKOCYTE EST, POC: NEGATIVE
Lab: ABNORMAL
Lab: NORMAL
NITRITE UR-MCNC: NEGATIVE MG/ML
PH UR: 6 [PH] (ref 5–8)
POC CREATININE URINE: 50
POC MICROALBUMIN URINE: 10
POTASSIUM SERPL-SCNC: 4.6 MMOL/L (ref 3.5–5.2)
PROT SERPL-MCNC: 6.8 G/DL (ref 6–8.5)
PROT UR STRIP-MCNC: NEGATIVE MG/DL
RBC # UR STRIP: NEGATIVE /UL
SODIUM SERPL-SCNC: 142 MMOL/L (ref 136–145)
SP GR UR: 1 (ref 1–1.03)
TSH SERPL DL<=0.05 MIU/L-ACNC: 1.39 UIU/ML (ref 0.27–4.2)
UROBILINOGEN UR QL: NORMAL

## 2024-10-08 PROCEDURE — 3051F HG A1C>EQUAL 7.0%<8.0%: CPT | Performed by: INTERNAL MEDICINE

## 2024-10-08 PROCEDURE — 84443 ASSAY THYROID STIM HORMONE: CPT | Performed by: INTERNAL MEDICINE

## 2024-10-08 PROCEDURE — 83036 HEMOGLOBIN GLYCOSYLATED A1C: CPT | Performed by: INTERNAL MEDICINE

## 2024-10-08 PROCEDURE — 85025 COMPLETE CBC W/AUTO DIFF WBC: CPT | Performed by: INTERNAL MEDICINE

## 2024-10-08 PROCEDURE — 81003 URINALYSIS AUTO W/O SCOPE: CPT | Performed by: INTERNAL MEDICINE

## 2024-10-08 PROCEDURE — 82044 UR ALBUMIN SEMIQUANTITATIVE: CPT | Performed by: INTERNAL MEDICINE

## 2024-10-08 PROCEDURE — 3075F SYST BP GE 130 - 139MM HG: CPT | Performed by: INTERNAL MEDICINE

## 2024-10-08 PROCEDURE — 3078F DIAST BP <80 MM HG: CPT | Performed by: INTERNAL MEDICINE

## 2024-10-08 PROCEDURE — G0008 ADMIN INFLUENZA VIRUS VAC: HCPCS | Performed by: INTERNAL MEDICINE

## 2024-10-08 PROCEDURE — 99214 OFFICE O/P EST MOD 30 MIN: CPT | Performed by: INTERNAL MEDICINE

## 2024-10-08 PROCEDURE — 80053 COMPREHEN METABOLIC PANEL: CPT | Performed by: INTERNAL MEDICINE

## 2024-10-08 PROCEDURE — 36415 COLL VENOUS BLD VENIPUNCTURE: CPT | Performed by: INTERNAL MEDICINE

## 2024-10-08 PROCEDURE — 90662 IIV NO PRSV INCREASED AG IM: CPT | Performed by: INTERNAL MEDICINE

## 2024-10-08 PROCEDURE — 1125F AMNT PAIN NOTED PAIN PRSNT: CPT | Performed by: INTERNAL MEDICINE

## 2024-10-08 RX ORDER — NYSTATIN AND TRIAMCINOLONE ACETONIDE 100000; 1 [USP'U]/G; MG/G
1 CREAM TOPICAL 2 TIMES DAILY
COMMUNITY

## 2024-10-08 RX ORDER — BUPROPION HYDROCHLORIDE 150 MG/1
150 TABLET ORAL DAILY
Qty: 90 TABLET | Refills: 1 | Status: SHIPPED | OUTPATIENT
Start: 2024-10-08

## 2024-10-08 RX ORDER — PAROXETINE 20 MG/1
20 TABLET, FILM COATED ORAL EVERY MORNING
Qty: 90 TABLET | Refills: 1 | Status: SHIPPED | OUTPATIENT
Start: 2024-10-08

## 2024-10-08 RX ORDER — LISINOPRIL 40 MG/1
40 TABLET ORAL DAILY
Qty: 90 TABLET | Refills: 1 | Status: SHIPPED | OUTPATIENT
Start: 2024-10-08

## 2024-10-08 NOTE — PROGRESS NOTES
Chief Complaint   Patient presents with    Type 2 diabetes mellitus without complication, without long     4 month follow-up       History of Present Illness    The patient presents for a follow-up related to Type 2 Diabetes Mellitus. She checks her blood sugars at home. Her sugars are checked every other day. The average sugars are in the 100-150 range. She denies hypoglycemic symptoms. The patient denies polyuria, polydypsia or polyphagia. The patient takes her medication as prescribed. She is taking insulin. Her injection sites are rotated appropriately. The patient does check her feet daily at home. She denies chest pain, shortness of breath, orthopnea, paroxysmal nocturnal dyspnea, dyspnea on exertion, edema, palpitations or syncope.    The patient presents for a follow-up related to hypertension. The patient reports that she has had no headaches or blurred vision. She states that she is taking her medication as prescribed. She is not having medication side effects.    The patient presents for a follow-up related to hyperlipidemia. She is following a low fat diet. She reports that she is exercising. She is taking atorvastatin. The patient is taking her medication as prescribed. She reports no medication side effects, including muscle cramps, abdominal pain, headaches or weakness.    The patient presents for a follow-up related to depression. She reports currently having depression symptoms. She denies suicidal ideation. Her energy level is good. She denies agorophobia. She sleeps well. She is tearful. She states that her current depression symptoms are worsened. She is currently taking a medication. The current medication regimen consists of paroxetine. The patient denies having medication side effects including nausea, headaches, anxiety, increased depression or fatigue.    Medications      Current Outpatient Medications:     acetaZOLAMIDE (DIAMOX) 500 MG capsule, Take 1 capsule by mouth Daily., Disp: , Rfl:      amLODIPine (NORVASC) 5 MG tablet, Take 1 tablet by mouth Daily., Disp: 90 tablet, Rfl: 1    aspirin 81 MG tablet, Take  by mouth Daily., Disp: , Rfl:     atenolol (TENORMIN) 100 MG tablet, TAKE 1 TABLET DAILY, Disp: 90 tablet, Rfl: 1    atorvastatin (LIPITOR) 20 MG tablet, Take 1 tablet by mouth Every Night., Disp: 90 tablet, Rfl: 1    atropine 1 % ophthalmic solution, Administer 1 drop into the left eye 2 (Two) Times a Day., Disp: , Rfl:     CILOXAN 0.3 % ophthalmic ointment, Administer 1 application to the right eye Daily., Disp: , Rfl:     dorzolamide-timolol (COSOPT) 22.3-6.8 MG/ML ophthalmic solution, Apply  to eye 2 (two) times a day., Disp: , Rfl:     glipizide (GLUCOTROL) 5 MG tablet, Take one tablet by mouth twice daily before food, Disp: 180 tablet, Rfl: 2    hydroCHLOROthiazide 25 MG tablet, Take 1 tablet by mouth Daily., Disp: 90 tablet, Rfl: 1    insulin degludec (Tresiba FlexTouch) 100 UNIT/ML solution pen-injector injection, Inject 50 Units under the skin into the appropriate area as directed Daily. TO REPLACE LEVEMIR, Disp: 45 mL, Rfl: 2    Insulin Pen Needle 32G X 4 MM misc, Use daily with insulin, Disp: 100 each, Rfl: 3    Lancets misc, USE TO TEST ONCE DAILY AND AS NEEDED, Disp: 100 each, Rfl: 3    lisinopril (PRINIVIL,ZESTRIL) 40 MG tablet, Take 1 tablet by mouth Daily., Disp: 90 tablet, Rfl: 1    metFORMIN ER (GLUCOPHAGE-XR) 500 MG 24 hr tablet, Take 2 tablets by mouth 2 (Two) Times a Day., Disp: 360 tablet, Rfl: 1    metroNIDAZOLE (METROGEL) 1 % gel, Apply  topically Daily., Disp: 60 g, Rfl: 3    Multiple Vitamins-Minerals (ICAPS AREDS FORMULA PO), Take  by mouth 2 (two) times a day., Disp: , Rfl:     multivitamin (THERAGRAN) tablet tablet, Take  by mouth Daily., Disp: , Rfl:     nystatin-triamcinolone (MYCOLOG II) 485842-9.1 UNIT/GM-% cream, Apply 1 Application topically to the appropriate area as directed 2 (Two) Times a Day., Disp: , Rfl:     PARoxetine (PAXIL) 20 MG tablet, Take 1  "tablet by mouth Every Morning., Disp: 90 tablet, Rfl: 1    prednisoLONE acetate (PRED FORTE) 1 % ophthalmic suspension, Administer 1 drop into the left eye 4 (Four) Times a Day., Disp: , Rfl:     Rocklatan 0.02-0.005 % solution, , Disp: , Rfl:     SITagliptin (Januvia) 100 MG tablet, Take 1 tablet by mouth Daily., Disp: 90 tablet, Rfl: 2    triamcinolone (KENALOG) 0.1 % lotion, Apply  topically to the appropriate area as directed 3 (Three) Times a Day., Disp: 120 mL, Rfl: 1    True Metrix Blood Glucose Test test strip, Use to test blood glucose once daily and as needed, Disp: 100 each, Rfl: 3    Allergies    No Known Allergies    Problem List    Patient Active Problem List   Diagnosis    Abnormal mammogram    Anemia    Depression    Uncontrolled type 2 diabetes mellitus with hyperglycemia    Mixed hyperlipidemia    Hypertension    Anxiety    Vitamin D deficiency       Medications, Allergies, Problems List and Past History were reviewed and updated.    Physical Examination    /70   Pulse 72   Ht 154.9 cm (61\")   Wt 72.6 kg (160 lb)   LMP  (LMP Unknown) Comment: Last pap 1/2020 by JEREMIE Watson  SpO2 93%   BMI 30.23 kg/m²       HEENT: Head- Normocephalic Atraumatic. Facies- Within normal limits. Pinnas- Normal texture and shape bilaterally. Canals- Normal bilaterally. TMs- Normal bilaterally. Nares- Patent bilaterally. Nasal Septum- is normal. There is no tenderness to palpation over the frontal or maxillary sinuses. Lids- Normal bilaterally. Conjunctiva- Clear bilaterally. Sclera- Anicteric bilaterally. Oropharynx- Moist with no lesions. Tonsils- No enlargement, erythema or exudate.    Neck: Thyroid- non enlarged, symmetric and has no nodules. No bruits are detected.    Lungs: Auscultation- Clear to auscultation bilaterally. There are no retractions, clubbing or cyanosis. The Expiratory to Inspiratory ratio is equal.    Cardiovascular: There are no carotid bruits. Heart- Normal Rate with Regular rhythm " and no murmurs. There are no gallops. There are no rubs. In the lower extremities there is no edema. The upper extremities do not have edema.    Abdomen: Soft, benign, non-tender with no masses, hernias, organomegaly or scars.      Impression and Assessment    Type 2 Diabetes Mellitus without complication with insulin usage.    Essential Hypertension.    Hyperlipidemia.    Reactive Depression.    Plan    Essential Hypertension Plan: The patient was instructed to continue the current medications.    Hyperlipidemia Plan: The patient was instructed to exercise daily, eat a low fat diet and continue her medications.    Type 2 Diabetes Mellitus without complication with insulin usage Plan: The patient was instructed to continue the current medications.    Reactive Depression Plan: The patient was instructed to continue the current medications. Medication will be added as noted.    Diagnoses and all orders for this visit:    1. Type 2 diabetes mellitus without complication, with long-term current use of insulin (Primary)  -     Comprehensive Metabolic Panel; Future  -     Hemoglobin A1c; Future  -     POC Microalbumin; Future  -     Comprehensive Metabolic Panel  -     Hemoglobin A1c    2. Hyperlipidemia, unspecified hyperlipidemia type  -     Comprehensive Metabolic Panel; Future  -     Lipid Panel; Future  -     Comprehensive Metabolic Panel  -     Cancel: Lipid Panel    3. Essential hypertension  -     CBC & Differential; Future  -     Comprehensive Metabolic Panel; Future  -     TSH; Future  -     POC Urinalysis Dipstick, Automated; Future  -     lisinopril (PRINIVIL,ZESTRIL) 40 MG tablet; Take 1 tablet by mouth Daily.  Dispense: 90 tablet; Refill: 1  -     CBC & Differential  -     Comprehensive Metabolic Panel  -     TSH    4. Reactive depression  -     buPROPion XL (Wellbutrin XL) 150 MG 24 hr tablet; Take 1 tablet by mouth Daily.  Dispense: 90 tablet; Refill: 1  -     PARoxetine (PAXIL) 20 MG tablet; Take 1 tablet  by mouth Every Morning.  Dispense: 90 tablet; Refill: 1    5. Need for immunization against influenza  -     Fluzone High-Dose 65+yrs (0365-6522)      BMI is >= 30 and <35. (Class 1 Obesity). The following options were offered after discussion;: weight loss educational material (shared in after visit summary), exercise counseling/recommendations, nutrition counseling/recommendations, and Information on healthy weight added to patient's after visit summary.        Return to Office    The patient was instructed to return for follow-up in 4 months. The patient was instructed to return sooner if the condition changes, worsens, or does not resolve.

## 2024-10-09 LAB
BASOPHILS # BLD AUTO: 0.02 10*3/MM3 (ref 0–0.2)
BASOPHILS NFR BLD AUTO: 0.6 % (ref 0–1.5)
CHOLEST SERPL-MCNC: 123 MG/DL (ref 0–200)
DEPRECATED RDW RBC AUTO: 42.3 FL (ref 37–54)
EOSINOPHIL # BLD AUTO: 0.13 10*3/MM3 (ref 0–0.4)
EOSINOPHIL NFR BLD AUTO: 3.8 % (ref 0.3–6.2)
ERYTHROCYTE [DISTWIDTH] IN BLOOD BY AUTOMATED COUNT: 14.5 % (ref 12.3–15.4)
HBA1C MFR BLD: 7.4 % (ref 4.8–5.6)
HCT VFR BLD AUTO: 40.6 % (ref 34–46.6)
HDLC SERPL-MCNC: 40 MG/DL (ref 40–60)
HGB BLD-MCNC: 12.5 G/DL (ref 12–15.9)
IMM GRANULOCYTES # BLD AUTO: 0.01 10*3/MM3 (ref 0–0.05)
IMM GRANULOCYTES NFR BLD AUTO: 0.3 % (ref 0–0.5)
LDLC SERPL CALC-MCNC: 56 MG/DL (ref 0–100)
LDLC/HDLC SERPL: 1.29 {RATIO}
LYMPHOCYTES # BLD AUTO: 1.05 10*3/MM3 (ref 0.7–3.1)
LYMPHOCYTES NFR BLD AUTO: 30.9 % (ref 19.6–45.3)
MCH RBC QN AUTO: 25.1 PG (ref 26.6–33)
MCHC RBC AUTO-ENTMCNC: 30.8 G/DL (ref 31.5–35.7)
MCV RBC AUTO: 81.5 FL (ref 79–97)
MONOCYTES # BLD AUTO: 0.4 10*3/MM3 (ref 0.1–0.9)
MONOCYTES NFR BLD AUTO: 11.8 % (ref 5–12)
NEUTROPHILS NFR BLD AUTO: 1.79 10*3/MM3 (ref 1.7–7)
NEUTROPHILS NFR BLD AUTO: 52.6 % (ref 42.7–76)
NRBC BLD AUTO-RTO: 0 /100 WBC (ref 0–0.2)
PLATELET # BLD AUTO: 108 10*3/MM3 (ref 140–450)
PMV BLD AUTO: 11.1 FL (ref 6–12)
RBC # BLD AUTO: 4.98 10*6/MM3 (ref 3.77–5.28)
TRIGL SERPL-MCNC: 157 MG/DL (ref 0–150)
VLDLC SERPL-MCNC: 27 MG/DL (ref 5–40)
WBC NRBC COR # BLD AUTO: 3.4 10*3/MM3 (ref 3.4–10.8)

## 2024-11-18 RX ORDER — INSULIN DEGLUDEC 100 U/ML
INJECTION, SOLUTION SUBCUTANEOUS
Qty: 15 ML | Refills: 2 | Status: SHIPPED | OUTPATIENT
Start: 2024-11-18

## 2024-11-18 NOTE — TELEPHONE ENCOUNTER
Rx Refill Note  Requested Prescriptions     Pending Prescriptions Disp Refills    insulin degludec (Tresiba FlexTouch) 100 UNIT/ML solution pen-injector injection [Pharmacy Med Name: TRESIBA FLEXTOUCH 100UNIT SOLN PEN-INJ] 15 mL 2     Sig: INJECT 50 UNITS UNDER SKIN AS DIRECTED DAILY          Last office visit with prescribing clinician: 09/10/2024    Next office visit with prescribing clinician: 01/20/2025  }    Ben Dutta MA  11/18/24, 11:48 EST

## 2024-12-02 RX ORDER — ATENOLOL 100 MG/1
100 TABLET ORAL DAILY
Qty: 90 TABLET | Refills: 1 | Status: SHIPPED | OUTPATIENT
Start: 2024-12-02

## 2024-12-02 NOTE — TELEPHONE ENCOUNTER
Caller: Zeinab Rodriguez    Relationship: Self    Best call back number: [phone]     Requested Prescriptions:   Requested Prescriptions     Pending Prescriptions Disp Refills    atenolol (TENORMIN) 100 MG tablet 90 tablet 1     Sig: Take 1 tablet by mouth Daily.        Pharmacy where request should be sent: [address] [phone] [facility] [phone]     Last office visit with prescribing clinician: 10/8/2024   Last telemedicine visit with prescribing clinician: Visit date not found   Next office visit with prescribing clinician: 2/12/2025     Additional details provided by patient:     Does the patient have less than a 3 day supply:  [] Yes  [x] No    Would you like a call back once the refill request has been completed: [] Yes [x] No    If the office needs to give you a call back, can they leave a voicemail: [] Yes [x] No    Veronica German Rep   12/02/24 16:11 EST

## 2024-12-18 RX ORDER — INSULIN DEGLUDEC 100 U/ML
INJECTION, SOLUTION SUBCUTANEOUS
Qty: 15 ML | Refills: 1 | Status: SHIPPED | OUTPATIENT
Start: 2024-12-18

## 2024-12-18 NOTE — TELEPHONE ENCOUNTER
Rx Refill Note  Requested Prescriptions     Pending Prescriptions Disp Refills    insulin degludec (Tresiba FlexTouch) 100 UNIT/ML solution pen-injector injection 15 mL 2      Last office visit with prescribing clinician: 9/10/2024     Next office visit with prescribing clinician: 1/20/2025

## 2024-12-18 NOTE — TELEPHONE ENCOUNTER
Caller: Zeinab Rodriguez ZUNILDA    Relationship: Self    Best call back number: [phone]    Requested Prescriptions:   Requested Prescriptions     Pending Prescriptions Disp Refills    insulin degludec (Tresiba FlexTouch) 100 UNIT/ML solution pen-injector injection 15 mL 2        Pharmacy where request should be sent: [facility] - [address] [phone] [facility] [phone]     Last office visit with prescribing clinician: 9/10/2024   Last telemedicine visit with prescribing clinician: Visit date not found   Next office visit with prescribing clinician: 1/20/2025     Additional details provided by patient: 1 PEN LEFT     Does the patient have less than a 3 day supply:  [x] Yes  [] No    Would you like a call back once the refill request has been completed: [] Yes [] No    If the office needs to give you a call back, can they leave a voicemail: [] Yes [] No    Veronica Coppola Rep   12/18/24 15:30 EST

## 2024-12-19 DIAGNOSIS — E11.65 UNCONTROLLED TYPE 2 DIABETES MELLITUS WITH HYPERGLYCEMIA: ICD-10-CM

## 2024-12-19 RX ORDER — METFORMIN HYDROCHLORIDE 500 MG/1
1000 TABLET, EXTENDED RELEASE ORAL 2 TIMES DAILY
Qty: 360 TABLET | Refills: 0 | Status: SHIPPED | OUTPATIENT
Start: 2024-12-19

## 2024-12-19 NOTE — TELEPHONE ENCOUNTER
Caller: OG HILLS    Relationship: SELF    Best call back number: [phone]    Requested Prescriptions:   Requested Prescriptions     Pending Prescriptions Disp Refills    metFORMIN ER (GLUCOPHAGE-XR) 500 MG 24 hr tablet 360 tablet 1     Sig: Take 2 tablets by mouth 2 (Two) Times a Day.        Pharmacy where request should be sent: [address] [phone] [facility] [phone]      Last office visit with prescribing clinician: 9/10/2024   Last telemedicine visit with prescribing clinician: Visit date not found   Next office visit with prescribing clinician: 1/20/2025     Additional details provided by patient:     Does the patient have less than a 3 day supply:  [] Yes  [] No    Would you like a call back once the refill request has been completed: [] Yes [] No    If the office needs to give you a call back, can they leave a voicemail: [] Yes [] No    Veronica Rai Rep   12/19/24 14:12 EST

## 2024-12-19 NOTE — TELEPHONE ENCOUNTER
Rx Refill Note  Requested Prescriptions     Pending Prescriptions Disp Refills    metFORMIN ER (GLUCOPHAGE-XR) 500 MG 24 hr tablet 360 tablet 1     Sig: Take 2 tablets by mouth 2 (Two) Times a Day.      Last office visit with prescribing clinician: 9/10/2024     Next office visit with prescribing clinician: 1/20/2025     Deepika Patel MA  12/19/24, 15:13 EST

## 2025-02-12 ENCOUNTER — OFFICE VISIT (OUTPATIENT)
Dept: INTERNAL MEDICINE | Facility: CLINIC | Age: 70
End: 2025-02-12
Payer: MEDICARE

## 2025-02-12 VITALS
TEMPERATURE: 97.8 F | DIASTOLIC BLOOD PRESSURE: 66 MMHG | WEIGHT: 159 LBS | RESPIRATION RATE: 18 BRPM | BODY MASS INDEX: 30.04 KG/M2 | HEART RATE: 72 BPM | SYSTOLIC BLOOD PRESSURE: 132 MMHG

## 2025-02-12 DIAGNOSIS — Z79.4 TYPE 2 DIABETES MELLITUS WITHOUT COMPLICATION, WITH LONG-TERM CURRENT USE OF INSULIN: Primary | ICD-10-CM

## 2025-02-12 DIAGNOSIS — E78.5 HYPERLIPIDEMIA, UNSPECIFIED HYPERLIPIDEMIA TYPE: ICD-10-CM

## 2025-02-12 DIAGNOSIS — F32.9 REACTIVE DEPRESSION: ICD-10-CM

## 2025-02-12 DIAGNOSIS — D64.9 ANEMIA, UNSPECIFIED TYPE: ICD-10-CM

## 2025-02-12 DIAGNOSIS — I10 ESSENTIAL HYPERTENSION: ICD-10-CM

## 2025-02-12 DIAGNOSIS — E11.9 TYPE 2 DIABETES MELLITUS WITHOUT COMPLICATION, WITH LONG-TERM CURRENT USE OF INSULIN: Primary | ICD-10-CM

## 2025-02-12 LAB
ALBUMIN SERPL-MCNC: 4 G/DL (ref 3.5–5.2)
ALBUMIN/CREATININE RATIO, URINE: NORMAL
ALBUMIN/GLOB SERPL: 1.5 G/DL
ALP SERPL-CCNC: 76 U/L (ref 39–117)
ALT SERPL W P-5'-P-CCNC: 26 U/L (ref 1–33)
ANION GAP SERPL CALCULATED.3IONS-SCNC: 12 MMOL/L (ref 5–15)
AST SERPL-CCNC: 30 U/L (ref 1–32)
BILIRUB BLD-MCNC: NEGATIVE MG/DL
BILIRUB SERPL-MCNC: 0.4 MG/DL (ref 0–1.2)
BUN SERPL-MCNC: 10 MG/DL (ref 8–23)
BUN/CREAT SERPL: 9.8 (ref 7–25)
CALCIUM SPEC-SCNC: 9.4 MG/DL (ref 8.6–10.5)
CHLORIDE SERPL-SCNC: 101 MMOL/L (ref 98–107)
CHOLEST SERPL-MCNC: 115 MG/DL (ref 0–200)
CLARITY, POC: CLEAR
CO2 SERPL-SCNC: 27 MMOL/L (ref 22–29)
COLOR UR: YELLOW
CREAT SERPL-MCNC: 1.02 MG/DL (ref 0.57–1)
EGFRCR SERPLBLD CKD-EPI 2021: 59.3 ML/MIN/1.73
EXPIRATION DATE: NORMAL
EXPIRATION DATE: NORMAL
FERRITIN SERPL-MCNC: 15.7 NG/ML (ref 13–150)
GLOBULIN UR ELPH-MCNC: 2.6 GM/DL
GLUCOSE SERPL-MCNC: 107 MG/DL (ref 65–99)
GLUCOSE UR STRIP-MCNC: NEGATIVE MG/DL
HBA1C MFR BLD: 6.9 % (ref 4.8–5.6)
HDLC SERPL-MCNC: 38 MG/DL (ref 40–60)
IRON 24H UR-MRATE: 55 MCG/DL (ref 37–145)
IRON SATN MFR SERPL: 11 % (ref 20–50)
KETONES UR QL: NEGATIVE
LDLC SERPL CALC-MCNC: 48 MG/DL (ref 0–100)
LDLC/HDLC SERPL: 1.11 {RATIO}
LEUKOCYTE EST, POC: NEGATIVE
Lab: NORMAL
Lab: NORMAL
NITRITE UR-MCNC: NEGATIVE MG/ML
PH UR: 5 [PH] (ref 5–8)
POC CREATININE URINE: NORMAL
POC MICROALBUMIN URINE: NORMAL
POTASSIUM SERPL-SCNC: 4.1 MMOL/L (ref 3.5–5.2)
PROT SERPL-MCNC: 6.6 G/DL (ref 6–8.5)
PROT UR STRIP-MCNC: NEGATIVE MG/DL
RBC # UR STRIP: NEGATIVE /UL
SODIUM SERPL-SCNC: 140 MMOL/L (ref 136–145)
SP GR UR: 1.02 (ref 1–1.03)
TIBC SERPL-MCNC: 510 MCG/DL (ref 298–536)
TRANSFERRIN SERPL-MCNC: 342 MG/DL (ref 200–360)
TRIGL SERPL-MCNC: 175 MG/DL (ref 0–150)
UROBILINOGEN UR QL: NORMAL
VLDLC SERPL-MCNC: 29 MG/DL (ref 5–40)

## 2025-02-12 PROCEDURE — 99214 OFFICE O/P EST MOD 30 MIN: CPT | Performed by: INTERNAL MEDICINE

## 2025-02-12 PROCEDURE — 36415 COLL VENOUS BLD VENIPUNCTURE: CPT | Performed by: INTERNAL MEDICINE

## 2025-02-12 PROCEDURE — 80053 COMPREHEN METABOLIC PANEL: CPT | Performed by: INTERNAL MEDICINE

## 2025-02-12 PROCEDURE — 83036 HEMOGLOBIN GLYCOSYLATED A1C: CPT | Performed by: INTERNAL MEDICINE

## 2025-02-12 PROCEDURE — 83540 ASSAY OF IRON: CPT | Performed by: INTERNAL MEDICINE

## 2025-02-12 PROCEDURE — 81003 URINALYSIS AUTO W/O SCOPE: CPT | Performed by: INTERNAL MEDICINE

## 2025-02-12 PROCEDURE — 3075F SYST BP GE 130 - 139MM HG: CPT | Performed by: INTERNAL MEDICINE

## 2025-02-12 PROCEDURE — 82728 ASSAY OF FERRITIN: CPT | Performed by: INTERNAL MEDICINE

## 2025-02-12 PROCEDURE — 85025 COMPLETE CBC W/AUTO DIFF WBC: CPT | Performed by: INTERNAL MEDICINE

## 2025-02-12 PROCEDURE — 84466 ASSAY OF TRANSFERRIN: CPT | Performed by: INTERNAL MEDICINE

## 2025-02-12 PROCEDURE — 1125F AMNT PAIN NOTED PAIN PRSNT: CPT | Performed by: INTERNAL MEDICINE

## 2025-02-12 PROCEDURE — 80061 LIPID PANEL: CPT | Performed by: INTERNAL MEDICINE

## 2025-02-12 PROCEDURE — 82044 UR ALBUMIN SEMIQUANTITATIVE: CPT | Performed by: INTERNAL MEDICINE

## 2025-02-12 PROCEDURE — 3078F DIAST BP <80 MM HG: CPT | Performed by: INTERNAL MEDICINE

## 2025-02-12 NOTE — PROGRESS NOTES
Chief Complaint   Patient presents with    Follow-up     4 month follow up chronic medical problems       History of Present Illness     The patient presents for a follow-up related to Type 2 Diabetes Mellitus. She checks her blood sugars at home. Her sugars are checked every other day. The average sugars are in the 130-180 range. She denies hypoglycemic symptoms. The patient denies polyuria, polydypsia or polyphagia. The patient takes her medication as prescribed. She is taking insulin and metformin. Denies neuropathy symptoms.8 She denies chest pain, shortness of breath, orthopnea, dyspnea on exertion, edema, palpitations or syncope.    The patient presents for a follow-up related to hyperlipidemia. She is following a low fat diet. She reports that she is exercising. She is taking atorvastatin. The patient is taking her medication as prescribed. She reports no medication side effects, including muscle cramps, abdominal pain, headaches or weakness. She denies orthopnea, paroxysmal nocturnal dyspnea or dyspnea on exertion.    The patient presents for a follow-up related to hypertension. The patient reports that she has had no headaches, chest pain, dyspnea, edema, syncope, blurred vision or palpitations. She states that she is taking her medication as prescribed. She is not having medication side effects.     The patient presents for a depression follow up. She denies having depression symptoms. She denies suicidal ideation. Her energy level is good. She is currently Wellbutrin. She denies having medication side effects including nausea, headaches, anxiety, increased depression or fatigue.     Follow up for anemia. Denies symptoms of fatigue, SOB, pale or discolored skin, dizziness or lightheadedness.     Medications      Current Outpatient Medications:     amLODIPine (NORVASC) 5 MG tablet, Take 1 tablet by mouth Daily., Disp: 90 tablet, Rfl: 1    aspirin 81 MG tablet, Take  by mouth Daily., Disp: , Rfl:      atenolol (TENORMIN) 100 MG tablet, Take 1 tablet by mouth Daily., Disp: 90 tablet, Rfl: 1    atorvastatin (LIPITOR) 20 MG tablet, Take 1 tablet by mouth Every Night., Disp: 90 tablet, Rfl: 1    atropine 1 % ophthalmic solution, Administer 1 drop into the left eye 2 (Two) Times a Day., Disp: , Rfl:     buPROPion XL (Wellbutrin XL) 150 MG 24 hr tablet, Take 1 tablet by mouth Daily., Disp: 90 tablet, Rfl: 1    CILOXAN 0.3 % ophthalmic ointment, Administer 1 application to the right eye Daily., Disp: , Rfl:     dorzolamide-timolol (COSOPT) 22.3-6.8 MG/ML ophthalmic solution, Apply  to eye 2 (two) times a day., Disp: , Rfl:     glipizide (GLUCOTROL) 5 MG tablet, Take one tablet by mouth twice daily before food, Disp: 180 tablet, Rfl: 2    hydroCHLOROthiazide 25 MG tablet, Take 1 tablet by mouth Daily., Disp: 90 tablet, Rfl: 1    insulin degludec (Tresiba FlexTouch) 100 UNIT/ML solution pen-injector injection, INJECT 50 UNITS UNDER SKIN AS DIRECTED DAILY, Disp: 15 mL, Rfl: 1    Insulin Pen Needle 32G X 4 MM misc, Use daily with insulin, Disp: 100 each, Rfl: 3    Lancets misc, USE TO TEST ONCE DAILY AND AS NEEDED, Disp: 100 each, Rfl: 3    lisinopril (PRINIVIL,ZESTRIL) 40 MG tablet, Take 1 tablet by mouth Daily., Disp: 90 tablet, Rfl: 1    metFORMIN ER (GLUCOPHAGE-XR) 500 MG 24 hr tablet, Take 2 tablets by mouth 2 (Two) Times a Day., Disp: 360 tablet, Rfl: 0    metroNIDAZOLE (METROGEL) 1 % gel, Apply  topically Daily., Disp: 60 g, Rfl: 3    Multiple Vitamins-Minerals (ICAPS AREDS FORMULA PO), Take  by mouth 2 (two) times a day., Disp: , Rfl:     multivitamin (THERAGRAN) tablet tablet, Take  by mouth Daily., Disp: , Rfl:     nystatin-triamcinolone (MYCOLOG II) 735894-2.1 UNIT/GM-% cream, Apply 1 Application topically to the appropriate area as directed 2 (Two) Times a Day., Disp: , Rfl:     PARoxetine (PAXIL) 20 MG tablet, Take 1 tablet by mouth Every Morning., Disp: 90 tablet, Rfl: 1    SITagliptin (Januvia) 100 MG tablet,  Take 1 tablet by mouth Daily., Disp: 90 tablet, Rfl: 2    True Metrix Blood Glucose Test test strip, Use to test blood glucose once daily and as needed, Disp: 100 each, Rfl: 3     Allergies    No Known Allergies    Problem List    Patient Active Problem List   Diagnosis    Abnormal mammogram    Anemia    Depression    Uncontrolled type 2 diabetes mellitus with hyperglycemia    Mixed hyperlipidemia    Hypertension    Anxiety    Vitamin D deficiency       Medications, Allergies, Problems List and Past History were reviewed and updated.    Physical Examination    /66   Pulse 72   Temp 97.8 °F (36.6 °C) (Infrared)   Resp 18   Wt 72.1 kg (159 lb)   LMP  (LMP Unknown) Comment: Last pap 1/2020 by JEREMIE Watson  BMI 30.04 kg/m²    Physical Exam  Constitutional:       General: She is not in acute distress.     Appearance: Normal appearance. She is not ill-appearing, toxic-appearing or diaphoretic.   Cardiovascular:      Rate and Rhythm: Normal rate and regular rhythm.      Pulses: Normal pulses.      Heart sounds: Normal heart sounds. No murmur heard.     No friction rub. No gallop.   Pulmonary:      Effort: Pulmonary effort is normal. No respiratory distress.      Breath sounds: Normal breath sounds. No stridor. No wheezing, rhonchi or rales.   Chest:      Chest wall: No tenderness.   Skin:     General: Skin is warm and dry.      Findings: Rash present.      Comments: Rash on upper thighs front and back   Neurological:      Mental Status: She is alert.      Diagnoses and all orders for this visit:    1. Type 2 diabetes mellitus without complication, with long-term current use of insulin (Primary)  -     Comprehensive Metabolic Panel; Future  -     Hemoglobin A1c; Future  -     POC Microalbumin; Future  -     Comprehensive Metabolic Panel  -     Hemoglobin A1c    2. Hyperlipidemia, unspecified hyperlipidemia type  -     Comprehensive Metabolic Panel; Future  -     Lipid Panel; Future  -     Comprehensive  Metabolic Panel  -     Lipid Panel    3. Essential hypertension  -     CBC & Differential; Future  -     Comprehensive Metabolic Panel; Future  -     POC Urinalysis Dipstick, Automated; Future  -     CBC & Differential  -     Comprehensive Metabolic Panel    4. Reactive depression    5. Anemia, unspecified type  -     Ferritin; Future  -     Iron Profile; Future  -     Ferritin  -     Iron Profile    Continue taking medications as Prescribed. Referral and consult to dermatology for rash on upper legs. Follow up in 4 months or if symptoms persist or worsen.     Attending Note:   Patient was personally seen and examined by me.  I have obtained and corroborated the history and examination as documented above, and agree with the accuracy of the documented information.  All orders were reviewed and personally signed by me.   Janes Almazan MD  17:47 EST  02/12/25

## 2025-02-13 LAB
BASOPHILS # BLD AUTO: 0.04 10*3/MM3 (ref 0–0.2)
BASOPHILS NFR BLD AUTO: 0.8 % (ref 0–1.5)
DEPRECATED RDW RBC AUTO: 43.7 FL (ref 37–54)
EOSINOPHIL # BLD AUTO: 0.19 10*3/MM3 (ref 0–0.4)
EOSINOPHIL NFR BLD AUTO: 4 % (ref 0.3–6.2)
ERYTHROCYTE [DISTWIDTH] IN BLOOD BY AUTOMATED COUNT: 14.7 % (ref 12.3–15.4)
HCT VFR BLD AUTO: 40.3 % (ref 34–46.6)
HGB BLD-MCNC: 13.2 G/DL (ref 12–15.9)
IMM GRANULOCYTES # BLD AUTO: 0.02 10*3/MM3 (ref 0–0.05)
IMM GRANULOCYTES NFR BLD AUTO: 0.4 % (ref 0–0.5)
LYMPHOCYTES # BLD AUTO: 1.09 10*3/MM3 (ref 0.7–3.1)
LYMPHOCYTES NFR BLD AUTO: 23 % (ref 19.6–45.3)
MCH RBC QN AUTO: 27 PG (ref 26.6–33)
MCHC RBC AUTO-ENTMCNC: 32.8 G/DL (ref 31.5–35.7)
MCV RBC AUTO: 82.6 FL (ref 79–97)
MONOCYTES # BLD AUTO: 0.54 10*3/MM3 (ref 0.1–0.9)
MONOCYTES NFR BLD AUTO: 11.4 % (ref 5–12)
NEUTROPHILS NFR BLD AUTO: 2.86 10*3/MM3 (ref 1.7–7)
NEUTROPHILS NFR BLD AUTO: 60.4 % (ref 42.7–76)
NRBC BLD AUTO-RTO: 0 /100 WBC (ref 0–0.2)
PLATELET # BLD AUTO: 130 10*3/MM3 (ref 140–450)
PMV BLD AUTO: 11.4 FL (ref 6–12)
RBC # BLD AUTO: 4.88 10*6/MM3 (ref 3.77–5.28)
WBC NRBC COR # BLD AUTO: 4.74 10*3/MM3 (ref 3.4–10.8)

## 2025-02-24 DIAGNOSIS — E11.9 TYPE 2 DIABETES MELLITUS WITHOUT COMPLICATION, WITHOUT LONG-TERM CURRENT USE OF INSULIN: ICD-10-CM

## 2025-02-24 DIAGNOSIS — E78.5 HYPERLIPIDEMIA, UNSPECIFIED HYPERLIPIDEMIA TYPE: ICD-10-CM

## 2025-02-24 RX ORDER — ATORVASTATIN CALCIUM 20 MG/1
20 TABLET, FILM COATED ORAL NIGHTLY
Qty: 90 TABLET | Refills: 1 | Status: SHIPPED | OUTPATIENT
Start: 2025-02-24

## 2025-02-24 RX ORDER — ATENOLOL 100 MG/1
100 TABLET ORAL DAILY
Qty: 90 TABLET | Refills: 1 | Status: SHIPPED | OUTPATIENT
Start: 2025-02-24

## 2025-02-25 ENCOUNTER — TELEPHONE (OUTPATIENT)
Dept: INTERNAL MEDICINE | Facility: CLINIC | Age: 70
End: 2025-02-25

## 2025-02-25 DIAGNOSIS — R21 RASH: ICD-10-CM

## 2025-02-25 DIAGNOSIS — L98.9 SKIN LESION: Primary | ICD-10-CM

## 2025-02-25 NOTE — TELEPHONE ENCOUNTER
Caller: Zeinab Rodriguez    Relationship: Self    Best call back number: [phone]     What was the call regarding: PATIENT HASN'T HEARD FROM ANYONE REGARDING HER DERMATOLOGY REFERRAL.    Is it okay if the provider responds through MyChart: NO

## 2025-02-25 NOTE — TELEPHONE ENCOUNTER
Patient is requesting a Dermatology referral. Patient stated this is for a rash on her upper legs that has been going on for a few months.

## 2025-03-03 ENCOUNTER — TELEPHONE (OUTPATIENT)
Dept: ENDOCRINOLOGY | Facility: CLINIC | Age: 70
End: 2025-03-03

## 2025-03-03 DIAGNOSIS — E11.65 UNCONTROLLED TYPE 2 DIABETES MELLITUS WITH HYPERGLYCEMIA: ICD-10-CM

## 2025-03-03 DIAGNOSIS — I10 ESSENTIAL HYPERTENSION: ICD-10-CM

## 2025-03-03 RX ORDER — LISINOPRIL 40 MG/1
40 TABLET ORAL DAILY
Qty: 90 TABLET | Refills: 1 | Status: SHIPPED | OUTPATIENT
Start: 2025-03-03

## 2025-03-03 RX ORDER — METFORMIN HYDROCHLORIDE 500 MG/1
1000 TABLET, EXTENDED RELEASE ORAL 2 TIMES DAILY
Qty: 360 TABLET | Refills: 0 | Status: SHIPPED | OUTPATIENT
Start: 2025-03-03

## 2025-03-03 NOTE — TELEPHONE ENCOUNTER
Caller: Zeinab Rodriguez    Relationship: Self    Best call back number: [phone]     Requested Prescriptions:   Requested Prescriptions      No prescriptions requested or ordered in this encounter    METFORMIN 90 DAY    Pharmacy where request should be sent:    [facility]    Last office visit with prescribing clinician: 9/10/2024     Does the patient have less than a 3 day supply:  [] Yes  [x] No    Would you like a call back once the refill request has been completed: [] Yes [x] No    If the office needs to give you a call back, can they leave a voicemail: [] Yes [x] No    Veronica Izquierdo Rep   03/03/25 15:26 EST

## 2025-03-03 NOTE — TELEPHONE ENCOUNTER
Caller: Zeinab Rodriguez    Relationship: Self    Best call back number: [phone]     Requested Prescriptions:   Requested Prescriptions     Pending Prescriptions Disp Refills    lisinopril (PRINIVIL,ZESTRIL) 40 MG tablet 90 tablet 1     Sig: Take 1 tablet by mouth Daily.        Pharmacy where request should be sent: [address] [phone] [facility] [phone]      Last office visit with prescribing clinician: 2/12/2025   Last telemedicine visit with prescribing clinician: Visit date not found   Next office visit with prescribing clinician: 6/12/2025     Additional details provided by patient:     Does the patient have less than a 3 day supply:  [x] Yes  [] No    Would you like a call back once the refill request has been completed: [] Yes [x] No    If the office needs to give you a call back, can they leave a voicemail: [] Yes [x] No    Veronica German Rep   03/03/25 15:20 EST

## 2025-03-26 DIAGNOSIS — F32.9 REACTIVE DEPRESSION: ICD-10-CM

## 2025-03-26 RX ORDER — PAROXETINE 20 MG/1
20 TABLET, FILM COATED ORAL EVERY MORNING
Qty: 90 TABLET | Refills: 1 | Status: SHIPPED | OUTPATIENT
Start: 2025-03-26

## 2025-04-11 DIAGNOSIS — I10 ESSENTIAL HYPERTENSION: ICD-10-CM

## 2025-04-11 DIAGNOSIS — F32.9 REACTIVE DEPRESSION: ICD-10-CM

## 2025-04-11 RX ORDER — BUPROPION HYDROCHLORIDE 150 MG/1
150 TABLET ORAL DAILY
Qty: 90 TABLET | Refills: 1 | Status: SHIPPED | OUTPATIENT
Start: 2025-04-11

## 2025-04-11 RX ORDER — LISINOPRIL 40 MG/1
40 TABLET ORAL DAILY
Qty: 90 TABLET | Refills: 1 | Status: SHIPPED | OUTPATIENT
Start: 2025-04-11

## 2025-04-11 NOTE — TELEPHONE ENCOUNTER
Caller: Zeinab Rodriguez    Relationship: Self    Best call back number: [phone]     Requested Prescriptions:   Requested Prescriptions     Pending Prescriptions Disp Refills    lisinopril (PRINIVIL,ZESTRIL) 40 MG tablet 90 tablet 1     Sig: Take 1 tablet by mouth Daily.    buPROPion XL (Wellbutrin XL) 150 MG 24 hr tablet 90 tablet 1     Sig: Take 1 tablet by mouth Daily.        Pharmacy where request should be sent: [address] [phone] [facility] [phone]      Last office visit with prescribing clinician: 2/12/2025   Last telemedicine visit with prescribing clinician: Visit date not found   Next office visit with prescribing clinician: 6/12/2025     Additional details provided by patient:    Does the patient have less than a 3 day supply:  [] Yes  [x] No    Would you like a call back once the refill request has been completed: [] Yes [x] No    If the office needs to give you a call back, can they leave a voicemail: [] Yes [x] No    Cadance Dunaway, RegSched Rep   04/11/25 15:19 EDT

## 2025-04-17 RX ORDER — INSULIN DEGLUDEC 100 U/ML
INJECTION, SOLUTION SUBCUTANEOUS
Qty: 15 ML | Refills: 0 | Status: SHIPPED | OUTPATIENT
Start: 2025-04-17

## 2025-04-18 RX ORDER — INSULIN DEGLUDEC 100 U/ML
INJECTION, SOLUTION SUBCUTANEOUS
Refills: 2 | OUTPATIENT
Start: 2025-04-18

## 2025-04-23 RX ORDER — HYDROCHLOROTHIAZIDE 25 MG/1
25 TABLET ORAL DAILY
Qty: 90 TABLET | Refills: 1 | Status: SHIPPED | OUTPATIENT
Start: 2025-04-23

## 2025-04-23 RX ORDER — AMLODIPINE BESYLATE 5 MG/1
5 TABLET ORAL DAILY
Qty: 90 TABLET | Refills: 1 | Status: SHIPPED | OUTPATIENT
Start: 2025-04-23

## 2025-04-23 NOTE — TELEPHONE ENCOUNTER
Caller: Zeinab Rodriguez    Relationship: Self    Best call back number: [phone]    Requested Prescriptions:   Requested Prescriptions     Pending Prescriptions Disp Refills    amLODIPine (NORVASC) 5 MG tablet 90 tablet 1     Sig: Take 1 tablet by mouth Daily.    hydroCHLOROthiazide 25 MG tablet 90 tablet 1     Sig: Take 1 tablet by mouth Daily.        Pharmacy where request should be sent: [address] [phone] [facility] [phone]      Last office visit with prescribing clinician: 2/12/2025   Last telemedicine visit with prescribing clinician: Visit date not found   Next office visit with prescribing clinician: 6/12/2025     Additional details provided by patient: PATIENT HAS LESS THAN THREE DAYS LEFT OF THE HCTZ SHE WOULD LIKE 90 DAYS SUPPLY     Does the patient have less than a 3 day supply:  [x] Yes  [] No    Would you like a call back once the refill request has been completed: [] Yes [x] No    If the office needs to give you a call back, can they leave a voicemail: [] Yes [x] No    Veronica St Rep   04/23/25 15:47 EDT

## 2025-05-28 RX ORDER — ATENOLOL 100 MG/1
100 TABLET ORAL DAILY
Qty: 90 TABLET | Refills: 1 | Status: SHIPPED | OUTPATIENT
Start: 2025-05-28

## 2025-05-28 NOTE — TELEPHONE ENCOUNTER
Caller: Zeinab Rodriguez    Relationship: Self    Best call back number: [phone]    Requested Prescriptions:   Requested Prescriptions     Pending Prescriptions Disp Refills    atenolol (TENORMIN) 100 MG tablet 90 tablet 1     Sig: Take 1 tablet by mouth Daily.        Pharmacy where request should be sent: [address] [phone] [facility] [phone]      Last office visit with prescribing clinician: 2/12/2025   Last telemedicine visit with prescribing clinician: Visit date not found   Next office visit with prescribing clinician: 6/12/2025     Additional details provided by patient: THE PATIENT HAS LESS THEN THREE DAYS LEFT AND SHE WOULD LIKE A 90 DAYS SUPPLY     Does the patient have less than a 3 day supply:  [x] Yes  [] No    Would you like a call back once the refill request has been completed: [] Yes [x] No    If the office needs to give you a call back, can they leave a voicemail: [] Yes [x] No    Veronica St Rep   05/28/25 16:31 EDT

## 2025-05-29 DIAGNOSIS — Z79.4 TYPE 2 DIABETES MELLITUS WITH HYPERGLYCEMIA, WITH LONG-TERM CURRENT USE OF INSULIN: ICD-10-CM

## 2025-05-29 DIAGNOSIS — E11.65 TYPE 2 DIABETES MELLITUS WITH HYPERGLYCEMIA, WITH LONG-TERM CURRENT USE OF INSULIN: ICD-10-CM

## 2025-05-29 RX ORDER — LANCETS 33 GAUGE
EACH MISCELLANEOUS
Qty: 100 EACH | Refills: 3 | OUTPATIENT
Start: 2025-05-29

## 2025-05-29 NOTE — TELEPHONE ENCOUNTER
Rx Refill Note  Requested Prescriptions     Pending Prescriptions Disp Refills    Insulin Pen Needle (Comfort EZ Pen Needles) 32G X 4 MM misc [Pharmacy Med Name: CLEVER CHOICE COMFORT EZ PEN NEEDLES [id number] MISC] 100 each 3     Sig: USE DAILY WITH INSULIN      Last office visit with prescribing clinician: Visit date not found   Last telemedicine visit with prescribing clinician: Visit date not found   Next office visit with prescribing clinician: Visit date not found       Denied  Patient needs appointment    Jessica Kelly MA  05/29/25, 13:04 EDT

## 2025-06-23 DIAGNOSIS — E11.65 UNCONTROLLED TYPE 2 DIABETES MELLITUS WITH HYPERGLYCEMIA: ICD-10-CM

## 2025-06-23 RX ORDER — METFORMIN HYDROCHLORIDE 500 MG/1
1000 TABLET, EXTENDED RELEASE ORAL 2 TIMES DAILY
Qty: 360 TABLET | Refills: 0 | OUTPATIENT
Start: 2025-06-23

## 2025-06-23 NOTE — TELEPHONE ENCOUNTER
Rx Refill Note  Requested Prescriptions     Pending Prescriptions Disp Refills    metFORMIN ER (GLUCOPHAGE-XR) 500 MG 24 hr tablet [Pharmacy Med Name: METFORMIN HYDROCHLORIDE ER 500MG ER TABLET ER 24HR] 360 tablet 0     Sig: TAKE 2 TABLETS BY MOUTH TWICE A DAY      Last office visit with prescribing clinician: 9/10/2024     Next office visit with prescribing clinician: Visit date not found

## 2025-06-27 RX ORDER — INSULIN DEGLUDEC 100 U/ML
INJECTION, SOLUTION SUBCUTANEOUS
Qty: 15 ML | Refills: 0 | Status: SHIPPED | OUTPATIENT
Start: 2025-06-27 | End: 2025-06-30 | Stop reason: SDUPTHER

## 2025-06-27 NOTE — TELEPHONE ENCOUNTER
Called the pt and told her she appt for refills. She was transferred to the front to be scheduled. Once scheduled request will be sent to provider.    She verbalized understanding.

## 2025-06-27 NOTE — TELEPHONE ENCOUNTER
Caller: Zeinab Rodriguez ZUNILDA    Relationship: Self    Best call back number: [phone]      Requested Prescriptions:  insulin degludec (Tresiba FlexTouch) 100 UNIT/ML solution pen-injector injection  Requested Prescriptions      No prescriptions requested or ordered in this encounter        Pharmacy where request should be sent:  [facility] - [place], [place] - [address] [place] [address] - [phone]  - [phone]   [address] [facility] [address]  Phone: [phone]  Fax: [phone]      Last office visit with prescribing clinician: 9/10/2024   Last telemedicine visit with prescribing clinician: Visit date not found   Next office visit with prescribing clinician: Visit date not found     Additional details provided by patient: PT IS OUT OF MEDICATION    Does the patient have less than a 3 day supply:  [x] Yes  [] No    Would you like a call back once the refill request has been completed: [x] Yes [] No    If the office needs to give you a call back, can they leave a voicemail: [x] Yes [] No    Veronica Alexander Rep   06/27/25 10:39 EDT

## 2025-06-27 NOTE — TELEPHONE ENCOUNTER
Last office visit with prescribing clinician: 9/10/2024       Next office visit with prescribing clinician: 6-30-25

## 2025-06-30 ENCOUNTER — OFFICE VISIT (OUTPATIENT)
Dept: ENDOCRINOLOGY | Facility: CLINIC | Age: 70
End: 2025-06-30
Payer: MEDICARE

## 2025-06-30 VITALS
OXYGEN SATURATION: 96 % | BODY MASS INDEX: 29.79 KG/M2 | HEIGHT: 61 IN | HEART RATE: 66 BPM | SYSTOLIC BLOOD PRESSURE: 124 MMHG | WEIGHT: 157.8 LBS | DIASTOLIC BLOOD PRESSURE: 70 MMHG

## 2025-06-30 DIAGNOSIS — E11.65 TYPE 2 DIABETES MELLITUS WITH HYPERGLYCEMIA, WITH LONG-TERM CURRENT USE OF INSULIN: Primary | ICD-10-CM

## 2025-06-30 DIAGNOSIS — Z79.4 TYPE 2 DIABETES MELLITUS WITH HYPERGLYCEMIA, WITH LONG-TERM CURRENT USE OF INSULIN: Primary | ICD-10-CM

## 2025-06-30 PROBLEM — H40.9 GLAUCOMA: Status: ACTIVE | Noted: 2025-06-30

## 2025-06-30 LAB
EXPIRATION DATE: ABNORMAL
EXPIRATION DATE: ABNORMAL
GLUCOSE BLDC GLUCOMTR-MCNC: 196 MG/DL (ref 70–130)
HBA1C MFR BLD: 6.7 % (ref 4.5–5.7)
Lab: ABNORMAL
Lab: ABNORMAL

## 2025-06-30 RX ORDER — AVOBENZONE, HOMOSALATE, OCTISALATE, OCTOCRYLENE 30; 40; 45; 26 MG/ML; MG/ML; MG/ML; MG/ML
CREAM TOPICAL
Qty: 100 EACH | Refills: 3 | Status: SHIPPED | OUTPATIENT
Start: 2025-06-30

## 2025-06-30 RX ORDER — GLIPIZIDE 5 MG/1
TABLET ORAL
Qty: 180 TABLET | Refills: 2 | Status: SHIPPED | OUTPATIENT
Start: 2025-06-30

## 2025-06-30 RX ORDER — CALCIUM CITRATE/VITAMIN D3 200MG-6.25
TABLET ORAL
Qty: 100 EACH | Refills: 3 | Status: SHIPPED | OUTPATIENT
Start: 2025-06-30

## 2025-06-30 RX ORDER — METFORMIN HYDROCHLORIDE 500 MG/1
1000 TABLET, EXTENDED RELEASE ORAL 2 TIMES DAILY
Qty: 360 TABLET | Refills: 2 | Status: SHIPPED | OUTPATIENT
Start: 2025-06-30

## 2025-06-30 RX ORDER — INSULIN DEGLUDEC 100 U/ML
INJECTION, SOLUTION SUBCUTANEOUS
Qty: 45 ML | Refills: 2 | Status: SHIPPED | OUTPATIENT
Start: 2025-06-30

## 2025-06-30 NOTE — PROGRESS NOTES
Chief Complaint   Patient presents with    Diabetes        HPI  Zeinab Rodriguez is a 69 y.o. female presents today for follow-up evaluation of diabetes. They were last seen for this 9/2024 by another provider in clinic.     Hemoglobin A1C   Date Value Ref Range Status   06/30/2025 6.7 (A) 4.5 - 5.7 % Final     Taking medications as prescribed and tolerating well.  Denies changes in health since last visit.   Continuing to follow-up with ophthalmology for glaucoma.     - BG at home: fasting 120-130s     - Admits vision loss: legally blind from glaucoma    - Denies significant hypoglycemia  - Denies numbness. Denies open sores.   - Denies heartburn/reflux, constipation, diarrhea, abdominal pain.  - Denies increased thirst or urination.   - Denies burning with urination.   - Denies SOB, chest pain.    Diet: Meals: 2-3x/day  Breakfast: light Lunch:soup/sandwich Dinner: protein+ cho + vegetable Drinks: water, diet tea/soda   Exercise: walking     Type 2 Diabetes:     Current regimen includes: Metformin 500 mg ER 2 tablets twice daily, glipizide 5 mg twice daily, Januvia 100 mg, Tresiba 49 units  Has tried: Jardiance (cost; tried through patient assistance but did not qualify)   Compliance with medications is good    DM Health Maintenance:  Ophthalmology: 6/2025; eye drops for glaucoma;   Monofilament / Foot exam: performed today   Urine microalbumin:cr: normal 2/2025   LDL:  48 2/12/2025    The following portions of the patient's history were reviewed and updated as appropriate: allergies, current medications, past family history, past medical history, past social history, past surgical history and problem list.    Past Medical History:   Diagnosis Date    Anxiety     Breast cancer     Depression     Diabetes mellitus     Glaucoma     Hypertension     Myopic degeneration      Past Surgical History:   Procedure Laterality Date    APPENDECTOMY  1975    BREAST LUMPECTOMY Left 2014    CHOLECYSTECTOMY  1975    EYE SURGERY  Bilateral 2006    relieve pressure    EYE SURGERY Left 05/28/2024    SALIVARY GLAND EXCISION Left 2000    TONSILLECTOMY  1986      Family History   Problem Relation Age of Onset    Breast cancer Mother     Cervical cancer Mother     Heart attack Maternal Uncle     Hypertension Maternal Uncle     Hypertension Maternal Grandmother     Stroke Maternal Grandmother     Arthritis Maternal Grandfather       Social History     Socioeconomic History    Marital status:    Tobacco Use    Smoking status: Never     Passive exposure: Past    Smokeless tobacco: Never   Vaping Use    Vaping status: Never Used   Substance and Sexual Activity    Alcohol use: No    Drug use: No    Sexual activity: Yes     Partners: Male      No Known Allergies   Current Outpatient Medications on File Prior to Visit   Medication Sig Dispense Refill    amLODIPine (NORVASC) 5 MG tablet Take 1 tablet by mouth Daily. 90 tablet 1    aspirin 81 MG tablet Take  by mouth Daily.      atenolol (TENORMIN) 100 MG tablet Take 1 tablet by mouth Daily. 90 tablet 1    atorvastatin (LIPITOR) 20 MG tablet TAKE 1 TABLET BY MOUTH EVERY NIGHT. 90 tablet 1    atropine 1 % ophthalmic solution Administer 1 drop into the left eye 2 (Two) Times a Day.      buPROPion XL (Wellbutrin XL) 150 MG 24 hr tablet Take 1 tablet by mouth Daily. 90 tablet 1    CILOXAN 0.3 % ophthalmic ointment Administer 1 application to the right eye Daily.      dorzolamide-timolol (COSOPT) 22.3-6.8 MG/ML ophthalmic solution Apply  to eye 2 (two) times a day.      hydroCHLOROthiazide 25 MG tablet Take 1 tablet by mouth Daily. 90 tablet 1    lisinopril (PRINIVIL,ZESTRIL) 40 MG tablet Take 1 tablet by mouth Daily. 90 tablet 1    metroNIDAZOLE (METROGEL) 1 % gel Apply  topically Daily. 60 g 3    Multiple Vitamins-Minerals (ICAPS AREDS FORMULA PO) Take  by mouth 2 (two) times a day.      multivitamin (THERAGRAN) tablet tablet Take  by mouth Daily.      nystatin-triamcinolone (MYCOLOG II) 123806-8.1  "UNIT/GM-% cream Apply 1 Application topically to the appropriate area as directed 2 (Two) Times a Day.      PARoxetine (PAXIL) 20 MG tablet Take 1 tablet by mouth Every Morning. 90 tablet 1    [DISCONTINUED] glipizide (GLUCOTROL) 5 MG tablet Take one tablet by mouth twice daily before food 180 tablet 2    [DISCONTINUED] insulin degludec (Tresiba FlexTouch) 100 UNIT/ML solution pen-injector injection INJECT 50 UNITS UNDER SKIN AS DIRECTED DAILY... NEEDS TO SCHEDULE APPT FOR ADDITIONAL REFILLS 15 mL 0    [DISCONTINUED] Insulin Pen Needle 32G X 4 MM misc Use daily with insulin 100 each 3    [DISCONTINUED] Lancets misc USE TO TEST ONCE DAILY AND AS NEEDED 100 each 3    [DISCONTINUED] metFORMIN ER (GLUCOPHAGE-XR) 500 MG 24 hr tablet Take 2 tablets by mouth 2 (Two) Times a Day. Appointment needed for additional refills. 360 tablet 0    [DISCONTINUED] SITagliptin (Januvia) 100 MG tablet Take 1 tablet by mouth Daily. 90 tablet 2    [DISCONTINUED] True Metrix Blood Glucose Test test strip Use to test blood glucose once daily and as needed 100 each 3     No current facility-administered medications on file prior to visit.        Review of Systems   Constitutional:  Negative for activity change, appetite change, unexpected weight gain and unexpected weight loss.   Eyes:  Positive for visual disturbance.   Respiratory:  Negative for shortness of breath.    Cardiovascular:  Negative for chest pain.   Gastrointestinal:  Negative for abdominal pain, constipation and diarrhea.   Endocrine: Negative for polydipsia and polyuria.   Skin:  Negative for wound.   Neurological:  Negative for dizziness and numbness.        /70 (BP Location: Right arm, Patient Position: Sitting, Cuff Size: Adult)   Pulse 66   Ht 154.9 cm (61\")   Wt 71.6 kg (157 lb 12.8 oz)   LMP  (LMP Unknown) Comment: Last pap 1/2020 by JEREMIE Watson  SpO2 96%   BMI 29.82 kg/m²      Physical Exam  Constitutional:       Appearance: Normal appearance. She is " obese.   Eyes:      Comments: Bilateral erythema   Cardiovascular:      Rate and Rhythm: Normal rate and regular rhythm.      Pulses:           Dorsalis pedis pulses are 2+ on the right side and 2+ on the left side.        Posterior tibial pulses are 2+ on the right side and 2+ on the left side.   Pulmonary:      Effort: Pulmonary effort is normal.   Musculoskeletal:         General: Normal range of motion.      Right foot: No deformity.      Left foot: No deformity.   Feet:      Right foot:      Protective Sensation: 5 sites tested.  5 sites sensed.      Skin integrity: Skin integrity normal. No ulcer.      Toenail Condition: Right toenails are normal.      Left foot:      Protective Sensation: 5 sites tested.  5 sites sensed.      Skin integrity: Skin integrity normal. No ulcer.      Toenail Condition: Left toenails are normal.      Comments: Diabetic Foot Exam Performed and Monofilament Test Performed    Using cane for ambulation     Skin:     General: Skin is warm and dry.   Neurological:      General: No focal deficit present.      Mental Status: She is alert and oriented to person, place, and time.   Psychiatric:         Mood and Affect: Mood normal.         Behavior: Behavior normal.         LABS AND IMAGING  CMP:  Lab Results   Component Value Date    Glucose 196 (A) 06/30/2025    Glucose, UA Negative 02/12/2025    BUN 10 02/12/2025    BUN/Creatinine Ratio 9.8 02/12/2025    Creatinine 1.02 (H) 02/12/2025    Creatinine, Urine 200 mg/dL 02/12/2025    eGFR 59.3 (L) 02/12/2025    eGFR  Am 100 11/08/2021    eGFR Non  Am 82 11/08/2021    EGFR Result 92.2 02/06/2024    Ketones, UA Negative 02/12/2025    CO2 27.0 02/12/2025    Calcium 9.4 02/12/2025    Albumin 4.0 02/12/2025    Albumin/Creatinine Ratio, Urine <30 mg/g 02/12/2025    AST (SGOT) 30 02/12/2025    ALT (SGPT) 26 02/12/2025     Lipid Panel:  Lab Results   Component Value Date    CHOL 115 02/12/2025    TRIG 175 (H) 02/12/2025    HDL 38 (L)  02/12/2025    VLDL 29 02/12/2025    LDL 48 02/12/2025     HbA1c:  Hemoglobin A1C   Date Value Ref Range Status   06/30/2025 6.7 (A) 4.5 - 5.7 % Final     Glucose:  Lab Results   Component Value Date    POCGLU 196 (A) 06/30/2025     Microalbumin:  Lab Results   Component Value Date    ANUPO 16.6 06/06/2024     TSH:  Lab Results   Component Value Date    TSH 1.390 10/08/2024       Assessment and Plan    Diagnoses and all orders for this visit:    1. Type 2 diabetes mellitus with hyperglycemia, with long-term current use of insulin (Primary)  Assessment & Plan:  Diabetes is controlled with hyperglycemia   A1C is 6.9%    Discussed switching from Januvia to GLP-1 for improved blood sugar control; defers at this time    Rx: continue Metformin 500 mg ER 2 tablets twice daily, glipizide 5 mg twice daily, Januvia 100 mg, Tresiba 49 units    Foot exam today  Microalbumin due 2/2025   Eye exam utd; continue following with ophthalmology for glaucoma   LDL at goal <100  BP at  goal <130/80    Cautioned risk for hypoglycemia; advised glucose supplement as needed.    Discussed complications associated with diabetes.    Encouraged continued effort toward lifestyle management:         Increase lean protein and vegetable intake  Avoid concentrated sugar drinks, such as sodas, and processed carbs including crackers, cookies, cakes  Routine physical activity.     Orders:  -     POC Glycosylated Hemoglobin (Hb A1C)  -     POC Glucose, Blood  -     metFORMIN ER (GLUCOPHAGE-XR) 500 MG 24 hr tablet; Take 2 tablets by mouth 2 (Two) Times a Day. Appointment needed for additional refills.  Dispense: 360 tablet; Refill: 2  -     SITagliptin (Januvia) 100 MG tablet; Take 1 tablet by mouth Daily.  Dispense: 90 tablet; Refill: 2  -     glipizide (GLUCOTROL) 5 MG tablet; Take one tablet by mouth twice daily before food  Dispense: 180 tablet; Refill: 2  -     True Metrix Blood Glucose Test test strip; Use to test blood glucose once daily and  as needed  Dispense: 100 each; Refill: 3  -     Lancets misc; USE TO TEST ONCE DAILY AND AS NEEDED  Dispense: 100 each; Refill: 3  -     Insulin Pen Needle 32G X 4 MM misc; Use daily with insulin  Dispense: 100 each; Refill: 3    Other orders  -     insulin degludec (Tresiba FlexTouch) 100 UNIT/ML solution pen-injector injection; INJECT up to 50 UNITS UNDER SKIN AS DIRECTED DAILY.  Dispense: 45 mL; Refill: 2         Return in about 6 months (around 12/30/2025) for follow-up diabetes. The patient was instructed to contact the clinic with any interval questions or concerns.    Electronically signed by: Elin Ellis PA-C   Endocrinology     Please note that portions of this note were completed with a voice recognition program.

## 2025-06-30 NOTE — ASSESSMENT & PLAN NOTE
Diabetes is controlled with hyperglycemia   A1C is 6.9%    Discussed switching from Januvia to GLP-1 for improved blood sugar control; defers at this time    Rx: continue Metformin 500 mg ER 2 tablets twice daily, glipizide 5 mg twice daily, Januvia 100 mg, Tresiba 49 units    Foot exam today  Microalbumin due 2/2025   Eye exam utd; continue following with ophthalmology for glaucoma   LDL at goal <100  BP at  goal <130/80    Cautioned risk for hypoglycemia; advised glucose supplement as needed.    Discussed complications associated with diabetes.    Encouraged continued effort toward lifestyle management:         Increase lean protein and vegetable intake  Avoid concentrated sugar drinks, such as sodas, and processed carbs including crackers, cookies, cakes  Routine physical activity.

## 2025-07-31 ENCOUNTER — HOSPITAL ENCOUNTER (OUTPATIENT)
Dept: GENERAL RADIOLOGY | Facility: HOSPITAL | Age: 70
Discharge: HOME | End: 2025-07-31
Admitting: INTERNAL MEDICINE
Payer: MEDICARE

## 2025-07-31 ENCOUNTER — OFFICE VISIT (OUTPATIENT)
Dept: INTERNAL MEDICINE | Facility: CLINIC | Age: 70
End: 2025-07-31
Payer: MEDICARE

## 2025-07-31 VITALS
RESPIRATION RATE: 16 BRPM | HEART RATE: 72 BPM | WEIGHT: 159 LBS | DIASTOLIC BLOOD PRESSURE: 76 MMHG | BODY MASS INDEX: 30.04 KG/M2 | TEMPERATURE: 97.8 F | SYSTOLIC BLOOD PRESSURE: 154 MMHG

## 2025-07-31 DIAGNOSIS — E78.5 HYPERLIPIDEMIA, UNSPECIFIED HYPERLIPIDEMIA TYPE: ICD-10-CM

## 2025-07-31 DIAGNOSIS — M25.532 LEFT WRIST PAIN: ICD-10-CM

## 2025-07-31 DIAGNOSIS — E11.9 TYPE 2 DIABETES MELLITUS WITHOUT COMPLICATION, WITHOUT LONG-TERM CURRENT USE OF INSULIN: ICD-10-CM

## 2025-07-31 DIAGNOSIS — I10 ESSENTIAL HYPERTENSION: Primary | ICD-10-CM

## 2025-07-31 DIAGNOSIS — F32.9 REACTIVE DEPRESSION: ICD-10-CM

## 2025-07-31 LAB
ALBUMIN SERPL-MCNC: 3.8 G/DL (ref 3.5–5.2)
ALBUMIN/GLOB SERPL: 1.4 G/DL
ALP SERPL-CCNC: 85 U/L (ref 39–117)
ALT SERPL W P-5'-P-CCNC: 24 U/L (ref 1–33)
ANION GAP SERPL CALCULATED.3IONS-SCNC: 12.4 MMOL/L (ref 5–15)
AST SERPL-CCNC: 27 U/L (ref 1–32)
BASOPHILS # BLD AUTO: 0.03 10*3/MM3 (ref 0–0.2)
BASOPHILS NFR BLD AUTO: 0.8 % (ref 0–1.5)
BILIRUB BLD-MCNC: NEGATIVE MG/DL
BILIRUB SERPL-MCNC: 0.4 MG/DL (ref 0–1.2)
BUN SERPL-MCNC: 9 MG/DL (ref 8–23)
BUN/CREAT SERPL: 13.2 (ref 7–25)
CALCIUM SPEC-SCNC: 9.1 MG/DL (ref 8.6–10.5)
CHLORIDE SERPL-SCNC: 101 MMOL/L (ref 98–107)
CHOLEST SERPL-MCNC: 103 MG/DL (ref 0–200)
CLARITY, POC: CLEAR
CO2 SERPL-SCNC: 28.6 MMOL/L (ref 22–29)
COLOR UR: YELLOW
CREAT SERPL-MCNC: 0.68 MG/DL (ref 0.57–1)
DEPRECATED RDW RBC AUTO: 44.8 FL (ref 37–54)
EGFRCR SERPLBLD CKD-EPI 2021: 93.2 ML/MIN/1.73
EOSINOPHIL # BLD AUTO: 0.16 10*3/MM3 (ref 0–0.4)
EOSINOPHIL NFR BLD AUTO: 4.1 % (ref 0.3–6.2)
ERYTHROCYTE [DISTWIDTH] IN BLOOD BY AUTOMATED COUNT: 14.3 % (ref 12.3–15.4)
EXPIRATION DATE: ABNORMAL
EXPIRATION DATE: NORMAL
GLOBULIN UR ELPH-MCNC: 2.8 GM/DL
GLUCOSE SERPL-MCNC: 247 MG/DL (ref 65–99)
GLUCOSE UR STRIP-MCNC: ABNORMAL MG/DL
HCT VFR BLD AUTO: 41.2 % (ref 34–46.6)
HDLC SERPL-MCNC: 33 MG/DL (ref 40–60)
HGB BLD-MCNC: 12.9 G/DL (ref 12–15.9)
IMM GRANULOCYTES # BLD AUTO: 0.02 10*3/MM3 (ref 0–0.05)
IMM GRANULOCYTES NFR BLD AUTO: 0.5 % (ref 0–0.5)
KETONES UR QL: NEGATIVE
LDLC SERPL CALC-MCNC: 42 MG/DL (ref 0–100)
LDLC/HDLC SERPL: 1.13 {RATIO}
LEUKOCYTE EST, POC: NEGATIVE
LYMPHOCYTES # BLD AUTO: 0.96 10*3/MM3 (ref 0.7–3.1)
LYMPHOCYTES NFR BLD AUTO: 24.4 % (ref 19.6–45.3)
Lab: ABNORMAL
Lab: NORMAL
MCH RBC QN AUTO: 27 PG (ref 26.6–33)
MCHC RBC AUTO-ENTMCNC: 31.3 G/DL (ref 31.5–35.7)
MCV RBC AUTO: 86.2 FL (ref 79–97)
MONOCYTES # BLD AUTO: 0.47 10*3/MM3 (ref 0.1–0.9)
MONOCYTES NFR BLD AUTO: 12 % (ref 5–12)
NEUTROPHILS NFR BLD AUTO: 2.29 10*3/MM3 (ref 1.7–7)
NEUTROPHILS NFR BLD AUTO: 58.2 % (ref 42.7–76)
NITRITE UR-MCNC: NEGATIVE MG/ML
NRBC BLD AUTO-RTO: 0 /100 WBC (ref 0–0.2)
PH UR: 5 [PH] (ref 5–8)
PLATELET # BLD AUTO: 115 10*3/MM3 (ref 140–450)
PMV BLD AUTO: 11.6 FL (ref 6–12)
POC CREATININE URINE: 100
POC MICROALBUMIN URINE: 30
POC URINE ALB/CREA RATIO: <30
POTASSIUM SERPL-SCNC: 3.9 MMOL/L (ref 3.5–5.2)
PROT SERPL-MCNC: 6.6 G/DL (ref 6–8.5)
PROT UR STRIP-MCNC: NEGATIVE MG/DL
RBC # BLD AUTO: 4.78 10*6/MM3 (ref 3.77–5.28)
RBC # UR STRIP: NEGATIVE /UL
SODIUM SERPL-SCNC: 142 MMOL/L (ref 136–145)
SP GR UR: 1.02 (ref 1–1.03)
TRIGL SERPL-MCNC: 164 MG/DL (ref 0–150)
UROBILINOGEN UR QL: NORMAL
VLDLC SERPL-MCNC: 28 MG/DL (ref 5–40)
WBC NRBC COR # BLD AUTO: 3.93 10*3/MM3 (ref 3.4–10.8)

## 2025-07-31 PROCEDURE — 85025 COMPLETE CBC W/AUTO DIFF WBC: CPT | Performed by: INTERNAL MEDICINE

## 2025-07-31 PROCEDURE — 80061 LIPID PANEL: CPT | Performed by: INTERNAL MEDICINE

## 2025-07-31 PROCEDURE — 83036 HEMOGLOBIN GLYCOSYLATED A1C: CPT | Performed by: INTERNAL MEDICINE

## 2025-07-31 PROCEDURE — 73110 X-RAY EXAM OF WRIST: CPT

## 2025-07-31 PROCEDURE — 80053 COMPREHEN METABOLIC PANEL: CPT | Performed by: INTERNAL MEDICINE

## 2025-08-01 LAB — HBA1C MFR BLD: 6.9 % (ref 4.8–5.6)

## 2025-08-03 ENCOUNTER — RESULTS FOLLOW-UP (OUTPATIENT)
Dept: INTERNAL MEDICINE | Facility: CLINIC | Age: 70
End: 2025-08-03
Payer: MEDICARE

## 2025-08-03 DIAGNOSIS — M25.532 LEFT WRIST PAIN: Primary | ICD-10-CM
